# Patient Record
Sex: MALE | Race: WHITE | NOT HISPANIC OR LATINO | Employment: UNEMPLOYED | URBAN - METROPOLITAN AREA
[De-identification: names, ages, dates, MRNs, and addresses within clinical notes are randomized per-mention and may not be internally consistent; named-entity substitution may affect disease eponyms.]

---

## 2017-01-27 ENCOUNTER — OFFICE VISIT (OUTPATIENT)
Dept: FAMILY MEDICINE | Facility: CLINIC | Age: 27
End: 2017-01-27

## 2017-01-27 VITALS
SYSTOLIC BLOOD PRESSURE: 112 MMHG | HEIGHT: 71 IN | BODY MASS INDEX: 28.76 KG/M2 | WEIGHT: 205.4 LBS | HEART RATE: 76 BPM | TEMPERATURE: 98 F | DIASTOLIC BLOOD PRESSURE: 72 MMHG | RESPIRATION RATE: 20 BRPM

## 2017-01-27 DIAGNOSIS — Z00.00 ENCOUNTER FOR ROUTINE ADULT HEALTH EXAMINATION WITHOUT ABNORMAL FINDINGS: Primary | ICD-10-CM

## 2017-01-27 DIAGNOSIS — F90.9 ENCOUNTER FOR MEDICATION MANAGEMENT IN ATTENTION DEFICIT HYPERACTIVITY DISORDER (ADHD): ICD-10-CM

## 2017-01-27 DIAGNOSIS — R10.11 ABDOMINAL PAIN, RIGHT UPPER QUADRANT: ICD-10-CM

## 2017-01-27 DIAGNOSIS — Z79.899 ENCOUNTER FOR MEDICATION MANAGEMENT IN ATTENTION DEFICIT HYPERACTIVITY DISORDER (ADHD): ICD-10-CM

## 2017-01-27 DIAGNOSIS — F90.2 ADHD (ATTENTION DEFICIT HYPERACTIVITY DISORDER), COMBINED TYPE: ICD-10-CM

## 2017-01-27 DIAGNOSIS — J31.0 CHRONIC RHINITIS: ICD-10-CM

## 2017-01-27 PROCEDURE — 80076 HEPATIC FUNCTION PANEL: CPT | Mod: 90 | Performed by: PHYSICIAN ASSISTANT

## 2017-01-27 PROCEDURE — 36415 COLL VENOUS BLD VENIPUNCTURE: CPT | Performed by: PHYSICIAN ASSISTANT

## 2017-01-27 PROCEDURE — 99395 PREV VISIT EST AGE 18-39: CPT | Performed by: PHYSICIAN ASSISTANT

## 2017-01-27 RX ORDER — DEXTROAMPHETAMINE SACCHARATE, AMPHETAMINE ASPARTATE MONOHYDRATE, DEXTROAMPHETAMINE SULFATE AND AMPHETAMINE SULFATE 6.25; 6.25; 6.25; 6.25 MG/1; MG/1; MG/1; MG/1
25 CAPSULE, EXTENDED RELEASE ORAL 2 TIMES DAILY
Qty: 60 CAPSULE | Refills: 0 | Status: SHIPPED | OUTPATIENT
Start: 2017-02-01 | End: 2018-07-10

## 2017-01-27 RX ORDER — MONTELUKAST SODIUM 10 MG/1
10 TABLET ORAL AT BEDTIME
Qty: 30 TABLET | Refills: 0 | Status: SHIPPED | OUTPATIENT
Start: 2017-01-27 | End: 2018-07-10

## 2017-01-27 RX ORDER — DEXTROAMPHETAMINE SACCHARATE, AMPHETAMINE ASPARTATE MONOHYDRATE, DEXTROAMPHETAMINE SULFATE AND AMPHETAMINE SULFATE 6.25; 6.25; 6.25; 6.25 MG/1; MG/1; MG/1; MG/1
25 CAPSULE, EXTENDED RELEASE ORAL 2 TIMES DAILY
Qty: 60 CAPSULE | Refills: 0 | Status: SHIPPED | OUTPATIENT
Start: 2017-03-01 | End: 2018-07-10

## 2017-01-27 RX ORDER — DEXTROAMPHETAMINE SACCHARATE, AMPHETAMINE ASPARTATE MONOHYDRATE, DEXTROAMPHETAMINE SULFATE AND AMPHETAMINE SULFATE 6.25; 6.25; 6.25; 6.25 MG/1; MG/1; MG/1; MG/1
25 CAPSULE, EXTENDED RELEASE ORAL 2 TIMES DAILY
Qty: 60 CAPSULE | Refills: 0 | Status: SHIPPED | OUTPATIENT
Start: 2017-04-01 | End: 2017-05-01

## 2017-01-27 NOTE — NURSING NOTE
Allan Leon is here for a CPX.    Pre-visit planning  Immunizations -up to date  Colonoscopy -na  Mammogram -  Asthma test --  PHQ9 -  MARYA 7 -    Questioned patient about current smoking habits.  Pt. has never smoked.  Body mass index is 28.66 kg/(m^2).  BP Cuff left  Arm  M  Cuff  PULSE regular  My Chart: declines  CLASSIFICATION OF OVERWEIGHT AND OBESITY BY BMI                        Obesity Class           BMI(kg/m2)  Underweight                                    < 18.5  Normal                                         18.5-24.9  Overweight                                     25.0-29.9  OBESITY                     I                  30.0-34.9                             II                 35.0-39.9  EXTREME OBESITY             III                >40                            Patient's  BMI Body mass index is 28.66 kg/(m^2).  Http://hin.nhlbi.nih.gov/menuplanner/menu.cgi  ETOH screening:  Questions:  1-How often do you have a drink containing alcohol?                             1-2 times per week(s)  2-How many drinks containing alcohol do you have on a typical day when you are         Drinking?                              2 - 4   3- How often do you have 5 or more drinks on one occasion?                              never per     Have you ever:  @None of the patient's responses to the CAGE screening were positive / Negative CAGE score@

## 2017-01-27 NOTE — PROGRESS NOTES
Allan Leon is a 26 year old male presents for routine health maintenance.    Current concerns:   Allergies. Allan had allergy tests done at age 21 roughly. Confirmed allergies to many different things. Has had runny nose for 2 years, and congestion.   Zrytec 1 tablet daily as well as Flonase. Wondering if there is anything else he should try.    Body mass index is 28.66 kg/(m^2).    Present exercise habits:  3-5 times/week  Present dietary habits:  eats regular meals and follows a balanced nutrition diet    Vit D intake: is not taking supplement    Is the patient a smoker? No  If yes, smoking cessation advised and counseling provided.     Cardiovascular risk factors: none    Over the past few weeks, have you felt down or depressed? Little interest or pleasure in doing things? Yes    Last dental appointment:  last year  Last optical appointment:   more than 3 years ago    Was the patient born between [birth year] and has not had Hep C testing?  No, not applicable    I have reviewed the following histories: Past Medical History, Past Surgical History, Social History, Family History, Problem List, Medication List and Allergies    Past Medical History   Diagnosis Date     Asthma 1/1/2010     Family History   Problem Relation Age of Onset     DIABETES No family hx of      Hypertension No family hx of      Hyperlipidemia No family hx of      Social History     Social History     Marital Status: Single     Spouse Name: N/A     Number of Children: N/A     Years of Education: N/A     Occupational History     [employer]      Social History Main Topics     Smoking status: Never Smoker      Smokeless tobacco: Current User     Types: Chew     Alcohol Use: 0.0 - 2.4 oz/week     0-4 Standard drinks or equivalent per week      Comment: 0-4/day on weekdays and 4/day on wknds     Drug Use: No      Comment: Marijuana a couple times a month     Sexual Activity: Not on file     Other Topics Concern     Not on file     Social  "History Narrative     Patient Active Problem List   Diagnosis     ADHD (attention deficit hyperactivity disorder), combined type     History anabolic steroid use     Health Care Home     ACP (advance care planning)     Acne vulgaris     Other eczema     Mild intermittent asthma without complication     Current Outpatient Prescriptions   Medication     amphetamine-dextroamphetamine (ADDERALL XR) 25 MG 24 hr capsule     No current facility-administered medications for this visit.       Allergies:    Allergies   Allergen Reactions     Nickel      Tape [Adhesive Tape]          ROS:  E/M: NEGATIVE for ear, nose, mouth and throat problems  R: NEGATIVE for significant/chronic cough or SOB  CV: NEGATIVE for chest pain or palpitations  GI: NEGATIVE for abdominal pain, chronic diarrhea or constipation  : NEGATIVE for dysuria, hematuria, weakened urinary stream      OBJECTIVE:    Filed Vitals:    01/27/17 1247   BP: 112/72   Pulse: 76   Temp: 98  F (36.7  C)   TempSrc: Oral   Resp: 20   Height: 1.803 m (5' 11\")   Weight: 93.169 kg (205 lb 6.4 oz)       General: 26 year old male who appears her stated age. Vital signs noted  Head: Normocephalic  Eyes: pupils equal round reactive to light and accomodation, extra ocular movements intact  Ears: external canals and tms free of abnormalities  Nose: patent, without mucosal abnormalities  Mouth and throat: without erythema or lesions of the mucosa  Neck: supple, without adenopathy or thyromegaly  Lungs: clear to auscultation, no wheezing or crackles  Cv: regular rate and rhythm, normal s1 and s2 without murmur or click  Abd: soft, non-tender, no masses, no hepatomegaly or splenomegaly.  Gu: normal external genitalia, no hernia  Ms: normal muscle tone & symmetry  Skin: Clear to inspection  Neuro: sensation and motor function grossly intact; cranial nerves without obvious abnormalities.        ASSESSMENT/PLAN:    1. Encounter for routine adult health examination without abnormal " "findings    2. Encounter for medication management in attention deficit hyperactivity disorder (ADHD)    3. ADHD (attention deficit hyperactivity disorder), combined type  - amphetamine-dextroamphetamine (ADDERALL XR) 25 MG 24 hr capsule; Take 1 capsule (25 mg) by mouth 2 times daily  Dispense: 60 capsule; Refill: 0  - amphetamine-dextroamphetamine (ADDERALL XR) 25 MG 24 hr capsule; Take 1 capsule (25 mg) by mouth 2 times daily  Dispense: 60 capsule; Refill: 0  - amphetamine-dextroamphetamine (ADDERALL XR) 25 MG 24 hr capsule; Take 1 capsule (25 mg) by mouth 2 times daily  Dispense: 60 capsule; Refill: 0  Follow-up visit in 3 months via MyChart or OV.    4. Chronic rhinitis  1 month trial of montelukast. If he would like refills will give without OV.   - montelukast (SINGULAIR) 10 MG tablet; Take 1 tablet (10 mg) by mouth At Bedtime  Dispense: 30 tablet; Refill: 0    5. Abdominal pain, right upper quadrant  Given excess alcohol use as well as upper abdominal pain will check liver panel.   - HEPATIC PANEL ([facility])     reports that he has never smoked. His smokeless tobacco use includes Chew.      Estimated body mass index is 28.66 kg/(m^2) as calculated from the following:    Height as of this encounter: 1.803 m (5' 11\").    Weight as of this encounter: 93.169 kg (205 lb 6.4 oz).        Meds Suggested:      Vitamin D  Tests Recommended:      Regular Dental Examinations        Eye exam  Behavior Modifications:        Safe Sex Practice- Patient declined STD testing today  Immunizations suggested:  Other recommendations:     BMI noted and discussed      Regular testicle exam     Encouraged My Chart    The patient will return to the clinic if symptoms are changing or concern with follow up as discussed. The patient understands and agrees with the plan.      Madyson Rincon PA-C  1/27/2017          Counseling Resources:  ATP IV Guidelines  Pooled Cohorts Equation Calculator  Breast Cancer Risk Calculator  FRAX Risk " Assessment  ICSI Preventive Guidelines  Dietary Guidelines for Americans, 2010  USDA's MyPlate

## 2017-01-27 NOTE — Clinical Note
[facility]    01/27/2017    Patient: Allan Leon  YOB: 1990  Medical Record Number: [id number]                                                                  Controlled Substance Agreement  I understand that my care provider has prescribed controlled substances (narcotics, tranquilizers, and/or stimulants) to help manage my condition(s).  I am taking this medicine to help me function or work.  I know that this is strong medicine.  It could have serious side effects and even cause a dependency on the drug.  If I stop these medicines suddenly, I could have severe withdrawal symptoms.    The risks, benefits, and side effects of these medication(s) were explained to me.  I agree that:  1. I will take part in other treatments as advised by my provider.  This may be psychiatry or counseling, physical therapy, behavioral therapy, group treatment, or a referral to a pain clinic.  I will reduce or stop my medicine when my provider tells me to do so.   2. I will take my medicines as prescribed.  I will not change the dose or schedule unless my provider tells me to.  There will be no refills if I  run out early.   I may be contacted at any time without warning and asked to complete a drug test or pill count.   3. I will keep all my appointments at the clinic.  If I miss appointments or fail to follow instructions, my provider may stop my medicine.  4. I will not ask other providers to prescribe controlled substances. And I will not accept controlled substances from other people. If I need another prescribed controlled substance for a new reason, I will notify my provider within one business day.  5. If I enroll in the [place] Medical Marijuana program, I will tell my provider.  I will also sign an agreement to share my medical records with my provider.  6. I will use one pharmacy to fill all of my controlled substance prescriptions.  If my prescription is mailed to my pharmacy,  it may take 5 to 7 days for my medicine to be ready.  7. I understand that my provider, clinic care team, and pharmacy can track controlled substance prescriptions from other providers through a central database (prescription monitoring program).  8. I will bring in my list of medications (or my medicine bottles) each time I come to the clinic.  REV- 04/2016                                                                                                                                            Page 1 of 2      [facility]    01/27/2017    Patient: Allan Leon  YOB: 1990  Medical Record Number: [id number]    9. Refills of controlled substances will be made only during office hours.  It is up to me to make sure that I do not run out of my medicines on weekends or holidays.    10. I am responsible for my prescriptions.  If the medicine is lost or stolen, it will not be replaced.   I also agree not to share these medicines with anyone.  11. I agree to not use ANY illegal or recreational drugs.  This includes marijuana, cocaine, bath salts or other drugs.  I agree not to use alcohol unless my provider says I may.  I agree to give urine samples whenever asked.  If I fail to give a urine sample, the provider may stop my medicine.     12. I will tell my nurse or provider right away if I become pregnant or have a new medical problem treated outside of [facility].  13. I understand that this medicine can affect my thinking and judgment.  It may be unsafe for me to drive, use machinery and do dangerous tasks.  I will not do any of these things until I know how the medicine affects me.  If my dose changes, I will wait to see how it affects me.  I will contact my provider if I have concerns about medicine side effects.  I understand that if I do not follow any of the conditions above, my prescriptions or treatment may be stopped.    I agree that my provider, clinic care team,  and pharmacy may work with any city, state or federal law enforcement agency that investigates the misuse, sale, or other diversion of my controlled medicine. I will allow my provider to discuss my care with or share a copy of this agreement with any other treating provider, pharmacy or emergency room where I receive care.  I agree to give up (waive) any right of privacy or confidentiality with respect to these authorizations.   I have read this agreement and have asked questions about anything I did not understand.   ___________________________________    ___________________________  Patient Signature                                                           Date and Time  ___________________________________     ____________________________  Witness                                                                            Date and Time  ___________________________________  Madyson Rincon PA-C  REV-  04/2016                                                                                                                                                                 Page 2 of 2

## 2017-01-28 LAB
ALBUMIN SERPL-MCNC: 4.7 G/DL (ref 3.6–5.1)
ALBUMIN/GLOB SERPL: 2.1 (CALC) (ref 1–2.5)
ALP SERPL-CCNC: 43 U/L (ref 40–115)
ALT SERPL-CCNC: 20 U/L (ref 9–46)
AST SERPL-CCNC: 22 U/L (ref 10–40)
BILIRUB SERPL-MCNC: 0.8 MG/DL (ref 0.2–1.2)
BILIRUBIN, DIRECT: 0.2 MG/DL (ref 0–0.5)
BILIRUBIN,INDIRECT: 0.6 MG/DL (CALC) (ref 0.2–1.2)
GLOBULIN, CALCULATED - QUEST: 2.2 G/DL (CALC) (ref 1.9–3.7)
PROT SERPL-MCNC: 6.9 G/DL (ref 6.1–8.1)

## 2017-03-02 ENCOUNTER — TELEPHONE (OUTPATIENT)
Dept: FAMILY MEDICINE | Facility: CLINIC | Age: 27
End: 2017-03-02

## 2017-03-02 NOTE — TELEPHONE ENCOUNTER
Pt called about a PA for his medication.  We have not received anything from his pharmacy regarding a PA.   Pt is going to call his pharmacy so they can send something to us so we can start one.   Jenni.SAVI Aolnso ([facility])

## 2017-03-02 NOTE — TELEPHONE ENCOUNTER
pt called the clinical support line with the following;    Pt states that he called is pharmacy is going to send another PA form to be filled out today  Jose Eduardo  [phone] (home)     [phone]

## 2017-03-07 NOTE — TELEPHONE ENCOUNTER
Pt called about his PA and how he has been out of his medication for 1 week. PA has been resubmitted again yesterday because it timed out on Cover my meds.   Pt was told by his insurance company that we should be able to push this medication through, however, this is not somehting we are going to be able to do   Cover my Meds states that we will find out within 48 to 72 hours if the medication is covered.     Jenni.SAVI Alonso ([facility])    Pt can be reached at: [phone]

## 2017-03-08 NOTE — TELEPHONE ENCOUNTER
Pt's mom came in upset that the PA has not gone through.  Accidentally looking at an old PMI, I gave his mom information and she gave me the number for his insurance company.   Called the insurance company and talked with them and they stated that the PA should becoming back within the next 24 hours.   That there is nothing we can do about it and that we can not push it up to make it a rush at all.   Called Pt back and explained this to him and that if he wants us to be able to talk with him mom when he sends her here on his behalf then we need him to put her on a new PMI sheet.   Told Pt that I will check cover my meds in the morning and will call him to let him know.   Jenni.SAVI Alonso ([facility])      Pt can be reached at: [phone]

## 2017-03-09 NOTE — TELEPHONE ENCOUNTER
Spoke to Allan to let him know that the PA has not gone through per Jenni PETERSEN.  He does not understand what is taking so long and he has already gone through his finals and apparently failed them all so they are not happy.  I will forward to Jenni to be sure she calls the PA office tomorrow to get information as to why this has not been done yet.  It should not take this long.

## 2017-03-09 NOTE — TELEPHONE ENCOUNTER
Checked to see if the PA for his Adderall has gone through and it has not. I have checked twice today.   First time it was 8:00 a.m and now at 2:10pm.   Tried to call Pt and the cell number was not work.   Called on his home phone and left a message to have him call me back.     Jenni.SAVI Alonso ([facility])    Pt can be reached at: [phone]

## 2017-03-10 NOTE — TELEPHONE ENCOUNTER
PA for the Adderall has come back APPROVED.   Approval is from 3/6/17 to 3/6/18  Pt has been called and informed of the Approval.     SAVI Heaton ([facility])

## 2017-05-01 DIAGNOSIS — F90.2 ADHD (ATTENTION DEFICIT HYPERACTIVITY DISORDER), COMBINED TYPE: Primary | ICD-10-CM

## 2017-05-01 RX ORDER — DEXTROAMPHETAMINE SACCHARATE, AMPHETAMINE ASPARTATE MONOHYDRATE, DEXTROAMPHETAMINE SULFATE AND AMPHETAMINE SULFATE 6.25; 6.25; 6.25; 6.25 MG/1; MG/1; MG/1; MG/1
25 CAPSULE, EXTENDED RELEASE ORAL DAILY
Qty: 30 CAPSULE | Refills: 0 | Status: SHIPPED | OUTPATIENT
Start: 2017-07-02 | End: 2017-05-01

## 2017-05-01 RX ORDER — DEXTROAMPHETAMINE SACCHARATE, AMPHETAMINE ASPARTATE MONOHYDRATE, DEXTROAMPHETAMINE SULFATE AND AMPHETAMINE SULFATE 6.25; 6.25; 6.25; 6.25 MG/1; MG/1; MG/1; MG/1
50 CAPSULE, EXTENDED RELEASE ORAL DAILY
Qty: 60 CAPSULE | Refills: 0 | Status: SHIPPED | OUTPATIENT
Start: 2017-06-01 | End: 2017-05-01

## 2017-05-01 RX ORDER — DEXTROAMPHETAMINE SACCHARATE, AMPHETAMINE ASPARTATE MONOHYDRATE, DEXTROAMPHETAMINE SULFATE AND AMPHETAMINE SULFATE 6.25; 6.25; 6.25; 6.25 MG/1; MG/1; MG/1; MG/1
50 CAPSULE, EXTENDED RELEASE ORAL DAILY
Qty: 60 CAPSULE | Refills: 0 | Status: SHIPPED | OUTPATIENT
Start: 2017-05-01 | End: 2017-05-31

## 2017-05-01 RX ORDER — DEXTROAMPHETAMINE SACCHARATE, AMPHETAMINE ASPARTATE MONOHYDRATE, DEXTROAMPHETAMINE SULFATE AND AMPHETAMINE SULFATE 6.25; 6.25; 6.25; 6.25 MG/1; MG/1; MG/1; MG/1
25 CAPSULE, EXTENDED RELEASE ORAL DAILY
Qty: 30 CAPSULE | Refills: 0 | Status: SHIPPED | OUTPATIENT
Start: 2017-06-01 | End: 2017-05-01

## 2017-05-01 RX ORDER — DEXTROAMPHETAMINE SACCHARATE, AMPHETAMINE ASPARTATE MONOHYDRATE, DEXTROAMPHETAMINE SULFATE AND AMPHETAMINE SULFATE 6.25; 6.25; 6.25; 6.25 MG/1; MG/1; MG/1; MG/1
25 CAPSULE, EXTENDED RELEASE ORAL DAILY
Qty: 30 CAPSULE | Refills: 0 | Status: SHIPPED | OUTPATIENT
Start: 2017-05-01 | End: 2017-05-01

## 2017-05-01 RX ORDER — DEXTROAMPHETAMINE SACCHARATE, AMPHETAMINE ASPARTATE MONOHYDRATE, DEXTROAMPHETAMINE SULFATE AND AMPHETAMINE SULFATE 6.25; 6.25; 6.25; 6.25 MG/1; MG/1; MG/1; MG/1
50 CAPSULE, EXTENDED RELEASE ORAL DAILY
Qty: 60 CAPSULE | Refills: 0 | Status: SHIPPED | OUTPATIENT
Start: 2017-07-02 | End: 2017-08-01

## 2017-05-01 RX ORDER — DEXTROAMPHETAMINE SACCHARATE, AMPHETAMINE ASPARTATE MONOHYDRATE, DEXTROAMPHETAMINE SULFATE AND AMPHETAMINE SULFATE 6.25; 6.25; 6.25; 6.25 MG/1; MG/1; MG/1; MG/1
50 CAPSULE, EXTENDED RELEASE ORAL DAILY
Qty: 60 CAPSULE | Refills: 0 | Status: SHIPPED | OUTPATIENT
Start: 2017-06-01 | End: 2017-09-01

## 2017-05-01 NOTE — TELEPHONE ENCOUNTER
Allan Leon is requesting a refill of:    Pending Prescriptions:                       Disp   Refills    amphetamine-dextroamphetamine (ADDERALL X*30 cap*0            Sig: Take 1 capsule (25 mg) by mouth daily    amphetamine-dextroamphetamine (ADDERALL X*30 cap*0            Sig: Take 1 capsule (25 mg) by mouth daily    amphetamine-dextroamphetamine (ADDERALL X*30 cap*0            Sig: Take 1 capsule (25 mg) by mouth daily    Can call [phone] when ready. Last saw Pt on 1/27/17 for a CPX

## 2017-05-01 NOTE — TELEPHONE ENCOUNTER
Allan Leon is requesting a refill of:    Pending Prescriptions:                       Disp   Refills    amphetamine-dextroamphetamine (ADDERALL X*60 cap*0            Sig: Take 2 capsules (50 mg) by mouth daily    amphetamine-dextroamphetamine (ADDERALL X*60 cap*0            Sig: Take 2 capsules (50 mg) by mouth daily    amphetamine-dextroamphetamine (ADDERALL X*60 cap*0            Sig: Take 2 capsules (50 mg) by mouth daily

## 2017-05-04 ENCOUNTER — TELEPHONE (OUTPATIENT)
Dept: FAMILY MEDICINE | Facility: CLINIC | Age: 27
End: 2017-05-04

## 2017-05-04 NOTE — TELEPHONE ENCOUNTER
Medication was previously approved on PA 3/6/17. Pharmacy can call [phone] of there are any issues.

## 2017-07-27 ENCOUNTER — OFFICE VISIT (OUTPATIENT)
Dept: FAMILY MEDICINE | Facility: CLINIC | Age: 27
End: 2017-07-27

## 2017-07-27 VITALS
HEART RATE: 96 BPM | OXYGEN SATURATION: 99 % | WEIGHT: 195 LBS | DIASTOLIC BLOOD PRESSURE: 66 MMHG | BODY MASS INDEX: 27.2 KG/M2 | SYSTOLIC BLOOD PRESSURE: 102 MMHG | TEMPERATURE: 98.6 F

## 2017-07-27 DIAGNOSIS — F90.2 ADHD (ATTENTION DEFICIT HYPERACTIVITY DISORDER), COMBINED TYPE: ICD-10-CM

## 2017-07-27 DIAGNOSIS — R23.8 ABNORMAL SKIN COLOR: Primary | ICD-10-CM

## 2017-07-27 DIAGNOSIS — J31.0 OTHER CHRONIC RHINITIS: ICD-10-CM

## 2017-07-27 PROCEDURE — 99213 OFFICE O/P EST LOW 20 MIN: CPT | Performed by: PHYSICIAN ASSISTANT

## 2017-07-27 RX ORDER — DEXTROAMPHETAMINE SACCHARATE, AMPHETAMINE ASPARTATE MONOHYDRATE, DEXTROAMPHETAMINE SULFATE AND AMPHETAMINE SULFATE 6.25; 6.25; 6.25; 6.25 MG/1; MG/1; MG/1; MG/1
50 CAPSULE, EXTENDED RELEASE ORAL DAILY
Qty: 60 CAPSULE | Refills: 0 | Status: SHIPPED | OUTPATIENT
Start: 2017-08-27 | End: 2017-09-26

## 2017-07-27 RX ORDER — DEXTROAMPHETAMINE SACCHARATE, AMPHETAMINE ASPARTATE MONOHYDRATE, DEXTROAMPHETAMINE SULFATE AND AMPHETAMINE SULFATE 6.25; 6.25; 6.25; 6.25 MG/1; MG/1; MG/1; MG/1
50 CAPSULE, EXTENDED RELEASE ORAL DAILY
Qty: 60 CAPSULE | Refills: 0 | Status: SHIPPED | OUTPATIENT
Start: 2017-07-27 | End: 2017-08-26

## 2017-07-27 RX ORDER — DEXTROAMPHETAMINE SACCHARATE, AMPHETAMINE ASPARTATE MONOHYDRATE, DEXTROAMPHETAMINE SULFATE AND AMPHETAMINE SULFATE 6.25; 6.25; 6.25; 6.25 MG/1; MG/1; MG/1; MG/1
50 CAPSULE, EXTENDED RELEASE ORAL DAILY
Qty: 60 CAPSULE | Refills: 0 | Status: CANCELLED | OUTPATIENT
Start: 2017-07-27

## 2017-07-27 RX ORDER — DEXTROAMPHETAMINE SACCHARATE, AMPHETAMINE ASPARTATE MONOHYDRATE, DEXTROAMPHETAMINE SULFATE AND AMPHETAMINE SULFATE 6.25; 6.25; 6.25; 6.25 MG/1; MG/1; MG/1; MG/1
50 CAPSULE, EXTENDED RELEASE ORAL DAILY
Qty: 60 CAPSULE | Refills: 0 | Status: SHIPPED | OUTPATIENT
Start: 2017-09-27 | End: 2017-10-19

## 2017-07-27 NOTE — PROGRESS NOTES
ADD-ADHD Follow Up    SUBJECTIVE:  Allan Leon is a 27 year old  male who is being treated for Attention Deficit Disorder.    Are your symptoms controlled?   YES  Are you satisfied with your current medication dosage? YES  Are you taking your medication regularly?YES  Are you experiencing any insomnia? No  Are you experiencing any jitteriness? No  Are you experiencing any loss of appetite or weight loss? No  Are you experiencing any other side effects? No    Wt Readings from Last 5 Encounters:   07/27/17 88.5 kg (195 lb)   01/27/17 93.2 kg (205 lb 6.4 oz)   11/03/16 88 kg (194 lb)   08/05/16 86 kg (189 lb 9.6 oz)   05/11/16 86.6 kg (191 lb)         ROS:    E/M: NEGATIVE for ear, mouth and throat problems  R: NEGATIVE for significant cough or SOB  CV: NEGATIVE for chest pain or palpitations   GI: NEGATIVE for nausea, vomiting, abdominal pain, diarrhea or constipation    Chronic Rhinitis - using Zyrtec    Will have discoloration of nose for the last year intermittently- red  Multiple times a day  No specific activities cause exacerbation  Skin feels cold.            Past Medical History:   Diagnosis Date     Asthma 1/1/2010     Family History   Problem Relation Age of Onset     DIABETES No family hx of      Hypertension No family hx of      Hyperlipidemia No family hx of      Social History     Social History     Marital status: Single     Spouse name: N/A     Number of children: N/A     Years of education: N/A     Occupational History     [employer]      student      [employer], psychology     Social History Main Topics     Smoking status: Never Smoker     Smokeless tobacco: Current User     Types: Chew      Comment: 1 tin/day     Alcohol use 0.0 - 2.4 oz/week     0 - 4 Standard drinks or equivalent per week      Comment: 0-4/day on weekdays and 4/day on wknds     Drug use: No      Comment: Marijuana a couple times a month     Sexual activity: Not on file     Other Topics Concern     Not on file      Social History Narrative     Patient Active Problem List   Diagnosis     ADHD (attention deficit hyperactivity disorder), combined type     History anabolic steroid use     Health Care Home     ACP (advance care planning)     Acne vulgaris     Other eczema     Mild intermittent asthma without complication     Current Outpatient Prescriptions   Medication     amphetamine-dextroamphetamine (ADDERALL XR) 25 MG 24 hr capsule     amphetamine-dextroamphetamine (ADDERALL XR) 25 MG 24 hr capsule     montelukast (SINGULAIR) 10 MG tablet     No current facility-administered medications for this visit.         Allergies:  Allergies   Allergen Reactions     Nickel      Tape [Adhesive Tape]        OBJECTIVE  Vitals:    07/27/17 1500   BP: 102/66   BP Location: Left arm   Patient Position: Chair   Cuff Size: Adult Large   Pulse: 96   Temp: 98.6  F (37  C)   TempSrc: Oral   SpO2: 99%   Weight: 88.5 kg (195 lb)        PHYSICAL EXAMINATION    Patient is a 27 year old male, in no acute distress. Vitals noted   Head: Normocephalic, atraumatic.  Eyes: Conjunctiva clear.  No discharge noted.  Ears: External ears, canals and TMs normal Bilaterally: gray and translucent.   Nose: Normal without discharge.  Mouth / Throat:   Pharynx non-erythematous, no exudates present, tonsils without hypertrophy. Mucous membranes moist.  Neck:  Neck supple, No lymphadenopathy, no thyromegaly.  Cardiac:  Normal rhythm and rate, no murmurs, rubs or gallops.  Lungs: clear to auscultation bilaterally; no wheezes, crackles or rhonchi  Skin: face is clear, no telangectasias    ASSESSMENT/PLAN:      1. ADHD (attention deficit hyperactivity disorder), combined type  - amphetamine-dextroamphetamine (ADDERALL XR) 25 MG 24 hr capsule; Take 2 capsules (50 mg) by mouth daily  Dispense: 60 capsule; Refill: 0  - amphetamine-dextroamphetamine (ADDERALL XR) 25 MG 24 hr capsule; Take 2 capsules (50 mg) by mouth daily  Dispense: 60 capsule; Refill: 0  -  amphetamine-dextroamphetamine (ADDERALL XR) 25 MG 24 hr capsule; Take 2 capsules (50 mg) by mouth daily  Dispense: 60 capsule; Refill: 0    ADHD- control is good  Continue current medication dosing.E    Follow up by My Chart in 3 month(s), patient agrees to come in to office every 6 months. Patient will come in immediately if any new concerns.      2. Abnormal skin color  Send picture via [employer]    3. Other chronic rhinitis  Flonase daily            Dana Chadwick PA-C  7/27/2017

## 2017-07-27 NOTE — MR AVS SNAPSHOT
After Visit Summary   7/27/2017    Allan Leon    MRN: [id number]           Patient Information     Date Of Birth          1990        Visit Information        Provider Department      7/27/2017 2:45 PM Dana Chadwick PA [facility]        Today's Diagnoses     Abnormal skin color    -  1    ADHD (attention deficit hyperactivity disorder), combined type        Other chronic rhinitis           Follow-ups after your visit        Who to contact     If you have questions or need follow up information about today's clinic visit or your schedule please contact [facility] directly at [phone].  Normal or non-critical lab and imaging results will be communicated to you by MyChart, letter or phone within 4 business days after the clinic has received the results. If you do not hear from us within 7 days, please contact the clinic through Gridcohart or phone. If you have a critical or abnormal lab result, we will notify you by phone as soon as possible.  Submit refill requests through boo-box or call your pharmacy and they will forward the refill request to us. Please allow 3 business days for your refill to be completed.          Additional Information About Your Visit        MyChart Information     boo-box gives you secure access to your electronic health record. If you see a primary care provider, you can also send messages to your care team and make appointments. If you have questions, please call your primary care clinic.  If you do not have a primary care provider, please call [phone] and they will assist you.        Care EveryWhere ID     This is your Care EveryWhere ID. This could be used by other organizations to access your [place] medical records  [id number]        Your Vitals Were     Pulse Temperature Pulse Oximetry BMI (Body Mass Index)          96 98.6  F (37  C) (Oral) 99% 27.2 kg/m2         Blood Pressure from Last  3 Encounters:   07/27/17 102/66   01/27/17 112/72   11/03/16 112/72    Weight from Last 3 Encounters:   07/27/17 88.5 kg (195 lb)   01/27/17 93.2 kg (205 lb 6.4 oz)   11/03/16 88 kg (194 lb)              Today, you had the following     No orders found for display         Today's Medication Changes          These changes are accurate as of: 7/27/17  9:13 PM.  If you have any questions, ask your nurse or doctor.               These medicines have changed or have updated prescriptions.        Dose/Directions    * amphetamine-dextroamphetamine 25 MG 24 hr capsule   Commonly known as:  ADDERALL XR   This may have changed:  Another medication with the same name was added. Make sure you understand how and when to take each.   Used for:  ADHD (attention deficit hyperactivity disorder), combined type   Changed by:  Dana Chadwick PA        Dose:  50 mg   Take 2 capsules (50 mg) by mouth daily   Quantity:  60 capsule   Refills:  0       * amphetamine-dextroamphetamine 25 MG 24 hr capsule   Commonly known as:  ADDERALL XR   This may have changed:  Another medication with the same name was added. Make sure you understand how and when to take each.   Used for:  ADHD (attention deficit hyperactivity disorder), combined type   Changed by:  Madyson Rincon PA-C        Dose:  50 mg   Take 2 capsules (50 mg) by mouth daily   Quantity:  60 capsule   Refills:  0       * amphetamine-dextroamphetamine 25 MG 24 hr capsule   Commonly known as:  ADDERALL XR   This may have changed:  You were already taking a medication with the same name, and this prescription was added. Make sure you understand how and when to take each.   Used for:  ADHD (attention deficit hyperactivity disorder), combined type   Changed by:  Dana Chadwick PA        Dose:  50 mg   Take 2 capsules (50 mg) by mouth daily   Quantity:  60 capsule   Refills:  0       * amphetamine-dextroamphetamine 25 MG 24 hr capsule   Commonly known as:   ADDERALL XR   This may have changed:  You were already taking a medication with the same name, and this prescription was added. Make sure you understand how and when to take each.   Used for:  ADHD (attention deficit hyperactivity disorder), combined type   Changed by:  Dana Chadwick PA        Dose:  50 mg   Start taking on:  8/27/2017   Take 2 capsules (50 mg) by mouth daily   Quantity:  60 capsule   Refills:  0       * amphetamine-dextroamphetamine 25 MG 24 hr capsule   Commonly known as:  ADDERALL XR   This may have changed:  You were already taking a medication with the same name, and this prescription was added. Make sure you understand how and when to take each.   Used for:  ADHD (attention deficit hyperactivity disorder), combined type   Changed by:  Dana Chadwick PA        Dose:  50 mg   Start taking on:  9/27/2017   Take 2 capsules (50 mg) by mouth daily   Quantity:  60 capsule   Refills:  0       * Notice:  This list has 5 medication(s) that are the same as other medications prescribed for you. Read the directions carefully, and ask your doctor or other care provider to review them with you.         Where to get your medicines      Some of these will need a paper prescription and others can be bought over the counter.  Ask your nurse if you have questions.     Bring a paper prescription for each of these medications     amphetamine-dextroamphetamine 25 MG 24 hr capsule    amphetamine-dextroamphetamine 25 MG 24 hr capsule    amphetamine-dextroamphetamine 25 MG 24 hr capsule                Primary Care Provider Office Phone # Fax #    TJ Addison [phone]       [facility]  [address] [facility] [address]        Equal Access to Services     [facility]: Hadii poonam manuel hadasho Soomaali, waaxda luqadaha, qaybta kaalmada lacey, rosalinda alcantara . [facility] [phone].    ATENCIÓN: Reza finnegan,  tiene a yousif disposición servicios gratuitos de asistencia lingüística. Rigo wharton [phone].    We comply with applicable federal civil rights laws and [place] laws. We do not discriminate on the basis of race, color, national origin, age, disability sex, sexual orientation or gender identity.            Thank you!     Thank you for choosing [facility]  for your care. Our goal is always to provide you with excellent care. Hearing back from our patients is one way we can continue to improve our services. Please take a few minutes to complete the written survey that you may receive in the mail after your visit with us. Thank you!             Your Updated Medication List - Protect others around you: Learn how to safely use, store and throw away your medicines at www.disposemymeds.org.          This list is accurate as of: 7/27/17  9:13 PM.  Always use your most recent med list.                   Brand Name Dispense Instructions for use Diagnosis    * amphetamine-dextroamphetamine 25 MG 24 hr capsule    ADDERALL XR    60 capsule    Take 2 capsules (50 mg) by mouth daily    ADHD (attention deficit hyperactivity disorder), combined type       * amphetamine-dextroamphetamine 25 MG 24 hr capsule    ADDERALL XR    60 capsule    Take 2 capsules (50 mg) by mouth daily    ADHD (attention deficit hyperactivity disorder), combined type       * amphetamine-dextroamphetamine 25 MG 24 hr capsule    ADDERALL XR    60 capsule    Take 2 capsules (50 mg) by mouth daily    ADHD (attention deficit hyperactivity disorder), combined type       * amphetamine-dextroamphetamine 25 MG 24 hr capsule   Start taking on:  8/27/2017    ADDERALL XR    60 capsule    Take 2 capsules (50 mg) by mouth daily    ADHD (attention deficit hyperactivity disorder), combined type       * amphetamine-dextroamphetamine 25 MG 24 hr capsule   Start taking on:  9/27/2017    ADDERALL XR    60 capsule    Take 2 capsules (50 mg) by mouth daily     ADHD (attention deficit hyperactivity disorder), combined type       montelukast 10 MG tablet    SINGULAIR    30 tablet    Take 1 tablet (10 mg) by mouth At Bedtime    Chronic rhinitis       * Notice:  This list has 5 medication(s) that are the same as other medications prescribed for you. Read the directions carefully, and ask your doctor or other care provider to review them with you.

## 2017-07-27 NOTE — NURSING NOTE
Allan is here for med check    Pre-Visit Screening :  Immunizations : up to date  Colon Screening : SHELLI  Asthma Action Test/Plan : NA  PHQ9/GAD7 :  NA  Pulse - regular  My Chart - declines    CLASSIFICATION OF OVERWEIGHT AND OBESITY BY BMI                         Obesity Class           BMI(kg/m2)  Underweight                                    < 18.5  Normal                                         18.5-24.9  Overweight                                     25.0-29.9  OBESITY                     I                  30.0-34.9                              II                 35.0-39.9  EXTREME OBESITY             III                >40                             Patient's  BMI Body mass index is 27.2 kg/(m^2).  http://hin.nhlbi.nih.gov/menuplanner/menu.cgi  Questioned patient about current smoking habits.  Pt. has never smoked. Chews

## 2017-09-01 ENCOUNTER — OFFICE VISIT (OUTPATIENT)
Dept: FAMILY MEDICINE | Facility: CLINIC | Age: 27
End: 2017-09-01

## 2017-09-01 VITALS
DIASTOLIC BLOOD PRESSURE: 70 MMHG | TEMPERATURE: 103 F | WEIGHT: 196.4 LBS | SYSTOLIC BLOOD PRESSURE: 110 MMHG | HEART RATE: 116 BPM | BODY MASS INDEX: 26.6 KG/M2 | HEIGHT: 72 IN | RESPIRATION RATE: 20 BRPM

## 2017-09-01 DIAGNOSIS — R07.0 THROAT PAIN: Primary | ICD-10-CM

## 2017-09-01 DIAGNOSIS — R50.9 FEVER, UNSPECIFIED: ICD-10-CM

## 2017-09-01 LAB
ERYTHROCYTE [DISTWIDTH] IN BLOOD BY AUTOMATED COUNT: 12.2 %
FLUAV AG UPPER RESP QL IA.RAPID: NORMAL
FLUBV AG UPPER RESP QL IA.RAPID: NORMAL
HCT VFR BLD AUTO: 50.2 % (ref 40–53)
HEMOGLOBIN: 15.9 G/DL (ref 13.3–17.7)
MCH RBC QN AUTO: 30.9 PG (ref 26–33)
MCHC RBC AUTO-ENTMCNC: 31.7 G/DL (ref 31–36)
MCV RBC AUTO: 97.4 FL (ref 78–100)
MONONUCLEOSIS SCREEN: NORMAL
PLATELET COUNT - QUEST: 199 10^9/L (ref 150–375)
RBC # BLD AUTO: 5.15 10*12/L (ref 4.4–5.9)
S PYO AG THROAT QL IA.RAPID: NORMAL
WBC # BLD AUTO: 10.2 10*9/L (ref 4–11)

## 2017-09-01 PROCEDURE — 87880 STREP A ASSAY W/OPTIC: CPT | Performed by: PHYSICIAN ASSISTANT

## 2017-09-01 PROCEDURE — 86318 IA INFECTIOUS AGENT ANTIBODY: CPT | Performed by: PHYSICIAN ASSISTANT

## 2017-09-01 PROCEDURE — 87804 INFLUENZA ASSAY W/OPTIC: CPT | Performed by: PHYSICIAN ASSISTANT

## 2017-09-01 PROCEDURE — 85027 COMPLETE CBC AUTOMATED: CPT | Performed by: PHYSICIAN ASSISTANT

## 2017-09-01 PROCEDURE — 87070 CULTURE OTHR SPECIMN AEROBIC: CPT | Performed by: PHYSICIAN ASSISTANT

## 2017-09-01 PROCEDURE — 99213 OFFICE O/P EST LOW 20 MIN: CPT | Performed by: PHYSICIAN ASSISTANT

## 2017-09-01 PROCEDURE — 36415 COLL VENOUS BLD VENIPUNCTURE: CPT | Performed by: PHYSICIAN ASSISTANT

## 2017-09-01 NOTE — NURSING NOTE
Allan Leon presents with an illness characterized by dry cough, chills, sweats, nasal congestion, rhinorrhea and sore throat. Symptoms began 2 days ago and are unchanged since that time.  Questioned patient about current smoking habits.  Pt. has never smoked.  Body mass index is 33.67 kg/(m^2).  PULSE regular  My Chart: active  Body mass index is 26.82 kg/(m^2).  Pre-visit planning  Immunizations - up to date

## 2017-09-01 NOTE — LETTER
[facility]  [place] [facility] [address]  [place]  [address]    2017        Allan Leon  : 1990              To Whom it May Concern:    Due to illness (including high fever), please excuse Allan from all work responsibilities 2017 through 2017.     He may return to work without restriction on 2017.    Please contact our clinic with any questions or concerns. [phone].                Madyson Rincon PA-C

## 2017-09-01 NOTE — MR AVS SNAPSHOT
"              After Visit Summary   9/1/2017    Allan Leon    MRN: [id number]           Patient Information     Date Of Birth          1990        Visit Information        Provider Department      9/1/2017 1:30 PM Madyson Rincon PA-C [facility]        Today's Diagnoses     Throat pain    -  1    Fever, unspecified           Follow-ups after your visit        Follow-up notes from your care team     Return if symptoms worsen or fail to improve.      Who to contact     If you have questions or need follow up information about today's clinic visit or your schedule please contact [facility] directly at [phone].  Normal or non-critical lab and imaging results will be communicated to you by MyChart, letter or phone within 4 business days after the clinic has received the results. If you do not hear from us within 7 days, please contact the clinic through GonnaBehart or phone. If you have a critical or abnormal lab result, we will notify you by phone as soon as possible.  Submit refill requests through eFolder or call your pharmacy and they will forward the refill request to us. Please allow 3 business days for your refill to be completed.          Additional Information About Your Visit        MyChart Information     eFolder gives you secure access to your electronic health record. If you see a primary care provider, you can also send messages to your care team and make appointments. If you have questions, please call your primary care clinic.  If you do not have a primary care provider, please call [phone] and they will assist you.        Care EveryWhere ID     This is your Care EveryWhere ID. This could be used by other organizations to access your [place] medical records  [id number]        Your Vitals Were     Pulse Temperature Respirations Height BMI (Body Mass Index)       116 103  F (39.4  C) (Oral) 20 1.822 m (5' 11.75\") 26.82 kg/m2     "    Blood Pressure from Last 3 Encounters:   09/01/17 110/70   07/27/17 102/66   01/27/17 112/72    Weight from Last 3 Encounters:   09/01/17 89.1 kg (196 lb 6.4 oz)   07/27/17 88.5 kg (195 lb)   01/27/17 93.2 kg (205 lb 6.4 oz)              We Performed the Following     CL AFF MONO SCREEN (BFP)     HEMOGRAM/PLATELET (BFP)     Influenza A and B (BFP)     RAPID STREP (BFP)     THROAT CULTURE (BFP)     VENOUS COLLECTION        Primary Care Provider Office Phone # Fax #    Dana Chadwick, [phone] [address]  [facility] [address]        Equal Access to Services     ADRIANO BYRNE : Aide vizcarrao Sostuart, waaxda luqadaha, qaybta kaalmada adeegyada, rosalinda alcantara . [facility] [phone].    ATENCIÓN: Si habla español, tiene a yousif disposición servicios gratuitos de asistencia lingüística. Llame al [phone].    We comply with applicable federal civil rights laws and [place] laws. We do not discriminate on the basis of race, color, national origin, age, disability sex, sexual orientation or gender identity.            Thank you!     Thank you for choosing [facility]  for your care. Our goal is always to provide you with excellent care. Hearing back from our patients is one way we can continue to improve our services. Please take a few minutes to complete the written survey that you may receive in the mail after your visit with us. Thank you!             Your Updated Medication List - Protect others around you: Learn how to safely use, store and throw away your medicines at www.disposemymeds.org.          This list is accurate as of: 9/1/17  5:12 PM.  Always use your most recent med list.                   Brand Name Dispense Instructions for use Diagnosis    * amphetamine-dextroamphetamine 25 MG 24 hr capsule    ADDERALL XR    60 capsule    Take 2 capsules (50 mg) by mouth daily    ADHD (attention deficit hyperactivity  disorder), combined type       * amphetamine-dextroamphetamine 25 MG 24 hr capsule   Start taking on:  9/27/2017    ADDERALL XR    60 capsule    Take 2 capsules (50 mg) by mouth daily    ADHD (attention deficit hyperactivity disorder), combined type       montelukast 10 MG tablet    SINGULAIR    30 tablet    Take 1 tablet (10 mg) by mouth At Bedtime    Chronic rhinitis       * Notice:  This list has 2 medication(s) that are the same as other medications prescribed for you. Read the directions carefully, and ask your doctor or other care provider to review them with you.

## 2017-09-01 NOTE — PROGRESS NOTES
"CC: Sore throat    History:  Allan developed sudden throat pain 2 days ago. Has been getting steadily better with the throat pain. Unfortunately, body aches, fever, sweats, chills, fatigue are getting steadily worse. Has been able to stay hydrated. Works at [employer] tonight and tomorrow and wondering if he should go.      PMH, MEDICATIONS, ALLERGIES, SOCIAL AND FAMILY HISTORY in [facility] and reviewed by me personally.      ROS negative other than the symptoms noted above in the HPI.        Examination   /70 (BP Location: Right arm, Patient Position: Chair, Cuff Size: Adult Regular)  Pulse 116  Temp 103  F (39.4  C) (Oral)  Resp 20  Ht 1.822 m (5' 11.75\")  Wt 89.1 kg (196 lb 6.4 oz)  BMI 26.82 kg/m2       Constitutional: Sitting comfortably, in no acute distress. Vital signs noted. High fever.  Eyes: pupils equal round reactive to light and accomodation, extra ocular movements intact  Ears: external canals and TMs free of abnormalities  Nose: patent, without mucosal abnormalities  Mouth and throat: without erythema or lesions of the mucosa  Neck:  no adenopathy, trachea midline and normal to palpation  Cardiovascular:  regular rate and rhythm, no murmurs, clicks, or gallops  Respiratory:  normal respiratory rate and rhythm, lungs clear to auscultation  SKIN: No jaundice/pallor/rash. Feels warm to touch.  Psychiatric: mentation appears normal and affect normal/bright. No lethargy, but appears mildly uncomfortable.        A/P    ICD-10-CM    1. Throat pain R07.0 RAPID STREP (BFP)     THROAT CULTURE (BFP)     CL AFF MONO SCREEN (BFP)   2. Fever, unspecified R50.9 Influenza A and B (BFP)     HEMOGRAM/PLATELET (BFP)     VENOUS COLLECTION     CL AFF MONO SCREEN (BFP)       DISCUSSION: Allan does have a fever of 103 today, while not taking any fever reducers. Strep, influenza, and mono screens are all negative, and CBC is normal as well. Likely a significant viral pharyngitis. Recommended alternating ibuprofen and " Tylenol every 3 hours. Go to ER if fever does not improve, or with worsening symptoms despite this therapy, or if he is no longer able to stay hydrated. Wrote work note to return on Tuesday.     follow up visit: As needed    Madyson Rincon PA-C  [facility]

## 2017-09-03 LAB — THROAT CULTURE: NORMAL

## 2017-10-19 DIAGNOSIS — F90.2 ADHD (ATTENTION DEFICIT HYPERACTIVITY DISORDER), COMBINED TYPE: ICD-10-CM

## 2017-10-19 RX ORDER — DEXTROAMPHETAMINE SACCHARATE, AMPHETAMINE ASPARTATE MONOHYDRATE, DEXTROAMPHETAMINE SULFATE AND AMPHETAMINE SULFATE 6.25; 6.25; 6.25; 6.25 MG/1; MG/1; MG/1; MG/1
50 CAPSULE, EXTENDED RELEASE ORAL EVERY MORNING
Qty: 60 CAPSULE | Refills: 0 | Status: SHIPPED | OUTPATIENT
Start: 2017-12-19 | End: 2018-01-18

## 2017-10-19 RX ORDER — DEXTROAMPHETAMINE SACCHARATE, AMPHETAMINE ASPARTATE MONOHYDRATE, DEXTROAMPHETAMINE SULFATE AND AMPHETAMINE SULFATE 6.25; 6.25; 6.25; 6.25 MG/1; MG/1; MG/1; MG/1
50 CAPSULE, EXTENDED RELEASE ORAL EVERY MORNING
Qty: 60 CAPSULE | Refills: 0 | Status: SHIPPED | OUTPATIENT
Start: 2017-11-19 | End: 2018-01-18

## 2017-10-19 RX ORDER — DEXTROAMPHETAMINE SACCHARATE, AMPHETAMINE ASPARTATE MONOHYDRATE, DEXTROAMPHETAMINE SULFATE AND AMPHETAMINE SULFATE 6.25; 6.25; 6.25; 6.25 MG/1; MG/1; MG/1; MG/1
50 CAPSULE, EXTENDED RELEASE ORAL DAILY
Qty: 60 CAPSULE | Refills: 0 | Status: SHIPPED | OUTPATIENT
Start: 2017-10-19 | End: 2018-04-11

## 2017-10-19 NOTE — TELEPHONE ENCOUNTER
Allan would like a 3 mon supply refill of his Adderall and would like to pick it up today or tomorrow    Pending Prescriptions:                       Disp   Refills    amphetamine-dextroamphetamine (ADDERALL X*60 cap*0            Sig: Take 2 capsules (50 mg) by mouth daily    amphetamine-dextroamphetamine (ADDERALL X*60 cap*0            Sig: Take 2 capsules (50 mg) by mouth every morning    amphetamine-dextroamphetamine (ADDERALL X*60 cap*0            Sig: Take 2 capsules (50 mg) by mouth every morning    Allan's phone #[phone]

## 2017-11-24 ENCOUNTER — OFFICE VISIT (OUTPATIENT)
Dept: FAMILY MEDICINE | Facility: CLINIC | Age: 27
End: 2017-11-24

## 2017-11-24 VITALS
DIASTOLIC BLOOD PRESSURE: 70 MMHG | OXYGEN SATURATION: 98 % | BODY MASS INDEX: 25.9 KG/M2 | WEIGHT: 191.2 LBS | SYSTOLIC BLOOD PRESSURE: 104 MMHG | HEIGHT: 72 IN | TEMPERATURE: 98.4 F | HEART RATE: 84 BPM

## 2017-11-24 DIAGNOSIS — Z23 NEED FOR VACCINATION: ICD-10-CM

## 2017-11-24 DIAGNOSIS — S06.0X0A CONCUSSION WITHOUT LOSS OF CONSCIOUSNESS, INITIAL ENCOUNTER: Primary | ICD-10-CM

## 2017-11-24 PROCEDURE — 99213 OFFICE O/P EST LOW 20 MIN: CPT | Mod: 25 | Performed by: PHYSICIAN ASSISTANT

## 2017-11-24 PROCEDURE — 90686 IIV4 VACC NO PRSV 0.5 ML IM: CPT | Performed by: PHYSICIAN ASSISTANT

## 2017-11-24 PROCEDURE — 90471 IMMUNIZATION ADMIN: CPT | Performed by: PHYSICIAN ASSISTANT

## 2017-11-24 RX ORDER — TRAMADOL HYDROCHLORIDE 50 MG/1
50 TABLET ORAL EVERY 6 HOURS PRN
Qty: 30 TABLET | Refills: 0 | Status: SHIPPED | OUTPATIENT
Start: 2017-11-24 | End: 2018-01-18

## 2017-11-24 NOTE — LETTER
[facility]  [place] [facility] [address]  [place]  [address]    2017        Patient Name: Allan Leon  : 1990                To Whom It May Concern:    Due to illness, please excuse Allan from all work responsibilities 17-12/15/17.     He may return to work without restriction at that time.    If you have any further questions or problems, please contact our office at [phone].            Madyson Rincon PA-C
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
## 2017-11-24 NOTE — PATIENT INSTRUCTIONS
Ice/heat jaw 20 minutes twice daily.  Try to stick to as soft diet as possible gum.  Will try tramadol for headache. Be careful of drowsiness.    For concussion symptoms, would like you to see neurologist.   I will write referral, and give you their card, so you can call next week when they're open to schedule. Let me know when you get scheduled. When I am back in office, I can try to get it moved up sooner.   In the meantime, I would to start brain break (see additional papers).

## 2017-11-24 NOTE — MR AVS SNAPSHOT
After Visit Summary   11/24/2017    Allan Leon    MRN: [id number]           Patient Information     Date Of Birth          1990        Visit Information        Provider Department      11/24/2017 10:00 AM Madyson Rincon PA-C [facility]        Today's Diagnoses     Concussion without loss of consciousness, initial encounter    -  1      Care Instructions    Ice/heat jaw 20 minutes twice daily.  Try to stick to as soft diet as possible gum.  Will try tramadol for headache. Be careful of drowsiness.    For concussion symptoms, would like you to see neurologist.   I will write referral, and give you their card, so you can call next week when they're open to schedule. Let me know when you get scheduled. When I am back in office, I can try to get it moved up sooner.   In the meantime, I would to start brain break (see additional papers).            Follow-ups after your visit        Who to contact     If you have questions or need follow up information about today's clinic visit or your schedule please contact [facility] directly at [phone].  Normal or non-critical lab and imaging results will be communicated to you by Mir[employer] Therapeuticshart, letter or phone within 4 business days after the clinic has received the results. If you do not hear from us within 7 days, please contact the clinic through [email or web address] or phone. If you have a critical or abnormal lab result, we will notify you by phone as soon as possible.  Submit refill requests through Roadhop or call your pharmacy and they will forward the refill request to us. Please allow 3 business days for your refill to be completed.          Additional Information About Your Visit        [employer] Information     Roadhop gives you secure access to your electronic health record. If you see a primary care provider, you can also send messages to your care team and make appointments. If you have questions,  "please call your primary care clinic.  If you do not have a primary care provider, please call [phone] and they will assist you.        Care EveryWhere ID     This is your Care EveryWhere ID. This could be used by other organizations to access your [place] medical records  [id number]        Your Vitals Were     Pulse Temperature Height Pulse Oximetry BMI (Body Mass Index)       84 98.4  F (36.9  C) (Oral) 1.816 m (5' 11.5\") 98% 26.3 kg/m2        Blood Pressure from Last 3 Encounters:   11/24/17 104/70   09/01/17 110/70   07/27/17 102/66    Weight from Last 3 Encounters:   11/24/17 86.7 kg (191 lb 3.2 oz)   09/01/17 89.1 kg (196 lb 6.4 oz)   07/27/17 88.5 kg (195 lb)              Today, you had the following     No orders found for display         Today's Medication Changes          These changes are accurate as of: 11/24/17 11:17 AM.  If you have any questions, ask your nurse or doctor.               Start taking these medicines.        Dose/Directions    traMADol 50 MG tablet   Commonly known as:  ULTRAM   Used for:  Concussion without loss of consciousness, initial encounter   Started by:  Madyson Rincon PA-C        Dose:  50 mg   Take 1 tablet (50 mg) by mouth every 6 hours as needed for moderate pain   Quantity:  30 tablet   Refills:  0            Where to get your medicines      Some of these will need a paper prescription and others can be bought over the counter.  Ask your nurse if you have questions.     Bring a paper prescription for each of these medications     traMADol 50 MG tablet                Primary Care Provider Office Phone # Fax #    TJ Addison [phone]       [address] [facility] [address]        Equal Access to Services     ADRIANO BYRNE : Aide Bians, waaxda luqadaha, qaybta kaalmada lacey, rosalinda nina. So [facility] [phone].    ATENCIÓN: Si habla español, tiene a yousif disposición " servicios gratuitos de asistencia lingüística. Rigo wharton [phone].    We comply with applicable federal civil rights laws and [place] laws. We do not discriminate on the basis of race, color, national origin, age, disability, sex, sexual orientation, or gender identity.            Thank you!     Thank you for choosing [facility]  for your care. Our goal is always to provide you with excellent care. Hearing back from our patients is one way we can continue to improve our services. Please take a few minutes to complete the written survey that you may receive in the mail after your visit with us. Thank you!             Your Updated Medication List - Protect others around you: Learn how to safely use, store and throw away your medicines at www.disposemymeds.org.          This list is accurate as of: 11/24/17 11:17 AM.  Always use your most recent med list.                   Brand Name Dispense Instructions for use Diagnosis    * amphetamine-dextroamphetamine 25 MG 24 hr capsule    ADDERALL XR    60 capsule    Take 2 capsules (50 mg) by mouth daily    ADHD (attention deficit hyperactivity disorder), combined type       * amphetamine-dextroamphetamine 25 MG 24 hr capsule    ADDERALL XR    60 capsule    Take 2 capsules (50 mg) by mouth every morning    ADHD (attention deficit hyperactivity disorder), combined type       * amphetamine-dextroamphetamine 25 MG 24 hr capsule   Start taking on:  12/19/2017    ADDERALL XR    60 capsule    Take 2 capsules (50 mg) by mouth every morning    ADHD (attention deficit hyperactivity disorder), combined type       montelukast 10 MG tablet    SINGULAIR    30 tablet    Take 1 tablet (10 mg) by mouth At Bedtime    Chronic rhinitis       traMADol 50 MG tablet    ULTRAM    30 tablet    Take 1 tablet (50 mg) by mouth every 6 hours as needed for moderate pain    Concussion without loss of consciousness, initial encounter       * Notice:  This list has 3  medication(s) that are the same as other medications prescribed for you. Read the directions carefully, and ask your doctor or other care provider to review them with you.

## 2017-11-27 ENCOUNTER — TELEPHONE (OUTPATIENT)
Dept: FAMILY MEDICINE | Facility: CLINIC | Age: 27
End: 2017-11-27

## 2017-11-27 NOTE — TELEPHONE ENCOUNTER
Allan's dad called the CS line today. They had called [facility] and got Allan in for January 4th 2018.   Per Madyson's directions if the appointment was out too far they were to call back and let us know so that we could move the appointment up.   I have spoke with [facility] and have moved Jamshid appointment to November 29 2017 at 8:30 in the morning for an arrival time

## 2017-11-29 ENCOUNTER — TRANSFERRED RECORDS (OUTPATIENT)
Dept: FAMILY MEDICINE | Facility: CLINIC | Age: 27
End: 2017-11-29

## 2018-01-08 ENCOUNTER — TRANSFERRED RECORDS (OUTPATIENT)
Dept: FAMILY MEDICINE | Facility: CLINIC | Age: 28
End: 2018-01-08

## 2018-01-15 ENCOUNTER — TELEPHONE (OUTPATIENT)
Dept: FAMILY MEDICINE | Facility: CLINIC | Age: 28
End: 2018-01-15

## 2018-01-15 NOTE — TELEPHONE ENCOUNTER
Allan D Hendrickson called the clinic support line with the following:    Is needing a refill of his Adderall.   He is due for his 6 month check this month    I have attempted to contact this patient by phone with the following results: unable to leave message on voice mail.

## 2018-01-17 NOTE — TELEPHONE ENCOUNTER
Spoke to Allan to let him know that he needed to make an appt and he has already scheduled one for tomorrow.

## 2018-01-18 ENCOUNTER — OFFICE VISIT (OUTPATIENT)
Dept: FAMILY MEDICINE | Facility: CLINIC | Age: 28
End: 2018-01-18

## 2018-01-18 VITALS
OXYGEN SATURATION: 97 % | DIASTOLIC BLOOD PRESSURE: 70 MMHG | WEIGHT: 205.2 LBS | HEART RATE: 100 BPM | SYSTOLIC BLOOD PRESSURE: 102 MMHG | BODY MASS INDEX: 28.22 KG/M2

## 2018-01-18 DIAGNOSIS — F07.81 POST CONCUSSIVE SYNDROME: ICD-10-CM

## 2018-01-18 DIAGNOSIS — F90.2 ADHD (ATTENTION DEFICIT HYPERACTIVITY DISORDER), COMBINED TYPE: Primary | ICD-10-CM

## 2018-01-18 PROCEDURE — 99213 OFFICE O/P EST LOW 20 MIN: CPT | Performed by: PHYSICIAN ASSISTANT

## 2018-01-18 RX ORDER — DEXTROAMPHETAMINE SACCHARATE, AMPHETAMINE ASPARTATE MONOHYDRATE, DEXTROAMPHETAMINE SULFATE AND AMPHETAMINE SULFATE 6.25; 6.25; 6.25; 6.25 MG/1; MG/1; MG/1; MG/1
50 CAPSULE, EXTENDED RELEASE ORAL DAILY
Qty: 60 CAPSULE | Refills: 0 | Status: SHIPPED | OUTPATIENT
Start: 2018-03-21 | End: 2018-04-11

## 2018-01-18 RX ORDER — DEXTROAMPHETAMINE SACCHARATE, AMPHETAMINE ASPARTATE MONOHYDRATE, DEXTROAMPHETAMINE SULFATE AND AMPHETAMINE SULFATE 6.25; 6.25; 6.25; 6.25 MG/1; MG/1; MG/1; MG/1
50 CAPSULE, EXTENDED RELEASE ORAL DAILY
Qty: 60 CAPSULE | Refills: 0 | Status: SHIPPED | OUTPATIENT
Start: 2018-02-18 | End: 2018-03-20

## 2018-01-18 RX ORDER — DEXTROAMPHETAMINE SACCHARATE, AMPHETAMINE ASPARTATE MONOHYDRATE, DEXTROAMPHETAMINE SULFATE AND AMPHETAMINE SULFATE 6.25; 6.25; 6.25; 6.25 MG/1; MG/1; MG/1; MG/1
50 CAPSULE, EXTENDED RELEASE ORAL DAILY
Qty: 60 CAPSULE | Refills: 0 | Status: CANCELLED | OUTPATIENT
Start: 2018-01-18

## 2018-01-18 RX ORDER — DEXTROAMPHETAMINE SACCHARATE, AMPHETAMINE ASPARTATE MONOHYDRATE, DEXTROAMPHETAMINE SULFATE AND AMPHETAMINE SULFATE 6.25; 6.25; 6.25; 6.25 MG/1; MG/1; MG/1; MG/1
50 CAPSULE, EXTENDED RELEASE ORAL DAILY
Qty: 60 CAPSULE | Refills: 0 | Status: SHIPPED | OUTPATIENT
Start: 2018-01-18 | End: 2018-03-19

## 2018-01-18 NOTE — NURSING NOTE
Allan is here for med check    Pre-visit Screening:  Immunizations:  up to date  Colonoscopy:  Na  Mammogram: NA  Asthma Action Test/Plan:  Done today  PHQ9:  NA  GAD7:  Na  Questioned patient about current smoking habits Pt. has never smoked.  Ok to leave detailed message on voice mail for today's visit only Yes, phone # [phone] home

## 2018-01-18 NOTE — PROGRESS NOTES
CC: Medication Refill, form    History:  Allan is here needing refills of Adderall.   He takes Adderall XR 25 mg twice daily on this. Denies any side effects, and feels like he is getting good control. Would like to continue on same med.    Still dealing with concussive symptoms. Told neuro he was feeling much better, but admits this is not true. He just didn't want to make them feel bad.     PMH, MEDICATIONS, ALLERGIES, SOCIAL AND FAMILY HISTORY in [facility] and reviewed by me personally.      ROS negative other than the symptoms noted above in the HPI.        Examination   /70 (BP Location: Left arm, Patient Position: Chair, Cuff Size: Adult Large)  Pulse 100  Wt 93.1 kg (205 lb 3.2 oz)  SpO2 97%  BMI 28.22 kg/m2       Constitutional: Sitting comfortably, in no acute distress. Vital signs noted  Eyes: pupils equal round reactive to light and accomodation, extra ocular movements intact  Ears: external canals and TMs free of abnormalities  Nose: patent, without mucosal abnormalities  Mouth and throat: without erythema or lesions of the mucosa  Neck:  no adenopathy, trachea midline and normal to palpation  Cardiovascular:  regular rate and rhythm, no murmurs, clicks, or gallops  Respiratory:  normal respiratory rate and rhythm, lungs clear to auscultation  SKIN: No jaundice/pallor/rash.   Psychiatric: mentation appears normal and affect normal/bright        A/P    ICD-10-CM    1. ADHD (attention deficit hyperactivity disorder), combined type F90.2 amphetamine-dextroamphetamine (ADDERALL XR) 25 MG 24 hr capsule     amphetamine-dextroamphetamine (ADDERALL XR) 25 MG 24 hr capsule     amphetamine-dextroamphetamine (ADDERALL XR) 25 MG 24 hr capsule   2. Post concussive syndrome F07.81        DISCUSSION: No changes to ADHD medication today. Will give 3 months refills per usual.    Did spend some time discussing residual concussion symptoms. Strongly recommended he be open and honest with neurology to give himself the  best chance of being asymptomatic. Warned of possibility of chronic symptoms from concussions.     follow up visit: 6 months, with 3 month  refills.     Madyson Rincon PA-C  [facility]

## 2018-01-18 NOTE — MR AVS SNAPSHOT
After Visit Summary   1/18/2018    Allan Leon    MRN: [id number]           Patient Information     Date Of Birth          1990        Visit Information        Provider Department      1/18/2018 9:45 AM Madyson Rincon PA-C [facility]        Today's Diagnoses     ADHD (attention deficit hyperactivity disorder), combined type    -  1    Post concussive syndrome           Follow-ups after your visit        Follow-up notes from your care team     Return in about 6 months (around 7/18/2018) for Routine Visit.      Who to contact     If you have questions or need follow up information about today's clinic visit or your schedule please contact [facility] directly at [phone].  Normal or non-critical lab and imaging results will be communicated to you by [email or web address], letter or phone within 4 business days after the clinic has received the results. If you do not hear from us within 7 days, please contact the clinic through [email or web address] or phone. If you have a critical or abnormal lab result, we will notify you by phone as soon as possible.  Submit refill requests through [employer]s [employer] or call your pharmacy and they will forward the refill request to us. Please allow 3 business days for your refill to be completed.          Additional Information About Your Visit        [email or web address] Solutions Consulting Information     As[employer] Software gives you secure access to your electronic health record. If you see a primary care provider, you can also send messages to your care team and make appointments. If you have questions, please call your primary care clinic.  If you do not have a primary care provider, please call [phone] and they will assist you.        Care EveryWhere ID     This is your Care EveryWhere ID. This could be used by other organizations to access your [place] [employer] medical records  [id number]        Your Vitals Were     Pulse Pulse Oximetry BMI (Body Mass Index)             100  97% 28.22 kg/m2          Blood Pressure from Last 3 Encounters:   01/18/18 102/70   11/24/17 104/70   09/01/17 110/70    Weight from Last 3 Encounters:   01/18/18 93.1 kg (205 lb 3.2 oz)   11/24/17 86.7 kg (191 lb 3.2 oz)   09/01/17 89.1 kg (196 lb 6.4 oz)              Today, you had the following     No orders found for display         Today's Medication Changes          These changes are accurate as of: 1/18/18 11:59 PM.  If you have any questions, ask your nurse or doctor.               These medicines have changed or have updated prescriptions.        Dose/Directions    * amphetamine-dextroamphetamine 25 MG 24 hr capsule   Commonly known as:  ADDERALL XR   This may have changed:  Another medication with the same name was added. Make sure you understand how and when to take each.   Used for:  ADHD (attention deficit hyperactivity disorder), combined type   Changed by:  Dana Chadwick PA        Dose:  50 mg   Take 2 capsules (50 mg) by mouth daily   Quantity:  60 capsule   Refills:  0       * amphetamine-dextroamphetamine 25 MG 24 hr capsule   Commonly known as:  ADDERALL XR   This may have changed:  You were already taking a medication with the same name, and this prescription was added. Make sure you understand how and when to take each.   Used for:  ADHD (attention deficit hyperactivity disorder), combined type   Changed by:  Madyson Rincon PA-C        Dose:  50 mg   Take 2 capsules (50 mg) by mouth daily   Quantity:  60 capsule   Refills:  0       * amphetamine-dextroamphetamine 25 MG 24 hr capsule   Commonly known as:  ADDERALL XR   This may have changed:  You were already taking a medication with the same name, and this prescription was added. Make sure you understand how and when to take each.   Used for:  ADHD (attention deficit hyperactivity disorder), combined type   Changed by:  Madyson Rincon PA-C        Dose:  50 mg   Start taking on:  2/18/2018   Take 2 capsules (50 mg)  by mouth daily   Quantity:  60 capsule   Refills:  0       * amphetamine-dextroamphetamine 25 MG 24 hr capsule   Commonly known as:  ADDERALL XR   This may have changed:  You were already taking a medication with the same name, and this prescription was added. Make sure you understand how and when to take each.   Used for:  ADHD (attention deficit hyperactivity disorder), combined type   Changed by:  Madyson Rincon PA-C        Dose:  50 mg   Start taking on:  3/21/2018   Take 2 capsules (50 mg) by mouth daily   Quantity:  60 capsule   Refills:  0       * Notice:  This list has 4 medication(s) that are the same as other medications prescribed for you. Read the directions carefully, and ask your doctor or other care provider to review them with you.         Where to get your medicines      Some of these will need a paper prescription and others can be bought over the counter.  Ask your nurse if you have questions.     Bring a paper prescription for each of these medications     amphetamine-dextroamphetamine 25 MG 24 hr capsule    amphetamine-dextroamphetamine 25 MG 24 hr capsule    amphetamine-dextroamphetamine 25 MG 24 hr capsule                Primary Care Provider Office Phone # Fax #    TJ Addison [phone]       [address] [facility] [address]        Equal Access to Services     ADRIANO BYRNE : Hadii poonam vizcarrao Sostuart, waaxda luqadaha, qaybta kaalmada adeegyada, rosalinda nina. So [facility] [phone].    ATENCIÓN: Si habla español, tiene a yousif disposición servicios gratuitos de asistencia lingüística. Llame al [phone].    We comply with applicable federal civil rights laws and [place] laws. We do not discriminate on the basis of race, color, national origin, age, disability, sex, sexual orientation, or gender identity.            Thank you!     Thank you for choosing [facility]  for your care. Our goal  is always to provide you with excellent care. Hearing back from our patients is one way we can continue to improve our services. Please take a few minutes to complete the written survey that you may receive in the mail after your visit with us. Thank you!             Your Updated Medication List - Protect others around you: Learn how to safely use, store and throw away your medicines at www.disposemymeds.org.          This list is accurate as of: 1/18/18 11:59 PM.  Always use your most recent med list.                   Brand Name Dispense Instructions for use Diagnosis    * amphetamine-dextroamphetamine 25 MG 24 hr capsule    ADDERALL XR    60 capsule    Take 2 capsules (50 mg) by mouth daily    ADHD (attention deficit hyperactivity disorder), combined type       * amphetamine-dextroamphetamine 25 MG 24 hr capsule    ADDERALL XR    60 capsule    Take 2 capsules (50 mg) by mouth daily    ADHD (attention deficit hyperactivity disorder), combined type       * amphetamine-dextroamphetamine 25 MG 24 hr capsule   Start taking on:  2/18/2018    ADDERALL XR    60 capsule    Take 2 capsules (50 mg) by mouth daily    ADHD (attention deficit hyperactivity disorder), combined type       * amphetamine-dextroamphetamine 25 MG 24 hr capsule   Start taking on:  3/21/2018    ADDERALL XR    60 capsule    Take 2 capsules (50 mg) by mouth daily    ADHD (attention deficit hyperactivity disorder), combined type       montelukast 10 MG tablet    SINGULAIR    30 tablet    Take 1 tablet (10 mg) by mouth At Bedtime    Chronic rhinitis       WELLBUTRIN PO      Take 50 mg by mouth        * Notice:  This list has 4 medication(s) that are the same as other medications prescribed for you. Read the directions carefully, and ask your doctor or other care provider to review them with you.

## 2018-01-19 ASSESSMENT — ASTHMA QUESTIONNAIRES: ACT_TOTALSCORE: 24

## 2018-02-16 ENCOUNTER — TRANSFERRED RECORDS (OUTPATIENT)
Dept: FAMILY MEDICINE | Facility: CLINIC | Age: 28
End: 2018-02-16

## 2018-03-09 ENCOUNTER — TELEPHONE (OUTPATIENT)
Dept: FAMILY MEDICINE | Facility: CLINIC | Age: 28
End: 2018-03-09

## 2018-03-09 NOTE — TELEPHONE ENCOUNTER
pt called the clinical support line with the following;    Pt called and is asking for a refill on adderall.  Pt getting 3 rx's at a time?  Please refill and send to nurse to call pt or send my chart message  Jose Eduardo  [phone] (home)

## 2018-03-12 NOTE — TELEPHONE ENCOUNTER
Will forward to Naomy. Naomy is it possible to start a PA for his March script before he even turns it in?

## 2018-03-13 NOTE — TELEPHONE ENCOUNTER
Attempted to contact pt to inform of this, but went straight to voicemail, and voicemail box was not set up. Will try again tomorrow.

## 2018-03-19 ENCOUNTER — TRANSFERRED RECORDS (OUTPATIENT)
Dept: FAMILY MEDICINE | Facility: CLINIC | Age: 28
End: 2018-03-19

## 2018-04-10 DIAGNOSIS — F90.2 ADHD (ATTENTION DEFICIT HYPERACTIVITY DISORDER), COMBINED TYPE: ICD-10-CM

## 2018-04-10 RX ORDER — DEXTROAMPHETAMINE SACCHARATE, AMPHETAMINE ASPARTATE MONOHYDRATE, DEXTROAMPHETAMINE SULFATE AND AMPHETAMINE SULFATE 6.25; 6.25; 6.25; 6.25 MG/1; MG/1; MG/1; MG/1
50 CAPSULE, EXTENDED RELEASE ORAL DAILY
Qty: 60 CAPSULE | Refills: 0 | Status: CANCELLED | OUTPATIENT
Start: 2018-04-10

## 2018-04-10 NOTE — TELEPHONE ENCOUNTER
Patient left message asking for a 3 month supply of his adderall refills. He was last seen for ADHD on 1/18/18 and medication was last filled on 3/21/18. Routing to [facility] to put medication refills through.

## 2018-04-11 RX ORDER — DEXTROAMPHETAMINE SACCHARATE, AMPHETAMINE ASPARTATE MONOHYDRATE, DEXTROAMPHETAMINE SULFATE AND AMPHETAMINE SULFATE 6.25; 6.25; 6.25; 6.25 MG/1; MG/1; MG/1; MG/1
25 CAPSULE, EXTENDED RELEASE ORAL 2 TIMES DAILY
Qty: 60 CAPSULE | Refills: 0 | Status: SHIPPED | OUTPATIENT
Start: 2018-04-11 | End: 2018-04-11

## 2018-04-11 RX ORDER — DEXTROAMPHETAMINE SACCHARATE, AMPHETAMINE ASPARTATE MONOHYDRATE, DEXTROAMPHETAMINE SULFATE AND AMPHETAMINE SULFATE 6.25; 6.25; 6.25; 6.25 MG/1; MG/1; MG/1; MG/1
25 CAPSULE, EXTENDED RELEASE ORAL 2 TIMES DAILY
Qty: 60 CAPSULE | Refills: 0 | Status: SHIPPED | OUTPATIENT
Start: 2018-05-11 | End: 2018-06-11

## 2018-04-11 RX ORDER — DEXTROAMPHETAMINE SACCHARATE, AMPHETAMINE ASPARTATE MONOHYDRATE, DEXTROAMPHETAMINE SULFATE AND AMPHETAMINE SULFATE 6.25; 6.25; 6.25; 6.25 MG/1; MG/1; MG/1; MG/1
25 CAPSULE, EXTENDED RELEASE ORAL 2 TIMES DAILY
Qty: 60 CAPSULE | Refills: 0 | Status: SHIPPED | OUTPATIENT
Start: 2018-04-11 | End: 2018-05-11

## 2018-04-11 RX ORDER — DEXTROAMPHETAMINE SACCHARATE, AMPHETAMINE ASPARTATE MONOHYDRATE, DEXTROAMPHETAMINE SULFATE AND AMPHETAMINE SULFATE 6.25; 6.25; 6.25; 6.25 MG/1; MG/1; MG/1; MG/1
25 CAPSULE, EXTENDED RELEASE ORAL 2 TIMES DAILY
Qty: 60 CAPSULE | Refills: 0 | Status: SHIPPED | OUTPATIENT
Start: 2018-06-11 | End: 2018-07-10

## 2018-04-11 NOTE — TELEPHONE ENCOUNTER
Allan Leon is requesting a refill of:    Pending Prescriptions:                       Disp   Refills    amphetamine-dextroamphetamine (ADDERALL X*60 cap*0            Sig: Take 1 capsule (25 mg) by mouth 2 times daily    amphetamine-dextroamphetamine (ADDERALL X*60 cap*0            Sig: Take 1 capsule (25 mg) by mouth 2 times daily    amphetamine-dextroamphetamine (ADDERALL X*60 cap*0            Sig: Take 1 capsule (25 mg) by mouth 2 times daily    Can be reached at [phone]

## 2018-05-01 ENCOUNTER — TRANSFERRED RECORDS (OUTPATIENT)
Dept: FAMILY MEDICINE | Facility: CLINIC | Age: 28
End: 2018-05-01

## 2018-05-21 DIAGNOSIS — L30.9 DERMATITIS: ICD-10-CM

## 2018-05-21 DIAGNOSIS — L30.8 OTHER ECZEMA: Primary | ICD-10-CM

## 2018-05-21 RX ORDER — HYDROCORTISONE 25 MG/G
OINTMENT TOPICAL 2 TIMES DAILY PRN
Qty: 45 G | Refills: 0 | Status: SHIPPED | OUTPATIENT
Start: 2018-05-21 | End: 2019-07-23

## 2018-05-21 RX ORDER — HYDROCORTISONE 25 MG/G
OINTMENT TOPICAL PRN
Qty: 45 G | Refills: 1 | Status: CANCELLED | OUTPATIENT
Start: 2018-05-21

## 2018-05-21 NOTE — TELEPHONE ENCOUNTER
Refill Request Received:    Pending Prescriptions:                       Disp   Refills    hydrocortisone 2.5 % ointment             45 g   1            Sig: Apply topically as needed    Last Refill: 5/11/2016  Last OV: 1/18/2018 (ADHD Recheck)  Recheck: 6 months  Scheduled Office Visit: None Scheduled    Patient called an LM stating that he needs this medication refilled to help with the allergic reaction he is having, it has been given to him for that in the past and he knows that's what he needs. He states he can't afford to come in and is there anyway we can refill it.    Please Review and Advise    Thank You,  Keerthi Higginbotham, CMA

## 2018-05-22 NOTE — TELEPHONE ENCOUNTER
I called and spoke with the patient and he states that he has no breathing or swallowing issues with the allergic reaction he is having.    Keerthi Higginbotham, CMA

## 2018-07-09 ENCOUNTER — TELEPHONE (OUTPATIENT)
Dept: FAMILY MEDICINE | Facility: CLINIC | Age: 28
End: 2018-07-09

## 2018-07-09 NOTE — TELEPHONE ENCOUNTER
Patient called in requesting a 3 month supply of his adderall. Based off patients encounter history, last time he was seen was on 1/18/18 for ADHD and was due back in 6 months. We are now at the 6 month point so I called him to inform him of this. He scheduled an appointment for tomorrow 7/10/18 at 4:45 pm. He had no further questions or concerns.

## 2018-07-10 ENCOUNTER — OFFICE VISIT (OUTPATIENT)
Dept: FAMILY MEDICINE | Facility: CLINIC | Age: 28
End: 2018-07-10

## 2018-07-10 VITALS
HEART RATE: 73 BPM | SYSTOLIC BLOOD PRESSURE: 124 MMHG | TEMPERATURE: 98.2 F | BODY MASS INDEX: 31.98 KG/M2 | HEIGHT: 71 IN | OXYGEN SATURATION: 96 % | WEIGHT: 228.4 LBS | DIASTOLIC BLOOD PRESSURE: 80 MMHG

## 2018-07-10 DIAGNOSIS — F90.2 ADHD (ATTENTION DEFICIT HYPERACTIVITY DISORDER), COMBINED TYPE: Primary | ICD-10-CM

## 2018-07-10 DIAGNOSIS — L20.9 ATOPIC DERMATITIS, UNSPECIFIED TYPE: ICD-10-CM

## 2018-07-10 DIAGNOSIS — N52.9 ERECTILE DYSFUNCTION, UNSPECIFIED ERECTILE DYSFUNCTION TYPE: ICD-10-CM

## 2018-07-10 DIAGNOSIS — R63.5 WEIGHT GAIN: ICD-10-CM

## 2018-07-10 PROBLEM — F10.20 ALCOHOL DEPENDENCE (H): Status: ACTIVE | Noted: 2018-07-10

## 2018-07-10 PROCEDURE — 99214 OFFICE O/P EST MOD 30 MIN: CPT | Performed by: PHYSICIAN ASSISTANT

## 2018-07-10 RX ORDER — HYDROCORTISONE VALERATE CREAM 2 MG/G
CREAM TOPICAL
Qty: 45 G | Refills: 0 | Status: SHIPPED | OUTPATIENT
Start: 2018-07-10 | End: 2018-07-12

## 2018-07-10 RX ORDER — DEXTROAMPHETAMINE SACCHARATE, AMPHETAMINE ASPARTATE MONOHYDRATE, DEXTROAMPHETAMINE SULFATE AND AMPHETAMINE SULFATE 6.25; 6.25; 6.25; 6.25 MG/1; MG/1; MG/1; MG/1
25 CAPSULE, EXTENDED RELEASE ORAL 2 TIMES DAILY
Qty: 60 CAPSULE | Refills: 0 | Status: SHIPPED | OUTPATIENT
Start: 2018-07-10 | End: 2018-07-10

## 2018-07-10 RX ORDER — DEXTROAMPHETAMINE SACCHARATE, AMPHETAMINE ASPARTATE MONOHYDRATE, DEXTROAMPHETAMINE SULFATE AND AMPHETAMINE SULFATE 6.25; 6.25; 6.25; 6.25 MG/1; MG/1; MG/1; MG/1
25 CAPSULE, EXTENDED RELEASE ORAL 2 TIMES DAILY
Qty: 60 CAPSULE | Refills: 0 | Status: CANCELLED | OUTPATIENT
Start: 2018-07-10 | End: 2018-08-09

## 2018-07-10 RX ORDER — DEXTROAMPHETAMINE SACCHARATE, AMPHETAMINE ASPARTATE MONOHYDRATE, DEXTROAMPHETAMINE SULFATE AND AMPHETAMINE SULFATE 7.5; 7.5; 7.5; 7.5 MG/1; MG/1; MG/1; MG/1
30 CAPSULE, EXTENDED RELEASE ORAL DAILY
Qty: 30 CAPSULE | Refills: 0 | Status: CANCELLED | OUTPATIENT
Start: 2018-07-10

## 2018-07-10 RX ORDER — DEXTROAMPHETAMINE SACCHARATE, AMPHETAMINE ASPARTATE MONOHYDRATE, DEXTROAMPHETAMINE SULFATE AND AMPHETAMINE SULFATE 6.25; 6.25; 6.25; 6.25 MG/1; MG/1; MG/1; MG/1
25 CAPSULE, EXTENDED RELEASE ORAL 2 TIMES DAILY
Qty: 60 CAPSULE | Refills: 0 | Status: SHIPPED | OUTPATIENT
Start: 2018-09-10 | End: 2018-07-10

## 2018-07-10 RX ORDER — DEXTROAMPHETAMINE SACCHARATE, AMPHETAMINE ASPARTATE MONOHYDRATE, DEXTROAMPHETAMINE SULFATE AND AMPHETAMINE SULFATE 6.25; 6.25; 6.25; 6.25 MG/1; MG/1; MG/1; MG/1
25 CAPSULE, EXTENDED RELEASE ORAL 2 TIMES DAILY
Qty: 60 CAPSULE | Refills: 0 | Status: SHIPPED | OUTPATIENT
Start: 2018-08-10 | End: 2018-10-08

## 2018-07-10 RX ORDER — DEXTROAMPHETAMINE SACCHARATE, AMPHETAMINE ASPARTATE MONOHYDRATE, DEXTROAMPHETAMINE SULFATE AND AMPHETAMINE SULFATE 6.25; 6.25; 6.25; 6.25 MG/1; MG/1; MG/1; MG/1
25 CAPSULE, EXTENDED RELEASE ORAL 2 TIMES DAILY
Qty: 60 CAPSULE | Refills: 0 | Status: SHIPPED | OUTPATIENT
Start: 2018-07-10 | End: 2018-10-08

## 2018-07-10 RX ORDER — DEXTROAMPHETAMINE SACCHARATE, AMPHETAMINE ASPARTATE MONOHYDRATE, DEXTROAMPHETAMINE SULFATE AND AMPHETAMINE SULFATE 6.25; 6.25; 6.25; 6.25 MG/1; MG/1; MG/1; MG/1
25 CAPSULE, EXTENDED RELEASE ORAL 2 TIMES DAILY
Qty: 60 CAPSULE | Refills: 0 | Status: CANCELLED | OUTPATIENT
Start: 2018-09-10 | End: 2018-10-10

## 2018-07-10 RX ORDER — DEXTROAMPHETAMINE SACCHARATE, AMPHETAMINE ASPARTATE MONOHYDRATE, DEXTROAMPHETAMINE SULFATE AND AMPHETAMINE SULFATE 6.25; 6.25; 6.25; 6.25 MG/1; MG/1; MG/1; MG/1
25 CAPSULE, EXTENDED RELEASE ORAL 2 TIMES DAILY
Qty: 60 CAPSULE | Refills: 0 | Status: CANCELLED | OUTPATIENT
Start: 2018-08-10 | End: 2018-09-09

## 2018-07-10 RX ORDER — SILDENAFIL CITRATE 20 MG/1
TABLET ORAL
Qty: 30 TABLET | Refills: 0 | Status: SHIPPED | OUTPATIENT
Start: 2018-07-10 | End: 2019-05-02

## 2018-07-10 RX ORDER — DEXTROAMPHETAMINE SACCHARATE, AMPHETAMINE ASPARTATE MONOHYDRATE, DEXTROAMPHETAMINE SULFATE AND AMPHETAMINE SULFATE 6.25; 6.25; 6.25; 6.25 MG/1; MG/1; MG/1; MG/1
25 CAPSULE, EXTENDED RELEASE ORAL 2 TIMES DAILY
Qty: 60 CAPSULE | Refills: 0 | Status: SHIPPED | OUTPATIENT
Start: 2018-08-10 | End: 2018-07-10

## 2018-07-10 RX ORDER — DEXTROAMPHETAMINE SACCHARATE, AMPHETAMINE ASPARTATE MONOHYDRATE, DEXTROAMPHETAMINE SULFATE AND AMPHETAMINE SULFATE 6.25; 6.25; 6.25; 6.25 MG/1; MG/1; MG/1; MG/1
25 CAPSULE, EXTENDED RELEASE ORAL 2 TIMES DAILY
Qty: 60 CAPSULE | Refills: 0 | Status: SHIPPED | OUTPATIENT
Start: 2018-09-10 | End: 2018-10-08

## 2018-07-10 NOTE — NURSING NOTE
Allan Leon is here today for a recheck of his ADHD medication.    Pre-visit Screening:    Immunizations:  up to date  Colonoscopy:  NA  Asthma Action Test/Plan:  is completed today    PHQ-2 Score:     PHQ-2 ( 1999 Pfizer) 7/10/2018   Q1: Little interest or pleasure in doing things 0   Q2: Feeling down, depressed or hopeless 0   PHQ-2 Score 0     GAD7:  NA    Questioned patient about current smoking habits Pt. has never smoked.    Is it ok to leave a detailed message on home phone's voice mail for today's visit only? Yes     Phone # [phone] (home)      [facility]

## 2018-07-10 NOTE — MR AVS SNAPSHOT
After Visit Summary   7/10/2018    Allan Leon    MRN: [id number]           Patient Information     Date Of Birth          1990        Visit Information        Provider Department      7/10/2018 4:45 PM Madyson Rincon PA-C [facility]        Today's Diagnoses     ADHD (attention deficit hyperactivity disorder), combined type    -  1    Erectile dysfunction, unspecified erectile dysfunction type        Atopic dermatitis, unspecified type        Weight gain           Follow-ups after your visit        Follow-up notes from your care team     Return in about 6 months (around 1/10/2019).      Who to contact     If you have questions or need follow up information about today's clinic visit or your schedule please contact [facility] directly at [phone].  Normal or non-critical lab and imaging results will be communicated to you by MyChart, letter or phone within 4 business days after the clinic has received the results. If you do not hear from us within 7 days, please contact the clinic through MyChart or phone. If you have a critical or abnormal lab result, we will notify you by phone as soon as possible.  Submit refill requests through [employer] or call your pharmacy and they will forward the refill request to us. Please allow 3 business days for your refill to be completed.          Additional Information About Your Visit        MyChart Information     [employer] gives you secure access to your electronic health record. If you see a primary care provider, you can also send messages to your care team and make appointments. If you have questions, please call your primary care clinic.  If you do not have a primary care provider, please call [phone] and they will assist you.        Care EveryWhere ID     This is your Care EveryWhere ID. This could be used by other organizations to access your [facility] medical records  [id number]       "  Your Vitals Were     Pulse Temperature Height Pulse Oximetry BMI (Body Mass Index)       73 98.2  F (36.8  C) (Oral) 1.797 m (5' 10.75\") 96% 32.08 kg/m2        Blood Pressure from Last 3 Encounters:   07/10/18 124/80   01/18/18 102/70   11/24/17 104/70    Weight from Last 3 Encounters:   07/10/18 103.6 kg (228 lb 6.4 oz)   01/18/18 93.1 kg (205 lb 3.2 oz)   11/24/17 86.7 kg (191 lb 3.2 oz)              Today, you had the following     No orders found for display         Today's Medication Changes          These changes are accurate as of 7/10/18 11:19 PM.  If you have any questions, ask your nurse or doctor.               Start taking these medicines.        Dose/Directions    hydrocortisone 0.2 % cream   Commonly known as:  WESTCORT   Used for:  Atopic dermatitis, unspecified type   Started by:  Madyson Rincon PA-C        Apply sparingly to affected area two times daily as needed.   Quantity:  45 g   Refills:  0       sildenafil 20 MG tablet   Commonly known as:  REVATIO   Used for:  Erectile dysfunction, unspecified erectile dysfunction type   Started by:  Madyson Rincon PA-C        Take 1-2 tablets (20-40 mg) by mouth as needed 30-60 minutes prior to intercourse. Never use with nitroglycerin, terazosin or doxazosin.   Quantity:  30 tablet   Refills:  0         These medicines have changed or have updated prescriptions.        Dose/Directions    * amphetamine-dextroamphetamine 25 MG 24 hr capsule   Commonly known as:  ADDERALL XR   This may have changed:  Another medication with the same name was added. Make sure you understand how and when to take each.   Used for:  ADHD (attention deficit hyperactivity disorder), combined type   Changed by:  Madyson Rincon PA-C        Dose:  25 mg   Take 1 capsule (25 mg) by mouth 2 times daily   Quantity:  60 capsule   Refills:  0       * amphetamine-dextroamphetamine 25 MG 24 hr capsule   Commonly known as:  ADDERALL XR   This may have changed:  You " were already taking a medication with the same name, and this prescription was added. Make sure you understand how and when to take each.   Used for:  ADHD (attention deficit hyperactivity disorder), combined type   Changed by:  Madyson Rincon PA-C        Dose:  25 mg   Start taking on:  8/10/2018   Take 1 capsule (25 mg) by mouth 2 times daily   Quantity:  60 capsule   Refills:  0       * amphetamine-dextroamphetamine 25 MG 24 hr capsule   Commonly known as:  ADDERALL XR   This may have changed:  You were already taking a medication with the same name, and this prescription was added. Make sure you understand how and when to take each.   Used for:  ADHD (attention deficit hyperactivity disorder), combined type   Changed by:  Madyson Rincon PA-C        Dose:  25 mg   Start taking on:  9/10/2018   Take 1 capsule (25 mg) by mouth 2 times daily   Quantity:  60 capsule   Refills:  0       * Notice:  This list has 3 medication(s) that are the same as other medications prescribed for you. Read the directions carefully, and ask your doctor or other care provider to review them with you.         Where to get your medicines      Some of these will need a paper prescription and others can be bought over the counter.  Ask your nurse if you have questions.     Bring a paper prescription for each of these medications     amphetamine-dextroamphetamine 25 MG 24 hr capsule    amphetamine-dextroamphetamine 25 MG 24 hr capsule    amphetamine-dextroamphetamine 25 MG 24 hr capsule    hydrocortisone 0.2 % cream    sildenafil 20 MG tablet                Primary Care Provider Office Phone # Fax #    TJ Addison [phone]       [facility] [address] [facility] [address]        Equal Access to Services     [facility]: Hadii poonam martínez Sostuart, waaxda luqadaha, qaybta kaalrosalinda lemon . [facility] [phone].    ATENCIÓN: Reza finnegan,  tiene a yousif disposición servicios gratuitos de asistencia lingüística. Rigo wharton [phone].    We comply with applicable federal civil rights laws and [place] laws. We do not discriminate on the basis of race, color, national origin, age, disability, sex, sexual orientation, or gender identity.            Thank you!     Thank you for choosing [facility]  for your care. Our goal is always to provide you with excellent care. Hearing back from our patients is one way we can continue to improve our services. Please take a few minutes to complete the written survey that you may receive in the mail after your visit with us. Thank you!             Your Updated Medication List - Protect others around you: Learn how to safely use, store and throw away your medicines at www.disposemymeds.org.          This list is accurate as of 7/10/18 11:19 PM.  Always use your most recent med list.                   Brand Name Dispense Instructions for use Diagnosis    * amphetamine-dextroamphetamine 25 MG 24 hr capsule    ADDERALL XR    60 capsule    Take 1 capsule (25 mg) by mouth 2 times daily    ADHD (attention deficit hyperactivity disorder), combined type       * amphetamine-dextroamphetamine 25 MG 24 hr capsule   Start taking on:  8/10/2018    ADDERALL XR    60 capsule    Take 1 capsule (25 mg) by mouth 2 times daily    ADHD (attention deficit hyperactivity disorder), combined type       * amphetamine-dextroamphetamine 25 MG 24 hr capsule   Start taking on:  9/10/2018    ADDERALL XR    60 capsule    Take 1 capsule (25 mg) by mouth 2 times daily    ADHD (attention deficit hyperactivity disorder), combined type       hydrocortisone 0.2 % cream    WESTCORT    45 g    Apply sparingly to affected area two times daily as needed.    Atopic dermatitis, unspecified type       hydrocortisone 2.5 % ointment     45 g    Apply topically 2 times daily as needed for rash (NO more than 14 days in a row.)    Dermatitis        sildenafil 20 MG tablet    REVATIO    30 tablet    Take 1-2 tablets (20-40 mg) by mouth as needed 30-60 minutes prior to intercourse. Never use with nitroglycerin, terazosin or doxazosin.    Erectile dysfunction, unspecified erectile dysfunction type       * Notice:  This list has 3 medication(s) that are the same as other medications prescribed for you. Read the directions carefully, and ask your doctor or other care provider to review them with you.
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
## 2018-07-11 ASSESSMENT — ASTHMA QUESTIONNAIRES: ACT_TOTALSCORE: 25

## 2018-07-12 ENCOUNTER — TELEPHONE (OUTPATIENT)
Dept: FAMILY MEDICINE | Facility: CLINIC | Age: 28
End: 2018-07-12

## 2018-07-12 RX ORDER — BETAMETHASONE VALERATE 1.2 MG/G
CREAM TOPICAL 2 TIMES DAILY
Qty: 45 G | Refills: 0 | COMMUNITY
Start: 2018-07-12 | End: 2019-05-02

## 2018-07-12 NOTE — TELEPHONE ENCOUNTER
[facility] pharmacy called to say his Westcort cream is not covered and needed authorization to switch to Betamethasone which is in the same class.  Patient was ok with the change and I authorized this today

## 2018-10-08 DIAGNOSIS — F90.2 ADHD (ATTENTION DEFICIT HYPERACTIVITY DISORDER), COMBINED TYPE: ICD-10-CM

## 2018-10-08 RX ORDER — DEXTROAMPHETAMINE SACCHARATE, AMPHETAMINE ASPARTATE MONOHYDRATE, DEXTROAMPHETAMINE SULFATE AND AMPHETAMINE SULFATE 6.25; 6.25; 6.25; 6.25 MG/1; MG/1; MG/1; MG/1
25 CAPSULE, EXTENDED RELEASE ORAL 2 TIMES DAILY
Qty: 60 CAPSULE | Refills: 0 | Status: SHIPPED | OUTPATIENT
Start: 2018-11-08 | End: 2019-02-26

## 2018-10-08 RX ORDER — DEXTROAMPHETAMINE SACCHARATE, AMPHETAMINE ASPARTATE MONOHYDRATE, DEXTROAMPHETAMINE SULFATE AND AMPHETAMINE SULFATE 6.25; 6.25; 6.25; 6.25 MG/1; MG/1; MG/1; MG/1
25 CAPSULE, EXTENDED RELEASE ORAL 2 TIMES DAILY
Qty: 60 CAPSULE | Refills: 0 | Status: SHIPPED | OUTPATIENT
Start: 2018-12-08 | End: 2019-02-26

## 2018-10-08 RX ORDER — DEXTROAMPHETAMINE SACCHARATE, AMPHETAMINE ASPARTATE MONOHYDRATE, DEXTROAMPHETAMINE SULFATE AND AMPHETAMINE SULFATE 6.25; 6.25; 6.25; 6.25 MG/1; MG/1; MG/1; MG/1
25 CAPSULE, EXTENDED RELEASE ORAL 2 TIMES DAILY
Qty: 60 CAPSULE | Refills: 0 | Status: SHIPPED | OUTPATIENT
Start: 2018-10-08 | End: 2019-02-26

## 2018-10-08 NOTE — TELEPHONE ENCOUNTER
Allan called for his Adderall refill for his 3 month supply    Pending Prescriptions:                       Disp   Refills    amphetamine-dextroamphetamine (ADDERALL X*30 cap*0            Sig: Take 1 capsule (25 mg) by mouth daily    amphetamine-dextroamphetamine (ADDERALL X*30 cap*0            Sig: Take 1 capsule (25 mg) by mouth daily    amphetamine-dextroamphetamine (ADDERALL X*30 cap*0            Sig: Take 1 capsule (25 mg) by mouth daily    Patient's phone #[phone]

## 2018-10-08 NOTE — TELEPHONE ENCOUNTER
Patient called and is hoping to be able to come get these within the next hour or two because he will be working all day the next week and will not have time to pick them up. I informed him that the request should be processed by today for sure.

## 2018-10-08 NOTE — TELEPHONE ENCOUNTER
Completed and signed 3 more months of medication. He will need office visit for further refills. Please inform.

## 2018-12-06 ENCOUNTER — OFFICE VISIT (OUTPATIENT)
Dept: FAMILY MEDICINE | Facility: CLINIC | Age: 28
End: 2018-12-06

## 2018-12-06 VITALS
HEART RATE: 88 BPM | HEIGHT: 71 IN | DIASTOLIC BLOOD PRESSURE: 78 MMHG | OXYGEN SATURATION: 99 % | TEMPERATURE: 98.4 F | BODY MASS INDEX: 31.36 KG/M2 | WEIGHT: 224 LBS | SYSTOLIC BLOOD PRESSURE: 110 MMHG

## 2018-12-06 DIAGNOSIS — F90.0 ADHD (ATTENTION DEFICIT HYPERACTIVITY DISORDER), INATTENTIVE TYPE: Primary | ICD-10-CM

## 2018-12-06 PROBLEM — F10.21 ALCOHOL DEPENDENCE IN REMISSION (H): Status: ACTIVE | Noted: 2018-07-10

## 2018-12-06 PROCEDURE — 99213 OFFICE O/P EST LOW 20 MIN: CPT | Performed by: PHYSICIAN ASSISTANT

## 2018-12-06 RX ORDER — DEXTROAMPHETAMINE SACCHARATE, AMPHETAMINE ASPARTATE MONOHYDRATE, DEXTROAMPHETAMINE SULFATE AND AMPHETAMINE SULFATE 7.5; 7.5; 7.5; 7.5 MG/1; MG/1; MG/1; MG/1
30 CAPSULE, EXTENDED RELEASE ORAL 2 TIMES DAILY
Qty: 60 CAPSULE | Refills: 0 | Status: SHIPPED | OUTPATIENT
Start: 2019-01-06 | End: 2019-02-26

## 2018-12-06 RX ORDER — DEXTROAMPHETAMINE SACCHARATE, AMPHETAMINE ASPARTATE MONOHYDRATE, DEXTROAMPHETAMINE SULFATE AND AMPHETAMINE SULFATE 7.5; 7.5; 7.5; 7.5 MG/1; MG/1; MG/1; MG/1
30 CAPSULE, EXTENDED RELEASE ORAL 2 TIMES DAILY
Qty: 60 CAPSULE | Refills: 0 | Status: SHIPPED | OUTPATIENT
Start: 2019-02-06 | End: 2019-02-26

## 2018-12-06 RX ORDER — DEXTROAMPHETAMINE SACCHARATE, AMPHETAMINE ASPARTATE MONOHYDRATE, DEXTROAMPHETAMINE SULFATE AND AMPHETAMINE SULFATE 7.5; 7.5; 7.5; 7.5 MG/1; MG/1; MG/1; MG/1
30 CAPSULE, EXTENDED RELEASE ORAL 2 TIMES DAILY
Qty: 60 CAPSULE | Refills: 0 | Status: SHIPPED | OUTPATIENT
Start: 2018-12-06 | End: 2019-02-26
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
## 2018-12-06 NOTE — MR AVS SNAPSHOT
After Visit Summary   12/6/2018    Allan Leon    MRN: [id number]           Patient Information     Date Of Birth          1990        Visit Information        Provider Department      12/6/2018 2:00 PM Madyson Rincon PA-C [facility]        Today's Diagnoses     ADHD (attention deficit hyperactivity disorder), inattentive type    -  1       Follow-ups after your visit        Follow-up notes from your care team     Return in about 6 months (around 6/6/2019), or if symptoms worsen or fail to improve.      Who to contact     If you have questions or need follow up information about today's clinic visit or your schedule please contact [facility] directly at [phone].  Normal or non-critical lab and imaging results will be communicated to you by [employer], letter or phone within 4 business days after the clinic has received the results. If you do not hear from us within 7 days, please contact the clinic through [employer] or phone. If you have a critical or abnormal lab result, we will notify you by phone as soon as possible.  Submit refill requests through [employer] or call your pharmacy and they will forward the refill request to us. Please allow 3 business days for your refill to be completed.          Additional Information About Your Visit        MyChart Information     [employer] gives you secure access to your electronic health record. If you see a primary care provider, you can also send messages to your care team and make appointments. If you have questions, please call your primary care clinic.  If you do not have a primary care provider, please call [phone] and they will assist you.        Care EveryWhere ID     This is your Care EveryWhere ID. This could be used by other organizations to access your [facility] medical records  [id number]        Your Vitals Were     Pulse Temperature Height Pulse Oximetry BMI (Body Mass Index)     "   88 98.4  F (36.9  C) (Oral) 1.803 m (5' 11\") 99% 31.24 kg/m2        Blood Pressure from Last 3 Encounters:   12/06/18 110/78   07/10/18 124/80   01/18/18 102/70    Weight from Last 3 Encounters:   12/06/18 101.6 kg (224 lb)   07/10/18 103.6 kg (228 lb 6.4 oz)   01/18/18 93.1 kg (205 lb 3.2 oz)              Today, you had the following     No orders found for display         Today's Medication Changes          These changes are accurate as of 12/6/18 11:48 PM.  If you have any questions, ask your nurse or doctor.               These medicines have changed or have updated prescriptions.        Dose/Directions    * amphetamine-dextroamphetamine 25 MG 24 hr capsule   Commonly known as:  ADDERALL XR   This may have changed:  Another medication with the same name was added. Make sure you understand how and when to take each.   Used for:  ADHD (attention deficit hyperactivity disorder), combined type   Changed by:  Madyson Rincon PA-C        Dose:  25 mg   Take 1 capsule (25 mg) by mouth 2 times daily   Quantity:  60 capsule   Refills:  0       * amphetamine-dextroamphetamine 30 MG 24 hr capsule   Commonly known as:  ADDERALL XR   This may have changed:  You were already taking a medication with the same name, and this prescription was added. Make sure you understand how and when to take each.   Used for:  ADHD (attention deficit hyperactivity disorder), inattentive type   Changed by:  Madyson Rincon PA-C        Dose:  30 mg   Take 1 capsule (30 mg) by mouth 2 times daily   Quantity:  60 capsule   Refills:  0       * amphetamine-dextroamphetamine 25 MG 24 hr capsule   Commonly known as:  ADDERALL XR   This may have changed:  Another medication with the same name was added. Make sure you understand how and when to take each.   Used for:  ADHD (attention deficit hyperactivity disorder), combined type   Changed by:  Madyson Rincon PA-C        Dose:  25 mg   Start taking on:  12/8/2018   Take 1 " capsule (25 mg) by mouth 2 times daily   Quantity:  60 capsule   Refills:  0       * amphetamine-dextroamphetamine 30 MG 24 hr capsule   Commonly known as:  ADDERALL XR   This may have changed:  You were already taking a medication with the same name, and this prescription was added. Make sure you understand how and when to take each.   Used for:  ADHD (attention deficit hyperactivity disorder), inattentive type   Changed by:  Madyson Rincon PA-C        Dose:  30 mg   Start taking on:  1/6/2019   Take 1 capsule (30 mg) by mouth 2 times daily   Quantity:  60 capsule   Refills:  0       * amphetamine-dextroamphetamine 30 MG 24 hr capsule   Commonly known as:  ADDERALL XR   This may have changed:  You were already taking a medication with the same name, and this prescription was added. Make sure you understand how and when to take each.   Used for:  ADHD (attention deficit hyperactivity disorder), inattentive type   Changed by:  Madyson Rincon PA-C        Dose:  30 mg   Start taking on:  2/6/2019   Take 1 capsule (30 mg) by mouth 2 times daily   Quantity:  60 capsule   Refills:  0       * Notice:  This list has 5 medication(s) that are the same as other medications prescribed for you. Read the directions carefully, and ask your doctor or other care provider to review them with you.         Where to get your medicines      Some of these will need a paper prescription and others can be bought over the counter.  Ask your nurse if you have questions.     Bring a paper prescription for each of these medications     amphetamine-dextroamphetamine 30 MG 24 hr capsule    amphetamine-dextroamphetamine 30 MG 24 hr capsule    amphetamine-dextroamphetamine 30 MG 24 hr capsule                Primary Care Provider Office Phone # Fax #    TJ Addison [phone]       [address]  [facility] [address]        Equal Access to Services     ADRIANO BYRNE AH: Aide martínez  Herson, jayda luoracioadaha, yevgeniyta kadomitila rahman, rosalinda vásquez robertadestin bachalya maico. So [facility] [phone].    ATENCIÓN: Si chantal finnegan, tiene a yousif disposición servicios gratuitos de asistencia lingüística. Rigo al [phone].    We comply with applicable federal civil rights laws and [place] laws. We do not discriminate on the basis of race, color, national origin, age, disability, sex, sexual orientation, or gender identity.            Thank you!     Thank you for choosing [facility]  for your care. Our goal is always to provide you with excellent care. Hearing back from our patients is one way we can continue to improve our services. Please take a few minutes to complete the written survey that you may receive in the mail after your visit with us. Thank you!             Your Updated Medication List - Protect others around you: Learn how to safely use, store and throw away your medicines at www.disposemymeds.org.          This list is accurate as of 12/6/18 11:48 PM.  Always use your most recent med list.                   Brand Name Dispense Instructions for use Diagnosis    * amphetamine-dextroamphetamine 25 MG 24 hr capsule    ADDERALL XR    60 capsule    Take 1 capsule (25 mg) by mouth 2 times daily    ADHD (attention deficit hyperactivity disorder), combined type       * amphetamine-dextroamphetamine 30 MG 24 hr capsule    ADDERALL XR    60 capsule    Take 1 capsule (30 mg) by mouth 2 times daily    ADHD (attention deficit hyperactivity disorder), inattentive type       * amphetamine-dextroamphetamine 25 MG 24 hr capsule   Start taking on:  12/8/2018    ADDERALL XR    60 capsule    Take 1 capsule (25 mg) by mouth 2 times daily    ADHD (attention deficit hyperactivity disorder), combined type       * amphetamine-dextroamphetamine 30 MG 24 hr capsule   Start taking on:  1/6/2019    ADDERALL XR    60 capsule    Take 1 capsule (30 mg) by mouth 2 times daily    ADHD  (attention deficit hyperactivity disorder), inattentive type       * amphetamine-dextroamphetamine 30 MG 24 hr capsule   Start taking on:  2/6/2019    ADDERALL XR    60 capsule    Take 1 capsule (30 mg) by mouth 2 times daily    ADHD (attention deficit hyperactivity disorder), inattentive type       betamethasone valerate 0.1 % external cream    VALISONE    45 g    Apply topically 2 times daily        hydrocortisone 2.5 % ointment     45 g    Apply topically 2 times daily as needed for rash (NO more than 14 days in a row.)    Dermatitis       sildenafil 20 MG tablet    REVATIO    30 tablet    Take 1-2 tablets (20-40 mg) by mouth as needed 30-60 minutes prior to intercourse. Never use with nitroglycerin, terazosin or doxazosin.    Erectile dysfunction, unspecified erectile dysfunction type       * Notice:  This list has 5 medication(s) that are the same as other medications prescribed for you. Read the directions carefully, and ask your doctor or other care provider to review them with you.

## 2018-12-26 ENCOUNTER — OFFICE VISIT (OUTPATIENT)
Dept: FAMILY MEDICINE | Facility: CLINIC | Age: 28
End: 2018-12-26

## 2018-12-26 VITALS
TEMPERATURE: 98.4 F | HEART RATE: 82 BPM | WEIGHT: 224 LBS | DIASTOLIC BLOOD PRESSURE: 70 MMHG | BODY MASS INDEX: 31.24 KG/M2 | OXYGEN SATURATION: 97 % | SYSTOLIC BLOOD PRESSURE: 112 MMHG

## 2018-12-26 DIAGNOSIS — M25.512 ACUTE PAIN OF LEFT SHOULDER: Primary | ICD-10-CM

## 2018-12-26 PROCEDURE — 99213 OFFICE O/P EST LOW 20 MIN: CPT | Performed by: PHYSICIAN ASSISTANT

## 2018-12-26 RX ORDER — CYCLOBENZAPRINE HCL 10 MG
10 TABLET ORAL 3 TIMES DAILY PRN
Qty: 30 TABLET | Refills: 0 | Status: SHIPPED | OUTPATIENT
Start: 2018-12-26 | End: 2019-02-26

## 2018-12-26 RX ORDER — METHYLPREDNISOLONE 4 MG
TABLET, DOSE PACK ORAL
Qty: 21 TABLET | Refills: 0 | Status: SHIPPED | OUTPATIENT
Start: 2018-12-26 | End: 2019-05-02

## 2018-12-26 NOTE — PROGRESS NOTES
CC: Neck/shoulder pain    History:  Allan was doing shoulder shrugs while working out a gym 1 week ago. Has been to chiropractor 3 times since that injury. Pain is from base of neck all the way out towards deltoid muscle of left arm. Pain is there all the time, but laying on his left side, or leaning neck to the right is painful. No numbness/tingling. Has been doing some icing. Taking iIbuprofen 4 tablets 4 times total in the past week, as when he takes it, even the 4 tablets does not seem to help. Pain is 6/10 with 10 being the worst at rest, pain goes up to an 8 when laying on it.     PMH, MEDICATIONS, ALLERGIES, SOCIAL AND FAMILY HISTORY in EPIC and reviewed by me personally.      ROS negative other than the symptoms noted above in the HPI.        Examination   /70 (BP Location: Right arm, Patient Position: Sitting, Cuff Size: Adult Large)   Pulse 82   Temp 98.4  F (36.9  C) (Oral)   Wt 101.6 kg (224 lb)   SpO2 97%   BMI 31.24 kg/m         Constitutional: Sitting comfortably, in no acute distress. Vital signs noted  Eyes: pupils equal round reactive to light and accomodation, extra ocular movements intact  Neck:  no adenopathy, trachea midline and normal to palpation  Cardiovascular:  regular rate and rhythm, no murmurs, clicks, or gallops  Respiratory:  normal respiratory rate and rhythm, lungs clear to auscultation  M/S: ROM of neck, shoulders intact. Most pain with right lateral head lean. Tender to palpation over C6 vertebrae. Minimal tenderness throughout remainder of left upper back/shoulder.  NEURO:  muscle strength intact with pain with lift off test, reflexes normal and symmetric  SKIN: No jaundice/pallor/rash.   Psychiatric: mentation appears normal and affect normal/bright        A/P    ICD-10-CM    1. Acute pain of left shoulder M25.512 methylPREDNISolone (MEDROL DOSEPAK) 4 MG tablet therapy pack     cyclobenzaprine (FLEXERIL) 10 MG tablet       DISCUSSION:  Consistent with possible disc  injury, muscle spasm/strain, versus rotator cuff injury.  Did try arm sling, but patient denies any relief.  Recommend Allan take Medrol dose pack to help with inflammation.  Take with food. Monitor for side effects.   Also wrote a prescription for cyclobenzaprine muscle relaxant. Can take up to 3 times daily, but warned it may make him drowsy so no driving.  Continue to apply ice/heat 2-3 times daily for 20 minutes. Continue to take ibuprofen 2 tablets every 4-6 hours with food.   If not resolved within 1 week, would have you see orthopedic specialist either by appt or walk-in urgent care.    follow up visit: As needed    Madyson Rincon PA-C  [facility]

## 2018-12-26 NOTE — PATIENT INSTRUCTIONS
Consistent with possible disc injury, muscle spasm, versus rotator cuff tear.  Recommend medrol dose pack to help with inflammation.  Take with food. Monitor for side effects.   Also wrote a prescription for cyclobenzaprine muscle relaxant. Can take up to 3 times daily, but may make you drowsy so no driving.  Continue to apply ice/heat 2-3 times daily for 20 minutes. Continue to take ibuprofen 2 tablets every 4-6 hours with food.   If not resolved within 1 week, would have you see orthopedic specialist.

## 2018-12-26 NOTE — NURSING NOTE
Allan is here today for shoulder pain.    Pre-visit Screening:  Immunizations:  up to date  Colonoscopy:  NA  Mammogram: NA  Asthma Action Test/Plan:  NA  PHQ9:  NA  GAD7:  NA  Questioned patient about current smoking habits Pt. has never smoked.  Ok to leave detailed message on voice mail for today's visit only Yes, phone # [phone]

## 2019-01-11 ENCOUNTER — HOSPITAL ENCOUNTER (OUTPATIENT)
Dept: MRI IMAGING | Facility: CLINIC | Age: 29
Discharge: HOME | End: 2019-01-11
Attending: FAMILY MEDICINE | Admitting: FAMILY MEDICINE
Payer: COMMERCIAL

## 2019-01-11 DIAGNOSIS — M54.12 CERVICAL RADICULAR PAIN: ICD-10-CM

## 2019-01-11 PROCEDURE — 72141 MRI NECK SPINE W/O DYE: CPT

## 2019-02-26 ENCOUNTER — OFFICE VISIT (OUTPATIENT)
Dept: FAMILY MEDICINE | Facility: CLINIC | Age: 29
End: 2019-02-26

## 2019-02-26 VITALS
WEIGHT: 223 LBS | HEART RATE: 115 BPM | DIASTOLIC BLOOD PRESSURE: 78 MMHG | BODY MASS INDEX: 31.22 KG/M2 | SYSTOLIC BLOOD PRESSURE: 112 MMHG | OXYGEN SATURATION: 97 % | HEIGHT: 71 IN | TEMPERATURE: 98.9 F

## 2019-02-26 DIAGNOSIS — F90.2 ADHD (ATTENTION DEFICIT HYPERACTIVITY DISORDER), COMBINED TYPE: Primary | ICD-10-CM

## 2019-02-26 DIAGNOSIS — Z87.09 HISTORY OF ASTHMA: ICD-10-CM

## 2019-02-26 PROCEDURE — 99213 OFFICE O/P EST LOW 20 MIN: CPT | Performed by: PHYSICIAN ASSISTANT

## 2019-02-26 RX ORDER — DEXTROAMPHETAMINE SACCHARATE, AMPHETAMINE ASPARTATE MONOHYDRATE, DEXTROAMPHETAMINE SULFATE AND AMPHETAMINE SULFATE 7.5; 7.5; 7.5; 7.5 MG/1; MG/1; MG/1; MG/1
30 CAPSULE, EXTENDED RELEASE ORAL 2 TIMES DAILY
Qty: 60 CAPSULE | Refills: 0 | Status: SHIPPED | OUTPATIENT
Start: 2019-03-26 | End: 2019-05-02

## 2019-02-26 RX ORDER — DEXTROAMPHETAMINE SACCHARATE, AMPHETAMINE ASPARTATE MONOHYDRATE, DEXTROAMPHETAMINE SULFATE AND AMPHETAMINE SULFATE 7.5; 7.5; 7.5; 7.5 MG/1; MG/1; MG/1; MG/1
30 CAPSULE, EXTENDED RELEASE ORAL 2 TIMES DAILY
Qty: 60 CAPSULE | Refills: 0 | Status: SHIPPED | OUTPATIENT
Start: 2019-02-26 | End: 2019-07-23

## 2019-02-26 RX ORDER — DEXTROAMPHETAMINE SACCHARATE, AMPHETAMINE ASPARTATE MONOHYDRATE, DEXTROAMPHETAMINE SULFATE AND AMPHETAMINE SULFATE 7.5; 7.5; 7.5; 7.5 MG/1; MG/1; MG/1; MG/1
30 CAPSULE, EXTENDED RELEASE ORAL 2 TIMES DAILY
Qty: 60 CAPSULE | Refills: 0 | Status: SHIPPED | OUTPATIENT
Start: 2019-04-26 | End: 2019-07-23

## 2019-02-26 ASSESSMENT — MIFFLIN-ST. JEOR: SCORE: 2013.65

## 2019-02-26 NOTE — PROGRESS NOTES
"CC: Medication Check    History:  Allan returns today to recheck Adderall medications. At previous appt 10/2018, he was increased from Adderall XR 25 mg BID up to Adderal XR 30 mg BID as he felt like it was wearing off later in the day.     Since that time he has been taking both the 30 mg capsules on most days, rarely skipping a day of medication.  Usually takes both in the morning as he feels this is most effective, but concentration benefit has usually won off by mid afternoon, and is still not getting improvement of symptoms that lasts into the evening. Weight is stable. Took both Adderall today. Does not monitor pulse at home. Denies any side effects. He is confused as to why this is less effective than it used to be years ago.    Allan also mentions that he continues to have left shoulder pain, but is working with physical therapist once weekly.       PMH, MEDICATIONS, ALLERGIES, SOCIAL AND FAMILY HISTORY in [facility] and reviewed by me personally.      ROS negative other than the symptoms noted above in the HPI.        Examination   /78 (BP Location: Left arm, Patient Position: Sitting, Cuff Size: Adult Large)   Pulse 115   Temp 98.9  F (37.2  C) (Oral)   Ht 1.803 m (5' 11\")   Wt 101.2 kg (223 lb)   SpO2 97%   BMI 31.10 kg/m         Constitutional: Sitting comfortably, in no acute distress. Vital signs noted  Eyes: pupils equal round reactive to light and accomodation, extra ocular movements intact  Neck:  no adenopathy, trachea midline and normal to palpation  Cardiovascular:  Mildly tachycardic rate and normal rhythm, no murmurs, clicks, or gallops  Respiratory:  normal respiratory rate and rhythm, lungs clear to auscultation  SKIN: No jaundice/pallor/rash.   Psychiatric: mentation appears normal and affect normal/bright        A/P    ICD-10-CM    1. ADHD (attention deficit hyperactivity disorder), combined type F90.2 amphetamine-dextroamphetamine (ADDERALL XR) 30 MG 24 hr capsule     " amphetamine-dextroamphetamine (ADDERALL XR) 30 MG 24 hr capsule     amphetamine-dextroamphetamine (ADDERALL XR) 30 MG 24 hr capsule   2. History of asthma Z87.09        DISCUSSION:  Concerned with elevated pulse today. Suspect this is due to take Adderall XR 60 mg all at once instead of BID like I had instructed him to. Explained to him how to check his pulse and would like him to ensure if is <100 bpm, once he returns to taking the medication twice daily instead of all at once, and contact me if still >100 bpm. Would need to lower dose back to 25 mg BID. Did discuss the option to decrease XR dosing to once daily, and add on immediate release 10-15 mg, but pt declines this. Also offer to switch to Vyvanse to see if more effective than Adderall, but pt declines this, stating current regmine is working well enough and doesn't want to change.     follow up visit:  3 months to recheck pulse    Madyson Rincon PA-C  [facility]

## 2019-02-26 NOTE — NURSING NOTE
Allan is here for a med check.          Pre-visit Screening:  Immunizations:  Up to date  Colonoscopy:  NA  Mammogram: NA  Asthma Action Test/Plan:  Done today  PHQ9:  NA  GAD7:  NA  Questioned patient about current smoking habits Pt. has never smoked.  Ok to leave detailed message on voice mail for today's visit only Yes, phone # [phone]

## 2019-02-27 ASSESSMENT — ASTHMA QUESTIONNAIRES: ACT_TOTALSCORE: 25

## 2019-04-29 ENCOUNTER — TRANSFERRED RECORDS (OUTPATIENT)
Dept: FAMILY MEDICINE | Facility: CLINIC | Age: 29
End: 2019-04-29

## 2019-05-02 ENCOUNTER — OFFICE VISIT (OUTPATIENT)
Dept: FAMILY MEDICINE | Facility: CLINIC | Age: 29
End: 2019-05-02

## 2019-05-02 VITALS
DIASTOLIC BLOOD PRESSURE: 68 MMHG | SYSTOLIC BLOOD PRESSURE: 118 MMHG | WEIGHT: 229 LBS | HEIGHT: 71 IN | HEART RATE: 79 BPM | TEMPERATURE: 98.8 F | BODY MASS INDEX: 32.06 KG/M2 | OXYGEN SATURATION: 98 %

## 2019-05-02 DIAGNOSIS — J31.0 CHRONIC RHINITIS: ICD-10-CM

## 2019-05-02 DIAGNOSIS — J01.01 ACUTE RECURRENT MAXILLARY SINUSITIS: Primary | ICD-10-CM

## 2019-05-02 PROCEDURE — 99213 OFFICE O/P EST LOW 20 MIN: CPT | Performed by: PHYSICIAN ASSISTANT

## 2019-05-02 ASSESSMENT — MIFFLIN-ST. JEOR: SCORE: 2040.87

## 2019-05-02 NOTE — NURSING NOTE
Allan is here for sinus and throat pain on right side, as well as trouble breathing.          Pre-visit Screening:  Immunizations:  up to date  Colonoscopy:  NA  Mammogram: NA  Asthma Action Test/Plan:  NA  PHQ9:  None  GAD7:  None  Questioned patient about current smoking habits Pt. has never smoked.  Ok to leave detailed message on voice mail for today's visit only Yes, phone # [phone]

## 2019-05-02 NOTE — PROGRESS NOTES
"CC: SInus issues    History:  Allan is here today with concerns over sinus symptoms and throat pain mainly on the right side. This started 2 weeks ago. Has been having nasal drainage and PND. This is also causing trouble breathing through nose. Has been taking Zrytec daily all year round, and Flonase as well for his seasonal allergies.     Tried Advil, Mucinex.     Allan took his Adderall today. He had had an elevated pulse at last Adderall med check.     PMH, MEDICATIONS, ALLERGIES, SOCIAL AND FAMILY HISTORY in [facility] and reviewed by me personally.      ROS negative other than the symptoms noted above in the HPI.        Examination   /68 (BP Location: Left arm, Patient Position: Sitting, Cuff Size: Adult Large)   Pulse 79   Temp 98.8  F (37.1  C) (Oral)   Ht 1.803 m (5' 11\")   Wt 103.9 kg (229 lb)   SpO2 98%   BMI 31.94 kg/m         Constitutional: Sitting comfortably, in no acute distress. Vital signs noted  Ears: external canals and TMs free of abnormalities  Nose: patent, without mucosal abnormalities. Severe nasal congestion on right side. No tenderness to palpation over sinuses.  Mouth and throat: without erythema or lesions of the mucosa  Neck:  no adenopathy, trachea midline and normal to palpation  Cardiovascular:  regular rate and rhythm, no murmurs, clicks, or gallops  Respiratory:  normal respiratory rate and rhythm, lungs clear to auscultation  SKIN: No jaundice/pallor/rash.   Psychiatric: mentation appears normal and affect normal/bright        A/P    ICD-10-CM    1. Acute recurrent maxillary sinusitis J01.01 amoxicillin-clavulanate (AUGMENTIN) 875-125 MG tablet   2. Chronic rhinitis J31.0 amoxicillin-clavulanate (AUGMENTIN) 875-125 MG tablet       DISCUSSION:  Explained to Allan that symptoms are likely due to a infectious sinus infection, that occurred in the setting of underlying allergic rhinitis given seasonal timeframe. Recommended he try another 1-2 day of Sudafed, and even consider " Afrin (for up to 3 days), with nasal rinses as well.     If not improving at at that time, Given duration of symptoms, recommended he complete 10 day course of Augmentin. He should take this twice daily with food, and should consider taking probiotic or eating yogurt in between. Warned him of possible side effects including loose stool. He should contact after 1 week on the antibiotics if symptoms are not improving, or sooner with any intolerable side effects or worsening symptoms.    Reassured by normal pulse today, despite being on Adderall    follow up visit: As needed    Madyson Rincon PA-C  [facility]

## 2019-05-02 NOTE — LETTER
[facility]  [address], [address]  [place]  [address]    May 2, 2019        Patient Name: Allan Leon  : 1990                To Whom It May Concern:    Due to illness, please excuse Allan from school responsibilities 2019.     If you have any further questions or problems, please contact our office at [phone].          Madyson Rincon PA-C

## 2019-05-23 DIAGNOSIS — F90.2 ADHD (ATTENTION DEFICIT HYPERACTIVITY DISORDER), COMBINED TYPE: Primary | ICD-10-CM

## 2019-05-23 RX ORDER — DEXTROAMPHETAMINE SACCHARATE, AMPHETAMINE ASPARTATE MONOHYDRATE, DEXTROAMPHETAMINE SULFATE AND AMPHETAMINE SULFATE 7.5; 7.5; 7.5; 7.5 MG/1; MG/1; MG/1; MG/1
30 CAPSULE, EXTENDED RELEASE ORAL 2 TIMES DAILY
Qty: 60 CAPSULE | Refills: 0 | Status: SHIPPED | OUTPATIENT
Start: 2019-07-26 | End: 2020-01-09

## 2019-05-23 RX ORDER — DEXTROAMPHETAMINE SACCHARATE, AMPHETAMINE ASPARTATE MONOHYDRATE, DEXTROAMPHETAMINE SULFATE AND AMPHETAMINE SULFATE 7.5; 7.5; 7.5; 7.5 MG/1; MG/1; MG/1; MG/1
30 CAPSULE, EXTENDED RELEASE ORAL 2 TIMES DAILY
Qty: 60 CAPSULE | Refills: 0 | Status: SHIPPED | OUTPATIENT
Start: 2019-06-26 | End: 2019-07-23

## 2019-05-23 RX ORDER — DEXTROAMPHETAMINE SACCHARATE, AMPHETAMINE ASPARTATE MONOHYDRATE, DEXTROAMPHETAMINE SULFATE AND AMPHETAMINE SULFATE 7.5; 7.5; 7.5; 7.5 MG/1; MG/1; MG/1; MG/1
30 CAPSULE, EXTENDED RELEASE ORAL 2 TIMES DAILY
Qty: 60 CAPSULE | Refills: 0 | Status: SHIPPED | OUTPATIENT
Start: 2019-05-26 | End: 2019-07-23

## 2019-05-23 NOTE — TELEPHONE ENCOUNTER
Approved 3 months refill. Please contact to inform pt. Please inform pt that start day for medications is exactly 3 months from prior refill, which wouldn't be until 5/26/2019, so he will not be able to fill until Sunday, but he can  scripts from us during business hours as soon as today.

## 2019-05-23 NOTE — TELEPHONE ENCOUNTER
Allan called  line requesting a refill for 3 months worth of Adderall prescriptions.  Seen 2/26/19 for ADHD.        Pending Prescriptions:                       Disp   Refills    amphetamine-dextroamphetamine (ADDERALL X*60 cap*0            Sig: Take 1 capsule (30 mg) by mouth 2 times daily    amphetamine-dextroamphetamine (ADDERALL X*60 cap*0            Sig: Take 1 capsule (30 mg) by mouth 2 times daily    amphetamine-dextroamphetamine (ADDERALL X*60 cap*0            Sig: Take 1 capsule (30 mg) by mouth 2 times daily        Call when ready for : [phone]

## 2019-07-23 ENCOUNTER — OFFICE VISIT (OUTPATIENT)
Dept: FAMILY MEDICINE | Facility: CLINIC | Age: 29
End: 2019-07-23

## 2019-07-23 VITALS
SYSTOLIC BLOOD PRESSURE: 142 MMHG | BODY MASS INDEX: 31.98 KG/M2 | HEART RATE: 76 BPM | WEIGHT: 228.4 LBS | HEIGHT: 71 IN | RESPIRATION RATE: 20 BRPM | TEMPERATURE: 98.6 F | DIASTOLIC BLOOD PRESSURE: 78 MMHG

## 2019-07-23 DIAGNOSIS — F10.11 HISTORY OF ALCOHOL ABUSE: ICD-10-CM

## 2019-07-23 DIAGNOSIS — F90.0 ADHD (ATTENTION DEFICIT HYPERACTIVITY DISORDER), INATTENTIVE TYPE: Primary | ICD-10-CM

## 2019-07-23 DIAGNOSIS — F41.1 GENERALIZED ANXIETY DISORDER: ICD-10-CM

## 2019-07-23 DIAGNOSIS — Z87.820 HISTORY OF CONCUSSION: ICD-10-CM

## 2019-07-23 DIAGNOSIS — F32.1 MODERATE MAJOR DEPRESSION (H): ICD-10-CM

## 2019-07-23 DIAGNOSIS — R63.5 WEIGHT GAIN: ICD-10-CM

## 2019-07-23 PROCEDURE — 36415 COLL VENOUS BLD VENIPUNCTURE: CPT | Performed by: PHYSICIAN ASSISTANT

## 2019-07-23 PROCEDURE — 84443 ASSAY THYROID STIM HORMONE: CPT | Mod: 90 | Performed by: PHYSICIAN ASSISTANT

## 2019-07-23 PROCEDURE — 99213 OFFICE O/P EST LOW 20 MIN: CPT | Performed by: PHYSICIAN ASSISTANT

## 2019-07-23 ASSESSMENT — MIFFLIN-ST. JEOR: SCORE: 2033.15

## 2019-07-23 NOTE — NURSING NOTE
Allan Leon is here for a consult for his Adderall.    Questioned patient about current smoking habits.  Pt. has never smoked.  PULSE regular  My Chart: active  CLASSIFICATION OF OVERWEIGHT AND OBESITY BY BMI                        Obesity Class           BMI(kg/m2)  Underweight                                    < 18.5  Normal                                         18.5-24.9  Overweight                                     25.0-29.9  OBESITY                     I                  30.0-34.9                             II                 35.0-39.9  EXTREME OBESITY             III                >40                            Patient's  BMI Body mass index is 31.86 kg/m .  http://hin.nhlbi.nih.gov/menuplanner/menu.cgi  Pre-visit planning  Immunizations - up to date  Colonoscopy -   Mammogram -   Asthma -   PHQ9 -    MARYA-7 -

## 2019-07-23 NOTE — PROGRESS NOTES
"CC: Medication Check    History:  Allan is here today concerned with poor concentration and fatigue.     He has been treated with Adderall XR 30 mg twice daily for ADHD, but he feels that this Adderall dose has become ineffective. He is getting a bad grade in a class he is taking this summer to graduate. Has been on Ritalin before as a child. He still has 1 month supply left from last ADHD visit that it isn't quite time to fill yet.    Also discussed, is Allan's anxiety and depression, which he feels are both interfering with his quality of life right now.      Allan has been seen at [facility] 5/2018 to discuss depression and anxiety, as well as some problems he was having at that time with alcohol abuse after sustaining a concussion. Now only drinks 2-3 times per month.     PMH, MEDICATIONS, ALLERGIES, SOCIAL AND FAMILY HISTORY in [facility] and reviewed by me personally.    ROS negative other than the symptoms noted above in the HPI.      Examination   /78 (BP Location: Left arm, Patient Position: Chair, Cuff Size: Adult Regular)   Pulse 76   Temp 98.6  F (37  C) (Oral)   Resp 20   Ht 1.803 m (5' 11\")   Wt 103.6 kg (228 lb 6.4 oz)   BMI 31.86 kg/m         Constitutional: Sitting comfortably, in no acute distress. Vital signs noted  Eyes: pupils equal round reactive to light and accomodation, extra ocular movements intact  SKIN: No jaundice/pallor/rash.   Psychiatric: mentation appears normal and affect normal/bright      A/P    ICD-10-CM    1. ADHD (attention deficit hyperactivity disorder), inattentive type F90.0 MENTAL HEALTH REFERRAL  - Adult; Psychiatry and Medication Management; Psychiatry; [facility]: [facility] Psychiatry Service [phone].  Medication management & future refills will be returned to [facility] PCP upon completion of evaluation; We stephen...   2. Moderate major depression (H) F32.1 MENTAL HEALTH REFERRAL  - Adult; Psychiatry and Medication Management; Psychiatry; [facility]: [facility] " [facility] Psychiatry Service [phone].  Medication management & future refills will be returned to [facility] PCP upon completion of evaluation; We stephen...   3. Generalized anxiety disorder F41.1 MENTAL HEALTH REFERRAL  - Adult; Psychiatry and Medication Management; Psychiatry; [facility]: [facility] Psychiatry Service [phone].  Medication management & future refills will be returned to [facility] PCP upon completion of evaluation; We stephen...   4. Weight gain R63.5 VENOUS COLLECTION     TSH with free T4 reflex ([facility])   5. History of concussion Z87.820 MENTAL HEALTH REFERRAL  - Adult; Psychiatry and Medication Management; Psychiatry; [facility]: [facility] Psychiatry Service [phone].  Medication management & future refills will be returned to [facility] PCP upon completion of evaluation; We stephen...   6. History of alcohol abuse Z87.898 MENTAL HEALTH REFERRAL  - Adult; Psychiatry and Medication Management; Psychiatry; [facility]: [facility] Psychiatry Service [phone].  Medication management & future refills will be returned to [facility] PCP upon completion of evaluation; We stephen...       DISCUSSION: Contacting [facility] hotline for immediate instruction, and will refer to psych. Will contact pt w update in next 1-2 days.     Update:  Talked to Psychiatric Assistance Line, Facundo Killian DO.     Spoke to Allan via phone, and per discussion with Dr. killian, recommended Allan be started on Lexapro 10 mg to treat anxiety and depression. Can eventually taper up to 20 mg. Sent Allan detailed [employer] message to recap phone conversation, knowing that his concentration has been worse.     If after 2-4 weeks on Lexapro, pt still not improving, could consider switch to Concerta 54 mg to see if Adderall had become ineffective.    Will refer to psychiatry for further management.    follow up visit: 1-2 months    Madyson Rincon PA-C  [facility]

## 2019-07-24 ENCOUNTER — MYC MEDICAL ADVICE (OUTPATIENT)
Dept: FAMILY MEDICINE | Facility: CLINIC | Age: 29
End: 2019-07-24

## 2019-07-24 ENCOUNTER — TELEPHONE (OUTPATIENT)
Dept: FAMILY MEDICINE | Facility: CLINIC | Age: 29
End: 2019-07-24

## 2019-07-24 DIAGNOSIS — F32.1 MODERATE MAJOR DEPRESSION (H): Primary | ICD-10-CM

## 2019-07-24 DIAGNOSIS — F41.1 GENERALIZED ANXIETY DISORDER: ICD-10-CM

## 2019-07-24 LAB — TSH SERPL-ACNC: 1.32 MIU/L (ref 0.4–4.5)

## 2019-07-24 RX ORDER — ESCITALOPRAM OXALATE 10 MG/1
10 TABLET ORAL DAILY
Qty: 30 TABLET | Refills: 1 | Status: SHIPPED | OUTPATIENT
Start: 2019-07-24 | End: 2019-08-26 | Stop reason: SINTOL

## 2019-07-24 NOTE — TELEPHONE ENCOUNTER
Talked to Psychiatric Assistance Line, Facundo Berger DO.    Recommended Allan be started on Lexapro 10 mg to treat anxiety and depression. Can eventually taper up to 20 mg.    If after 2-4 weeks on Lexapro, pt still not improving, could consider switch to Concerta 54 mg to see if Adderall had become ineffective.  Called and spoke to Allan. Sent [employer] message to him summarizing phone conversation.

## 2019-08-26 ENCOUNTER — OFFICE VISIT (OUTPATIENT)
Dept: FAMILY MEDICINE | Facility: CLINIC | Age: 29
End: 2019-08-26

## 2019-08-26 VITALS
WEIGHT: 225 LBS | TEMPERATURE: 98 F | BODY MASS INDEX: 31.5 KG/M2 | SYSTOLIC BLOOD PRESSURE: 116 MMHG | OXYGEN SATURATION: 98 % | DIASTOLIC BLOOD PRESSURE: 68 MMHG | HEART RATE: 68 BPM | HEIGHT: 71 IN

## 2019-08-26 DIAGNOSIS — F90.0 ADHD (ATTENTION DEFICIT HYPERACTIVITY DISORDER), INATTENTIVE TYPE: Primary | ICD-10-CM

## 2019-08-26 DIAGNOSIS — F32.1 MODERATE MAJOR DEPRESSION (H): ICD-10-CM

## 2019-08-26 DIAGNOSIS — N52.9 ERECTILE DYSFUNCTION, UNSPECIFIED ERECTILE DYSFUNCTION TYPE: ICD-10-CM

## 2019-08-26 DIAGNOSIS — F41.1 GENERALIZED ANXIETY DISORDER: ICD-10-CM

## 2019-08-26 PROCEDURE — 99214 OFFICE O/P EST MOD 30 MIN: CPT | Performed by: PHYSICIAN ASSISTANT

## 2019-08-26 RX ORDER — DEXTROAMPHETAMINE SACCHARATE, AMPHETAMINE ASPARTATE MONOHYDRATE, DEXTROAMPHETAMINE SULFATE AND AMPHETAMINE SULFATE 7.5; 7.5; 7.5; 7.5 MG/1; MG/1; MG/1; MG/1
30 CAPSULE, EXTENDED RELEASE ORAL 2 TIMES DAILY
Qty: 60 CAPSULE | Refills: 0 | Status: SHIPPED | OUTPATIENT
Start: 2019-09-26 | End: 2020-01-09

## 2019-08-26 RX ORDER — SILDENAFIL 25 MG/1
TABLET, FILM COATED ORAL
Qty: 30 TABLET | Refills: 0 | Status: SHIPPED | OUTPATIENT
Start: 2019-08-26 | End: 2020-06-26

## 2019-08-26 RX ORDER — FLUOXETINE 10 MG/1
10 CAPSULE ORAL DAILY
Qty: 30 CAPSULE | Refills: 1 | Status: SHIPPED | OUTPATIENT
Start: 2019-08-26 | End: 2020-01-09

## 2019-08-26 RX ORDER — DEXTROAMPHETAMINE SACCHARATE, AMPHETAMINE ASPARTATE MONOHYDRATE, DEXTROAMPHETAMINE SULFATE AND AMPHETAMINE SULFATE 7.5; 7.5; 7.5; 7.5 MG/1; MG/1; MG/1; MG/1
30 CAPSULE, EXTENDED RELEASE ORAL 2 TIMES DAILY
Qty: 60 CAPSULE | Refills: 0 | Status: SHIPPED | OUTPATIENT
Start: 2019-08-26 | End: 2020-01-09

## 2019-08-26 RX ORDER — DEXTROAMPHETAMINE SACCHARATE, AMPHETAMINE ASPARTATE MONOHYDRATE, DEXTROAMPHETAMINE SULFATE AND AMPHETAMINE SULFATE 7.5; 7.5; 7.5; 7.5 MG/1; MG/1; MG/1; MG/1
30 CAPSULE, EXTENDED RELEASE ORAL 2 TIMES DAILY
Qty: 60 CAPSULE | Refills: 0 | Status: SHIPPED | OUTPATIENT
Start: 2019-10-26 | End: 2020-01-09

## 2019-08-26 SDOH — HEALTH STABILITY: MENTAL HEALTH: HOW OFTEN DO YOU HAVE A DRINK CONTAINING ALCOHOL?: 2-4 TIMES A MONTH

## 2019-08-26 SDOH — HEALTH STABILITY: MENTAL HEALTH: HOW OFTEN DO YOU HAVE 6 OR MORE DRINKS ON ONE OCCASION?: NEVER

## 2019-08-26 SDOH — HEALTH STABILITY: MENTAL HEALTH: HOW MANY STANDARD DRINKS CONTAINING ALCOHOL DO YOU HAVE ON A TYPICAL DAY?: 1 OR 2

## 2019-08-26 ASSESSMENT — ANXIETY QUESTIONNAIRES
7. FEELING AFRAID AS IF SOMETHING AWFUL MIGHT HAPPEN: NOT AT ALL
3. WORRYING TOO MUCH ABOUT DIFFERENT THINGS: NEARLY EVERY DAY
GAD7 TOTAL SCORE: 12
6. BECOMING EASILY ANNOYED OR IRRITABLE: MORE THAN HALF THE DAYS
IF YOU CHECKED OFF ANY PROBLEMS ON THIS QUESTIONNAIRE, HOW DIFFICULT HAVE THESE PROBLEMS MADE IT FOR YOU TO DO YOUR WORK, TAKE CARE OF THINGS AT HOME, OR GET ALONG WITH OTHER PEOPLE: VERY DIFFICULT
1. FEELING NERVOUS, ANXIOUS, OR ON EDGE: MORE THAN HALF THE DAYS
2. NOT BEING ABLE TO STOP OR CONTROL WORRYING: MORE THAN HALF THE DAYS
5. BEING SO RESTLESS THAT IT IS HARD TO SIT STILL: NOT AT ALL

## 2019-08-26 ASSESSMENT — MIFFLIN-ST. JEOR: SCORE: 2017.72

## 2019-08-26 ASSESSMENT — PATIENT HEALTH QUESTIONNAIRE - PHQ9
SUM OF ALL RESPONSES TO PHQ QUESTIONS 1-9: 15
5. POOR APPETITE OR OVEREATING: NEARLY EVERY DAY

## 2019-08-26 NOTE — NURSING NOTE
Allan is here for a med check.        Pre-visit Screening:  Immunizations:  up to date  Colonoscopy:  NA  Mammogram: NA  Asthma Action Test/Plan:  NA  PHQ9:  Done today  GAD7:  Done today  Questioned patient about current smoking habits Pt. has never smoked.  Ok to leave detailed message on voice mail for today's visit only Yes, phone # [phone]

## 2019-08-26 NOTE — PROGRESS NOTES
"CC: Medication Check    History:  Allan is here today to discuss Adderall, and new medication Lexapro. Has been feeling pretty good on Adderall, as it works for most of the day. Was last here 7/23/2019. At that time, we were concerned about his ADHD not being well controlled, as he was failing out of 1 of his 3 summer classes. We also found that he was experiencing anxiety and depression to the point where he should receive more intensive therapy. I recommended he schedule with psychiatrist. In the meantime, spoke with PALs provider, who recommended start on Lexapro 5 mg then up to 10 mg.     He has been taking the lexapro, but getting sexual side effects, and no benefit to mental health. Anxiety and depression are better, but thinks this is because he is out of school, not the medication. Fortunately, no SI/HI.    Feels like ADHD is okay, and not interested in     Unfortunately, has not scheduled with psychiatry. His plan for this fall, is to live at home with parents, and take [employer] [place] [employer] this fall, which is the one he dropped from the summer, through [employer].     PMH, MEDICATIONS, ALLERGIES, SOCIAL AND FAMILY HISTORY in [facility] and reviewed by me personally.    ROS negative other than the symptoms noted above in the HPI.      Examination   /68 (BP Location: Right arm, Patient Position: Sitting, Cuff Size: Adult Large)   Pulse 68   Temp 98  F (36.7  C) (Oral)   Ht 1.803 m (5' 11\")   Wt 102.1 kg (225 lb)   SpO2 98%   BMI 31.38 kg/m       Constitutional: Sitting comfortably, in no acute distress. Vital signs noted  Eyes: pupils equal round reactive to light and accomodation, extra ocular movements intact  Neck:  no adenopathy, trachea midline and normal to palpation  Cardiovascular:  regular rate and rhythm, no murmurs, clicks, or gallops  Respiratory:  normal respiratory rate and rhythm, lungs clear to auscultation  SKIN: No jaundice/pallor/rash.   Psychiatric: mentation appears normal and affect " normal/bright      A/P    ICD-10-CM    1. ADHD (attention deficit hyperactivity disorder), inattentive type F90.0 amphetamine-dextroamphetamine (ADDERALL XR) 30 MG 24 hr capsule     amphetamine-dextroamphetamine (ADDERALL XR) 30 MG 24 hr capsule     amphetamine-dextroamphetamine (ADDERALL XR) 30 MG 24 hr capsule   2. Moderate major depression (H) F32.1 FLUoxetine (PROZAC) 10 MG capsule   3. Generalized anxiety disorder F41.1 FLUoxetine (PROZAC) 10 MG capsule   4. Erectile dysfunction, unspecified erectile dysfunction type N52.9 sildenafil (VIAGRA) 25 MG tablet     DISCUSSION:  Agree that Allan is doing better today, but concerned that anxiety and depression are still not well controlled. Given side effects on Lexapro will switch to Prozac. He can switch to this directly. Will work with our referral coordinator to get him scheduled to see psychiatry.     Considered trial of Concerta, but pt prefers to stay on Adderal as he feels this is working well. Will refill at same doses for 3 months, as long as being managed by psych at that time for other medications, would give 3 additional refills at that time without being seen. However, if not in with psych by then, would need to be seen in 2-3 months to follow-up on Prozac. Urged him to contact me sooner with any worsening.    Refilled sildenafil to use as needed. He denies any side effects, and feels this is effective.    follow up visit: 2 months, ideally with psych. ADHD visit 6 months.    Madyson Rincon PA-C  [facility]

## 2019-08-27 ASSESSMENT — ANXIETY QUESTIONNAIRES: GAD7 TOTAL SCORE: 12

## 2019-09-26 ENCOUNTER — TELEPHONE (OUTPATIENT)
Dept: FAMILY MEDICINE | Facility: CLINIC | Age: 29
End: 2019-09-26

## 2019-09-26 ENCOUNTER — TRANSFERRED RECORDS (OUTPATIENT)
Dept: FAMILY MEDICINE | Facility: CLINIC | Age: 29
End: 2019-09-26

## 2019-09-26 DIAGNOSIS — R41.3 MEMORY LOSS: ICD-10-CM

## 2019-09-26 DIAGNOSIS — S06.9X0S TRAUMATIC BRAIN INJURY, WITHOUT LOSS OF CONSCIOUSNESS, SEQUELA (H): Primary | ICD-10-CM

## 2019-09-26 DIAGNOSIS — R41.89 COGNITIVE CHANGE: ICD-10-CM

## 2019-09-26 NOTE — TELEPHONE ENCOUNTER
Called and spoke to Marina who helps coordinate neuropsych testing for [facility]. They are unable to take [employer] at this time due to credentialing, but she transferred me to Sophie at [facility]. Called and left message for Sophie that I would like Allan to be scheduled for neuropsych testing with neurology follow-up for possible TBI. Asked her to call me back with update.

## 2019-09-26 NOTE — TELEPHONE ENCOUNTER
Received call from Jessica Pham from [facility]. She felt that he needs neuropsych evaluation for recheck for TBI.

## 2019-09-30 ENCOUNTER — HEALTH MAINTENANCE LETTER (OUTPATIENT)
Age: 29
End: 2019-09-30

## 2019-10-08 NOTE — TELEPHONE ENCOUNTER
Spoke with Sophie. Send neuropsych eval referral to Sophie in [place] [phone] (fax). They will contact pt to schedule neuropsych testing and follow-up with Dr. Beltran. Will have Kina fax.

## 2019-10-08 NOTE — TELEPHONE ENCOUNTER
Called [place] location again to see if they an confirm pt did or did not have neuropsych testing. They will have Dr. Beltran call me back.

## 2019-10-28 NOTE — TELEPHONE ENCOUNTER
I called [facility] [place] ( [phone] ), talked with Irasema. Patient has an appt 11/8/2019 @ 8:00am with     Dr. Sharee Lugo  [facility]  [address]  [address]  [place] [address]  [phone] -- appt line  [phone] -- fax    I asked Irasema if Madyson Rincon PA-C was listed / noted to receive notes. Irasema has it noted in patients chart to send notes to PÉREZ Beasley

## 2019-10-29 ENCOUNTER — TELEPHONE (OUTPATIENT)
Dept: FAMILY MEDICINE | Facility: CLINIC | Age: 29
End: 2019-10-29

## 2019-10-29 NOTE — TELEPHONE ENCOUNTER
Allan called to say he needs a letter explaining his concussion faxed to [phone] as he has one class he needs to pass in order to get his degree.  He has failed it twice so he is saying they need an explanation as to why he is having trouble concentrating and an explanation of the concussion.  Please advise    I do not see that he has been seen here for a concussion?  I tried calling patient but no answer and no VM to leave a message.    Patient's phone #[phone]

## 2019-10-29 NOTE — TELEPHONE ENCOUNTER
This letter would come from neurology. Kina, do you have record of when he is scheduled for follow-up with neuropsych and neuro?

## 2019-10-29 NOTE — TELEPHONE ENCOUNTER
I called [facility] [place] ( [phone] ), talked with Irasema. Patient has an appt 11/8/2019 @ 8:00am with      Dr. Sharee Lugo  [facility]  [address] [facility] [address]  [place] [address]  [phone] -- appt line  [phone] -- fax

## 2019-11-04 ENCOUNTER — TELEPHONE (OUTPATIENT)
Dept: FAMILY MEDICINE | Facility: CLINIC | Age: 29
End: 2019-11-04

## 2019-11-04 NOTE — TELEPHONE ENCOUNTER
Patient called regarding the letter needed for school. Per the notes in his chart, I told him to have this letter created after his visit with neurology on November 8th.

## 2019-11-11 ENCOUNTER — TRANSFERRED RECORDS (OUTPATIENT)
Dept: FAMILY MEDICINE | Facility: CLINIC | Age: 29
End: 2019-11-11

## 2019-11-22 DIAGNOSIS — F90.0 ADHD (ATTENTION DEFICIT HYPERACTIVITY DISORDER), INATTENTIVE TYPE: ICD-10-CM

## 2019-11-22 RX ORDER — DEXTROAMPHETAMINE SACCHARATE, AMPHETAMINE ASPARTATE MONOHYDRATE, DEXTROAMPHETAMINE SULFATE AND AMPHETAMINE SULFATE 7.5; 7.5; 7.5; 7.5 MG/1; MG/1; MG/1; MG/1
30 CAPSULE, EXTENDED RELEASE ORAL 2 TIMES DAILY
Qty: 60 CAPSULE | Refills: 0 | Status: SHIPPED | OUTPATIENT
Start: 2020-01-25 | End: 2020-02-24

## 2019-11-22 RX ORDER — DEXTROAMPHETAMINE SACCHARATE, AMPHETAMINE ASPARTATE MONOHYDRATE, DEXTROAMPHETAMINE SULFATE AND AMPHETAMINE SULFATE 7.5; 7.5; 7.5; 7.5 MG/1; MG/1; MG/1; MG/1
30 CAPSULE, EXTENDED RELEASE ORAL 2 TIMES DAILY
Qty: 60 CAPSULE | Refills: 0 | Status: CANCELLED | OUTPATIENT
Start: 2019-11-25

## 2019-11-22 RX ORDER — DEXTROAMPHETAMINE SACCHARATE, AMPHETAMINE ASPARTATE MONOHYDRATE, DEXTROAMPHETAMINE SULFATE AND AMPHETAMINE SULFATE 7.5; 7.5; 7.5; 7.5 MG/1; MG/1; MG/1; MG/1
30 CAPSULE, EXTENDED RELEASE ORAL 2 TIMES DAILY
Qty: 60 CAPSULE | Refills: 0 | Status: SHIPPED | OUTPATIENT
Start: 2019-11-25 | End: 2020-01-09

## 2019-11-22 RX ORDER — DEXTROAMPHETAMINE SACCHARATE, AMPHETAMINE ASPARTATE MONOHYDRATE, DEXTROAMPHETAMINE SULFATE AND AMPHETAMINE SULFATE 7.5; 7.5; 7.5; 7.5 MG/1; MG/1; MG/1; MG/1
30 CAPSULE, EXTENDED RELEASE ORAL 2 TIMES DAILY
Qty: 60 CAPSULE | Refills: 0 | Status: SHIPPED | OUTPATIENT
Start: 2019-12-25 | End: 2020-02-24

## 2020-01-07 ENCOUNTER — TRANSFERRED RECORDS (OUTPATIENT)
Dept: FAMILY MEDICINE | Facility: CLINIC | Age: 30
End: 2020-01-07

## 2020-01-09 ENCOUNTER — OFFICE VISIT (OUTPATIENT)
Dept: FAMILY MEDICINE | Facility: CLINIC | Age: 30
End: 2020-01-09

## 2020-01-09 VITALS
WEIGHT: 215 LBS | TEMPERATURE: 97.8 F | HEIGHT: 71 IN | BODY MASS INDEX: 30.1 KG/M2 | HEART RATE: 86 BPM | SYSTOLIC BLOOD PRESSURE: 110 MMHG | DIASTOLIC BLOOD PRESSURE: 62 MMHG | OXYGEN SATURATION: 98 %

## 2020-01-09 DIAGNOSIS — F43.21 ADJUSTMENT DISORDER WITH DEPRESSED MOOD: ICD-10-CM

## 2020-01-09 DIAGNOSIS — S06.9X9S TRAUMATIC BRAIN INJURY WITH LOSS OF CONSCIOUSNESS, SEQUELA (H): ICD-10-CM

## 2020-01-09 DIAGNOSIS — K29.00 ACUTE SUPERFICIAL GASTRITIS, PRESENCE OF BLEEDING UNSPECIFIED: ICD-10-CM

## 2020-01-09 DIAGNOSIS — G44.321 INTRACTABLE CHRONIC POST-TRAUMATIC HEADACHE: Primary | ICD-10-CM

## 2020-01-09 PROCEDURE — 99213 OFFICE O/P EST LOW 20 MIN: CPT | Performed by: PHYSICIAN ASSISTANT

## 2020-01-09 RX ORDER — OMEPRAZOLE 40 MG/1
40 CAPSULE, DELAYED RELEASE ORAL DAILY
Qty: 30 CAPSULE | Refills: 1 | Status: SHIPPED | OUTPATIENT
Start: 2020-01-09 | End: 2020-01-09

## 2020-01-09 RX ORDER — ESCITALOPRAM OXALATE 10 MG/1
TABLET ORAL
Refills: 1 | COMMUNITY
Start: 2019-07-24 | End: 2020-01-22

## 2020-01-09 RX ORDER — RIZATRIPTAN BENZOATE 10 MG/1
TABLET ORAL
COMMUNITY
Start: 2019-11-25 | End: 2020-02-24

## 2020-01-09 RX ORDER — TOPIRAMATE 50 MG/1
TABLET, FILM COATED ORAL
COMMUNITY
Start: 2019-11-25 | End: 2020-02-24

## 2020-01-09 RX ORDER — OMEPRAZOLE 40 MG/1
40 CAPSULE, DELAYED RELEASE ORAL DAILY
Qty: 30 CAPSULE | Refills: 1 | Status: SHIPPED | OUTPATIENT
Start: 2020-01-09 | End: 2020-08-18

## 2020-01-09 ASSESSMENT — MIFFLIN-ST. JEOR: SCORE: 1972.36

## 2020-01-09 NOTE — PROGRESS NOTES
"CC: Throat clearing, ongoing headaches, mood changes    History:  Throat symptoms: Allan is here today with throat symptoms that worsened started 2 months ago, but it been going on for several months. Constant throat clearing, left nostril stuffed. He looked in the back of his throat, and thought he saw throat cancer according to what he saw on the internet. Has been using Flonase for over 2 months. Has not tried stopping chew to see if this helps the worsening.     Headaches: Patient explains that he is \"going [F-word]ing nuts\" due to constant pain in head. He has been working with [facility] clinic of neurology, and he has been frustrated with this as he does not feel like the provider is listening. At the most recent appt 10 days ago, had an injection of Emgality, which actually doubled his pain. He continues to feel highly irritable given his chronic pain.     Abdominal Pain: Still getting RUQ pain. Was in the ER 10/2019 with pain and concerns about alcohol. Was prescribed protonix at that appt, but admits he continued drinking alcohol and has only been sober for 1-2 months now.     PMH, MEDICATIONS, ALLERGIES, SOCIAL AND FAMILY HISTORY in Psychiatric and reviewed by me personally.    ROS negative other than the symptoms noted above in the HPI.     Examination   /62 (BP Location: Right arm, Patient Position: Sitting, Cuff Size: Adult Large)   Pulse 86   Temp 97.8  F (36.6  C) (Oral)   Ht 1.803 m (5' 11\")   Wt 97.5 kg (215 lb)   SpO2 98%   BMI 29.99 kg/m       Constitutional: Sitting comfortably, in no acute distress. Vital signs noted  Eyes: pupils equal round reactive to light and accomodation, extra ocular movements intact  Neck:  no adenopathy, trachea midline and normal to palpation  Cardiovascular:  regular rate and rhythm, no murmurs, clicks, or gallops  Respiratory:  normal respiratory rate and rhythm, lungs clear to auscultation  NEURO:  cranial nerves 2-12 intact, muscle strength normal, reflexes normal " and symmetric  SKIN: No jaundice/pallor/rash.   Psychiatric: mentation appears normal and affect normal/bright      A/P    ICD-10-CM    1. Intractable chronic post-traumatic headache G44.321 Other Specialty Referral   2. Traumatic brain injury with loss of consciousness, sequela (H) S06.9X9S    3. Adjustment disorder with depressed mood F43.21 Other Specialty Referral   4. Acute superficial gastritis, presence of bleeding unspecified K29.00 omeprazole (PRILOSEC) 40 MG DR capsule     DISCONTINUED: omeprazole (PRILOSEC) 40 MG DR capsule       DISCUSSION:  Throat symptoms and abdominal pain: Suspect this is gastritis. Advised 8 week trial of omeprazole 40 mg.     Headaches: At this point, I feel that Allan needs more advanced care. His headaches are still not controlled, and it is greatly interfering with his quality of life. Will refer to TBI clinic in [place] to consider further treatment options. Informed Allan and his father Don who is with him today.     follow up visit: As needed    Madyson Rincon PA-C  [facility]

## 2020-01-09 NOTE — NURSING NOTE
Allan is here for throat pain for 2 months, ongoing headaches since concussion 2 years ago, and GI pain.          Pre-visit Screening:  Immunizations:  up to date  Colonoscopy:  NA  Mammogram: NA  Asthma Action Test/Plan:  NA  PHQ9:  Up to date  GAD7:  Up to date  Questioned patient about current smoking habits Pt. has never smoked.  Ok to leave detailed message on voice mail for today's visit only Yes, phone # cell

## 2020-01-14 ENCOUNTER — TELEPHONE (OUTPATIENT)
Dept: FAMILY MEDICINE | Facility: CLINIC | Age: 30
End: 2020-01-14

## 2020-01-14 NOTE — TELEPHONE ENCOUNTER
Allan's dad Don called to ask if you could please call him to discuss Allan.  There is NOT a PMI on file to speak to his dad however.    Zane's phone #[phone]

## 2020-01-15 NOTE — TELEPHONE ENCOUNTER
Patients shantal, Don called this morning RE patient. Zane is stating that patient is getting worse, he can see the change daily.  I also went over with Zane that we have to have PMI from patient to discuss medical.      Zane (shantal) said that he will bring patient in to sign the Auth to release PMI today or later this week.      Zane did ask about [facility]. I did give him the phone # ([phone]). Zane said that he will call to ask about scheduling an appt for his son / patient. Zane will call back with patients/son appt info if he schedules.    After hanging up with Zane (shantal) I called [facility] ( [phone] ) to check status of the referral that was faxed 1/10/2020. I was told that as of today no appointment is made. I was also told that it can take a few days for the referral to be looked at and entered. I was advised to follow up at the end of the week.     TAMY

## 2020-01-15 NOTE — TELEPHONE ENCOUNTER
LM on VM letting Don know we do not have permission from Allan to discuss anything with dad so [facility] will not call until there is permission.

## 2020-01-22 ENCOUNTER — OFFICE VISIT (OUTPATIENT)
Dept: PHYSICAL MEDICINE AND REHAB | Facility: CLINIC | Age: 30
End: 2020-01-22
Payer: COMMERCIAL

## 2020-01-22 VITALS
SYSTOLIC BLOOD PRESSURE: 120 MMHG | HEIGHT: 71 IN | HEART RATE: 93 BPM | BODY MASS INDEX: 31.5 KG/M2 | DIASTOLIC BLOOD PRESSURE: 71 MMHG | OXYGEN SATURATION: 98 % | WEIGHT: 225 LBS

## 2020-01-22 DIAGNOSIS — R45.4 IRRITABILITY: ICD-10-CM

## 2020-01-22 DIAGNOSIS — G93.0 INTRACRANIAL ARACHNOID CYST: ICD-10-CM

## 2020-01-22 DIAGNOSIS — Z87.820 H/O CONCUSSION: ICD-10-CM

## 2020-01-22 DIAGNOSIS — G44.309 POST-CONCUSSION HEADACHE: Primary | ICD-10-CM

## 2020-01-22 DIAGNOSIS — R41.840 ATTENTION AND CONCENTRATION DEFICIT: ICD-10-CM

## 2020-01-22 RX ORDER — PROPRANOLOL HYDROCHLORIDE 60 MG/1
1 TABLET ORAL
Qty: 60 TABLET | Refills: 1 | Status: SHIPPED | OUTPATIENT
Start: 2020-01-22 | End: 2020-02-17

## 2020-01-22 RX ORDER — DIVALPROEX SODIUM 250 MG/1
250 TABLET, DELAYED RELEASE ORAL EVERY 12 HOURS PRN
COMMUNITY
End: 2020-02-24

## 2020-01-22 ASSESSMENT — PATIENT HEALTH QUESTIONNAIRE - PHQ9
5. POOR APPETITE OR OVEREATING: NEARLY EVERY DAY
SUM OF ALL RESPONSES TO PHQ QUESTIONS 1-9: 11

## 2020-01-22 ASSESSMENT — ANXIETY QUESTIONNAIRES
7. FEELING AFRAID AS IF SOMETHING AWFUL MIGHT HAPPEN: MORE THAN HALF THE DAYS
IF YOU CHECKED OFF ANY PROBLEMS ON THIS QUESTIONNAIRE, HOW DIFFICULT HAVE THESE PROBLEMS MADE IT FOR YOU TO DO YOUR WORK, TAKE CARE OF THINGS AT HOME, OR GET ALONG WITH OTHER PEOPLE: EXTREMELY DIFFICULT
1. FEELING NERVOUS, ANXIOUS, OR ON EDGE: MORE THAN HALF THE DAYS
3. WORRYING TOO MUCH ABOUT DIFFERENT THINGS: MORE THAN HALF THE DAYS
5. BEING SO RESTLESS THAT IT IS HARD TO SIT STILL: SEVERAL DAYS
2. NOT BEING ABLE TO STOP OR CONTROL WORRYING: MORE THAN HALF THE DAYS

## 2020-01-22 ASSESSMENT — MIFFLIN-ST. JEOR: SCORE: 2017.72

## 2020-01-22 ASSESSMENT — PAIN SCALES - GENERAL: PAINLEVEL: SEVERE PAIN (6)

## 2020-01-22 NOTE — NURSING NOTE
"Chief Complaint   Patient presents with     Head Injury     concussion       Vitals:    01/22/20 1625   BP: 120/71   Pulse: 93   SpO2: 98%   Weight: 102.1 kg (225 lb)   Height: 1.803 m (5' 11\")       Body mass index is 31.38 kg/m .      MARYA-7 SCORE 8/26/2019   Total Score 12     PHQ-9 SCORE 8/26/2019 1/22/2020   PHQ-9 Total Score 15 11     CONCUSSION SYMPTOMS ASSESSMENT 1/22/2020 1/22/2020   Headache or Pressure In Head 4 - moderate to severe 3 - moderate   Upset Stomach or Throwing Up 0 - none 0 - none   Problems with Balance 0 - none 2 - mild to moderate   Feeling Dizzy 0 - none 5 - severe   Sensitivity to Light 0 - none 6 - excruciating   Sensitivity to Noise 0 - none 3 - moderate   Mood Changes 5 - severe 3 - moderate   Feeling sluggish, hazy, or foggy 3 - moderate 2 - mild to moderate   Trouble Concentrating, Lack of Focus 6 - excruciating 4 - moderate to severe   Motion Sickness 0 - none 2 - mild to moderate   Vision Changes 0 - none 0 - none   Memory Problems 5 - severe 1 - mild   Feeling Confused 5 - severe 3 - moderate   Neck Pain 5 - severe 0 - none   Trouble Sleeping 2 - mild to moderate 0 - none   Total Number of Symptoms 8 11   Symptom Severity Score 35 34                      "

## 2020-01-22 NOTE — LETTER
July 13, 2020      Workability Form for Allan Leon, 1990 who is under my care for a concussion that occurred on 12/19.     Date of most recent exam: 7/13/2020  Date of next exam: 10/13/2020    Diagnosis:    Concussion without loss of consciousness  S06.0X0D   Post concussion syndrome   F07.81  Allan Leon may return to work as of 7/13/20 with the following restrictions:  1. Work- 3 half days a week   2. Must have a 10 minute break every 2 hours.    The following accommodations may help in reducing the cognitive load, thereby minimizing post-concussion symptoms.  As the employer, it is encouraged to discuss and establish accommodations based on individual work responsibilities.  If symptoms persist, more formal accommodations may be necessary.   1)  Allow more time for, or delay for projects.   2)  Allow more time for work completion.   3)  Allow for reduced work load.   4)  Allow for less multi-task work.  Single tasks until completion will improve productivity.   5)  Allow breaks as needed to control symptom levels.    6)  Provide a quiet area for lunch.   7)  Allow use of sunglasses during the day.     Full or partial days missed due to post-concussion symptoms and follow up appointments should be medically excused.    Follow up evaluation and revision of recommendations to occur on 10/13/2020.    Please feel free to contact me at the number below with any questions or concerns.    Sincerely,    Aj Salinas DO  Concussion Clinic  [facility]  Clinic nurse line: [phone]

## 2020-01-22 NOTE — LETTER
January 22, 2020    Workability Form for Allan Leon, 1990 who is under my care for a concussion that occurred on 12/19.     Date of most recent exam: 1/22/2020  Date of next exam: 2/19/2020    Diagnosis:    Concussion without loss of consciousness  S06.0X0D   Post concussion syndrome   F07.81  Allan Leon may return to work as of 1/22/20 with the following restrictions:  1. Work- 3 half days a week   2. Must have a 10 minute break every 2 hours.    The following accommodations may help in reducing the cognitive load, thereby minimizing post-concussion symptoms.  As the employer, it is encouraged to discuss and establish accommodations based on individual work responsibilities.  If symptoms persist, more formal accommodations may be necessary.   1)  Allow more time for, or delay for projects.   2)  Allow more time for work completion.   3)  Allow for reduced work load.   4)  Allow for less multi-task work.  Single tasks until completion will improve productivity.   5)  Allow breaks as needed to control symptom levels.    6)  Provide a quiet area for lunch.   7)  Allow use of sunglasses during the day.     Full or partial days missed due to post-concussion symptoms and follow up appointments should be medically excused.    Follow up evaluation and revision of recommendations to occur on 6/1/2020.    Please feel free to contact me at the number below with any questions or concerns.    Sincerely,    Aj Salinas DO    Concussion Clinic  [facility]  Clinic nurse line: [phone]  Fax: [phone]

## 2020-01-22 NOTE — PATIENT INSTRUCTIONS
"SCHOOL  ~ Work with your professors regarding a plan to help you be successful in your courses  ~ Please talk to your school's disabilities counselor. They have additional resources for success and will be able to help you with formal accommodations with your tests and assignments.  ~Consider dropping courses that are elective in order to conserve your efforts on more important classes.    ~ Take breaks from your studies every 20 minutes (set an alarm!)  ~ Alternate your activities to limit screen time  ~ Reading books alternating with paper writing, for example  ~ Try using a piece of paper line-by line or a \"line reader\" to help make reading more comfortable.  ~Expect to take more time to accomplish your assignments and tasks than you are used to  ~You may need more time on tests    GENERAL ADVICE:  ~ Gradually ease back into your usual activities.   ~ Rest as needed to help your symptoms go away.  - Consider pairing 50 minutes of activity with 10 minutes of rest.  ~ Allow yourself more time for activities.  ~ Write things down.  At home, at work, whenever there is something that you should remember, even it is simple.  SCREENS:  ~ Change settings on your phone and computer using the \"Blue Light Filter\" (Night Shift on all  Apple products)  ~ The goal is making screens more yellow and less blue.    ~ If this is not an option you can download this program, Happy Cloud, to adjust your screen resolution.  ~ [employer] for various filter and font apps  ~ Turn screen brightness down  ~ Increase font size  ~ Limit screen activities including computer, TV and phones.  NECK PAIN:  ~ Ice or Heat are good~  ~ Massage is ok if it doesn't trigger more symptoms~  ~ Gentle stretches and range-of-motion are helpful.  DIZZINESS:  ~ No driving when dizzy.  ~ No biking, climbing heights or ladders if dizzy.  FATIGUE:  ~ Daily exercise is strongly encouraged.  Start with a 10 min walk and increase the time as tolerated until " "you are walking 30 minutes per day.    ~ Focus on Good sleep hygiene instead of napping . Your goal should be 8 hours of sleep every night.  ~ Try Melatonin 1 hour before bed  ANXIETY OR MOOD SWINGS:  ~ If you are irritable or anxious, take a break in a quiet room.  ~ Try using the free Calm johnny for guided breathing and mindfulness/meditation.  ~ Explore SeMeAntoja.com (https://www.headspace.com) for free and easy-to-use meditation guidance.  LIGHT SENSITIVITIES:  ~ Avoid florescent lighting when possible.  ~Yellow or kyra tinted lenses may help reduce computer or night-time road glare.             ~ Amazon has a $10.00 option: [employer] yellow Night Vision.  NOISE SENSITIVITIES:  ~ Try listening to calming sounds such as the \"Calm Johnny\" to help shift your focus off of more irritating sounds.  ~ Avoid crowded areas at first then slowly introduce yourself to small increments of crowded, noisy areas.  ~ Try High fidelity earplugs used by Musicians. Etymotic ETY-Plugs, can be found on Amazon for $13.00.  DIET:  - In principle incorporate more protein, lots of veggies, some fruit, whole grains.    - Less sweets and saturated fat.   - Mediterranean Diet is an easy-to-follow example.  ~ Drink plenty of water throughout the day (8-10 glasses per day)    PM&R / M [facility]   Nurse Line # [phone]   Fax # [phone]  Scheduling; # [phone]  E-MAIL - [email or web address]    Thank you for allowing us to be a part of your care.         "
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
## 2020-01-24 ENCOUNTER — ANCILLARY PROCEDURE (OUTPATIENT)
Dept: MRI IMAGING | Facility: CLINIC | Age: 30
End: 2020-01-24
Attending: PHYSICAL MEDICINE & REHABILITATION
Payer: COMMERCIAL

## 2020-01-24 DIAGNOSIS — G93.0 INTRACRANIAL ARACHNOID CYST: ICD-10-CM

## 2020-01-24 RX ORDER — GADOBUTROL 604.72 MG/ML
10 INJECTION INTRAVENOUS ONCE
Status: COMPLETED | OUTPATIENT
Start: 2020-01-24 | End: 2020-01-24

## 2020-01-24 RX ADMIN — GADOBUTROL 10 ML: 604.72 INJECTION INTRAVENOUS at 19:43

## 2020-01-25 NOTE — DISCHARGE INSTRUCTIONS
MRI Contrast Discharge Instructions    The IV contrast you received today will pass out of your body in your  urine. This will happen in the next 24 hours. You will not feel this process.  Your urine will not change color.    Drink at least 4 extra glasses of water or juice today (unless your doctor  has restricted your fluids). This reduces the stress on your kidneys.  You may take your regular medicines.    If you are on dialysis: It is best to have dialysis today.    If you have a reaction: Most reactions happen right away. If you have  any new symptoms after leaving the hospital (such as hives or swelling),  call your hospital at the correct number below. Or call your family doctor.  If you have breathing distress or wheezing, call 911.    Special instructions: ***    I have read and understand the above information.    Signature:______________________________________ Date:___________    Staff:__________________________________________ Date:___________     Time:__________    [place] [facility] Departments:    ___[place]: [phone]  ___[facility]: [phone]  ___[place]: [phone] ___[facility]: [phone]  ___[facility]: [phone]  ___[facility]: [phone]  ___[phone]  ___[facility]: [phone]  ___[phone]

## 2020-01-28 ENCOUNTER — MYC MEDICAL ADVICE (OUTPATIENT)
Dept: PHYSICAL MEDICINE AND REHAB | Facility: CLINIC | Age: 30
End: 2020-01-28

## 2020-01-29 ENCOUNTER — TELEPHONE (OUTPATIENT)
Dept: PHYSICAL MEDICINE AND REHAB | Facility: CLINIC | Age: 30
End: 2020-01-29

## 2020-01-29 NOTE — TELEPHONE ENCOUNTER
[facility] Call Center    Phone Message    May a detailed message be left on voicemail: yes    Reason for Call: Other:   Pia is following up on a short term disability claim. Pia would like to verify dates of service, pt's restrictions/limitations, plan of action, surgery. Was pt advised to stop working? If so, is there an anticipated return to work date? Pia will also fax forms to clinic to fill out. Please fax forms back.     Action Taken: Message routed to:  [facility] ([facility] enma

## 2020-02-17 ENCOUNTER — OFFICE VISIT (OUTPATIENT)
Dept: PHYSICAL MEDICINE AND REHAB | Facility: CLINIC | Age: 30
End: 2020-02-17
Payer: COMMERCIAL

## 2020-02-17 VITALS
HEART RATE: 59 BPM | HEIGHT: 71 IN | SYSTOLIC BLOOD PRESSURE: 105 MMHG | OXYGEN SATURATION: 100 % | WEIGHT: 225 LBS | BODY MASS INDEX: 31.5 KG/M2 | DIASTOLIC BLOOD PRESSURE: 56 MMHG

## 2020-02-17 DIAGNOSIS — G44.309 POST-CONCUSSION HEADACHE: ICD-10-CM

## 2020-02-17 DIAGNOSIS — S16.1XXD CERVICAL MYOFASCIAL STRAIN, SUBSEQUENT ENCOUNTER: ICD-10-CM

## 2020-02-17 DIAGNOSIS — Z86.69 HX OF MIGRAINES: Primary | ICD-10-CM

## 2020-02-17 DIAGNOSIS — G47.9 SLEEP DISTURBANCE: ICD-10-CM

## 2020-02-17 RX ORDER — BUTALBITAL, ACETAMINOPHEN AND CAFFEINE 50; 325; 40 MG/1; MG/1; MG/1
1 TABLET ORAL DAILY PRN
Qty: 14 TABLET | Refills: 0 | Status: SHIPPED | OUTPATIENT
Start: 2020-02-17 | End: 2020-03-02

## 2020-02-17 RX ORDER — DESIPRAMINE HYDROCHLORIDE 25 MG/1
25 TABLET ORAL AT BEDTIME
Qty: 30 TABLET | Refills: 0 | Status: SHIPPED | OUTPATIENT
Start: 2020-02-17 | End: 2020-02-17

## 2020-02-17 RX ORDER — TRAZODONE HYDROCHLORIDE 50 MG/1
50 TABLET, FILM COATED ORAL
Qty: 30 TABLET | Refills: 0 | Status: SHIPPED | OUTPATIENT
Start: 2020-02-17 | End: 2020-03-02

## 2020-02-17 ASSESSMENT — PAIN SCALES - GENERAL: PAINLEVEL: MILD PAIN (3)

## 2020-02-17 ASSESSMENT — MIFFLIN-ST. JEOR: SCORE: 2017.72
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
## 2020-02-17 NOTE — PATIENT INSTRUCTIONS
"Airports are very busy,chaotic and bright. The airplane can also be quite loud. Be prepared with tylenol, tinted glasses and perhaps some earplugs to help manage your symptoms.If you are flying out of the [facility] airport concourse \"A\" is generally the quietest of concourses if you need a place to rest.  .SCHOOL  ~ Work with your professors regarding a plan to help you be successful in your courses  ~ Please talk to your school's disabilities counselor. They have additional resources for success and will be able to help you with formal accommodations with your tests and assignments.  ~Consider dropping courses that are elective in order to conserve your efforts on more important classes.    ~ Take breaks from your studies every 20 minutes (set an alarm!)  ~ Alternate your activities to limit screen time  ~ Reading books alternating with paper writing, for example  ~ Try using a piece of paper line-by line or a \"line reader\" to help make reading more comfortable.  ~Expect to take more time to accomplish your assignments and tasks than you are used to  ~You may need more time on tests  GENERAL ADVICE:  ~ Gradually ease back into your usual activities.   ~ Rest as needed to help your symptoms go away.  - Consider pairing 50 minutes of activity with 10 minutes of rest.  ~ Allow yourself more time for activities.  ~ Write things down.  At home, at work, whenever there is something that you should remember, even it is simple.  SCREENS:  ~ Change settings on your phone and computer using the \"Blue Light Filter\" (Night Shift on all  Apple products)  ~ The goal is making screens more yellow and less blue.    ~ If this is not an option you can download this program, Fuel (fuelpowered.com), to adjust your screen resolution.  ~ Alma Johns for various filter and font apps  ~ Turn screen brightness down  ~ Increase font size  ~ Limit screen activities including computer, TV and phones.  NECK PAIN:  ~ Ice or Heat are good~  ~ Massage is ok " "if it doesn't trigger more symptoms~  ~ Gentle stretches and range-of-motion are helpful.  DIZZINESS:  ~ No driving when dizzy.  ~ No biking, climbing heights or ladders if dizzy.  FATIGUE:  ~ Daily exercise is strongly encouraged.  Start with a 10 min walk and increase the time as tolerated until you are walking 30 minutes per day.    ~ Focus on Good sleep hygiene instead of napping . Your goal should be 8 hours of sleep every night.  ~ Try Melatonin 1 hour before bed  ANXIETY OR MOOD SWINGS:  ~ If you are irritable or anxious, take a break in a quiet room.  ~ Try using the free Calm johnny for guided breathing and mindfulness/meditation.  ~ Explore Common Sensing (https://www.headspace.com) for free and easy-to-use meditation guidance.  LIGHT SENSITIVITIES:  ~ Avoid florescent lighting when possible.  ~Yellow or kyra tinted lenses may help reduce computer or night-time road glare.             ~ Amazon has a $10.00 option: [employer]s yellow Night Vision.  NOISE SENSITIVITIES:  ~ Try listening to calming sounds such as the \"Calm Johnny\" to help shift your focus off of more irritating sounds.  ~ Avoid crowded areas at first then slowly introduce yourself to small increments of crowded, noisy areas.  ~ Try High fidelity earplugs used by Musicians. Etymotic ETY-Plugs, can be found on Amazon for $13.00.  DIET:  - In principle incorporate more protein, lots of veggies, some fruit, whole grains.    - Less sweets and saturated fat.   - Mediterranean Diet is an easy-to-follow example.  ~ Drink plenty of water throughout the day (8-10 glasses per day)    PM&R / M [facility]   Nurse Line # [phone]   Fax # [phone]  Scheduling; # [phone]  E-MAIL - [email or web address]    Thank you for allowing us to be a part of your care.              "

## 2020-02-17 NOTE — NURSING NOTE
"Chief Complaint   Patient presents with     Head Injury     concussion 12/19/2019- Fall while sowboarding with posterior head strike 12/2019       Vitals:    02/17/20 1638   BP: 105/56   Pulse: 59   SpO2: 100%   Weight: 102.1 kg (225 lb)   Height: 1.803 m (5' 11\")       Body mass index is 31.38 kg/m .      MARYA-7 SCORE 8/26/2019   Total Score 12     PHQ-9 SCORE 8/26/2019 1/22/2020   PHQ-9 Total Score 15 11     CONCUSSION SYMPTOMS ASSESSMENT 1/22/2020 1/22/2020 2/17/2020   Headache or Pressure In Head 4 - moderate to severe 3 - moderate 2 - mild to moderate   Upset Stomach or Throwing Up 0 - none 0 - none 0 - none   Problems with Balance 0 - none 2 - mild to moderate 0 - none   Feeling Dizzy 0 - none 5 - severe 2 - mild to moderate   Sensitivity to Light 0 - none 6 - excruciating 0 - none   Sensitivity to Noise 0 - none 3 - moderate 2 - mild to moderate   Mood Changes 5 - severe 3 - moderate 3 - moderate   Feeling sluggish, hazy, or foggy 3 - moderate 2 - mild to moderate 3 - moderate   Trouble Concentrating, Lack of Focus 6 - excruciating 4 - moderate to severe 5 - severe   Motion Sickness 0 - none 2 - mild to moderate 0 - none   Vision Changes 0 - none 0 - none 0 - none   Memory Problems 5 - severe 1 - mild 4 - moderate to severe   Feeling Confused 5 - severe 3 - moderate 4 - moderate to severe   Neck Pain 5 - severe 0 - none 4 - moderate to severe   Trouble Sleeping 2 - mild to moderate 0 - none 4 - moderate to severe   Total Number of Symptoms 8 11 10   Symptom Severity Score 35 34 33                         "

## 2020-02-24 ENCOUNTER — OFFICE VISIT (OUTPATIENT)
Dept: FAMILY MEDICINE | Facility: CLINIC | Age: 30
End: 2020-02-24

## 2020-02-24 VITALS
DIASTOLIC BLOOD PRESSURE: 70 MMHG | BODY MASS INDEX: 30.35 KG/M2 | SYSTOLIC BLOOD PRESSURE: 114 MMHG | HEIGHT: 71 IN | HEART RATE: 109 BPM | TEMPERATURE: 98 F | WEIGHT: 216.8 LBS | OXYGEN SATURATION: 99 %

## 2020-02-24 DIAGNOSIS — F90.0 ADHD (ATTENTION DEFICIT HYPERACTIVITY DISORDER), INATTENTIVE TYPE: Primary | ICD-10-CM

## 2020-02-24 PROCEDURE — 99213 OFFICE O/P EST LOW 20 MIN: CPT | Performed by: PHYSICIAN ASSISTANT

## 2020-02-24 RX ORDER — DEXTROAMPHETAMINE SACCHARATE, AMPHETAMINE ASPARTATE MONOHYDRATE, DEXTROAMPHETAMINE SULFATE AND AMPHETAMINE SULFATE 7.5; 7.5; 7.5; 7.5 MG/1; MG/1; MG/1; MG/1
30 CAPSULE, EXTENDED RELEASE ORAL 2 TIMES DAILY
Qty: 60 CAPSULE | Refills: 0 | Status: SHIPPED | OUTPATIENT
Start: 2020-03-24 | End: 2020-08-18

## 2020-02-24 RX ORDER — DEXTROAMPHETAMINE SACCHARATE, AMPHETAMINE ASPARTATE MONOHYDRATE, DEXTROAMPHETAMINE SULFATE AND AMPHETAMINE SULFATE 7.5; 7.5; 7.5; 7.5 MG/1; MG/1; MG/1; MG/1
30 CAPSULE, EXTENDED RELEASE ORAL 2 TIMES DAILY
Qty: 60 CAPSULE | Refills: 0 | Status: SHIPPED | OUTPATIENT
Start: 2020-02-24 | End: 2020-08-18

## 2020-02-24 RX ORDER — DEXTROAMPHETAMINE SACCHARATE, AMPHETAMINE ASPARTATE MONOHYDRATE, DEXTROAMPHETAMINE SULFATE AND AMPHETAMINE SULFATE 7.5; 7.5; 7.5; 7.5 MG/1; MG/1; MG/1; MG/1
30 CAPSULE, EXTENDED RELEASE ORAL 2 TIMES DAILY
Qty: 60 CAPSULE | Refills: 0 | Status: SHIPPED | OUTPATIENT
Start: 2020-04-24 | End: 2020-08-18

## 2020-02-24 ASSESSMENT — MIFFLIN-ST. JEOR: SCORE: 1980.53

## 2020-02-24 NOTE — PROGRESS NOTES
"CC: Medication Check    History:  ADHD: Taking Adderall XR 30 mg twice daily one in morning and one in afternoon. No side effects from this including racing heart, sweats, worsening headache, insomnia, sexual side effects. Feels like it is working well to control focus. Can tell he can't focus well when he stops taking.     Headaches: Allan recently met with PM&R and is making progress with headaches. He got an exemption from [employer], and just has to take [employer] class to fulfill requirement. Would have Bachelor's then.     Getting endoscopy tomorrow. Still getting throat sensation.    PMH, MEDICATIONS, ALLERGIES, SOCIAL AND FAMILY HISTORY in [facility] and reviewed by me personally.    ROS negative other than the symptoms noted above in the HPI.    Examination   /70 (BP Location: Right arm, Patient Position: Sitting, Cuff Size: Adult Large)   Pulse 109   Temp 98  F (36.7  C) (Oral)   Ht 1.803 m (5' 11\")   Wt 98.3 kg (216 lb 12.8 oz)   SpO2 99%   BMI 30.24 kg/m       Constitutional: Sitting comfortably, in no acute distress. Vital signs noted  Eyes: pupils equal round reactive to light and accomodation, extra ocular movements intact  Ears: external canals and TMs free of abnormalities  Nose: patent, without mucosal abnormalities  Mouth and throat: without erythema or lesions of the mucosa  Neck:  no adenopathy, trachea midline and normal to palpation  Cardiovascular:  regular rate and rhythm, no murmurs, clicks, or gallops  Respiratory:  normal respiratory rate and rhythm, lungs clear to auscultation  SKIN: No jaundice/pallor/rash.   Psychiatric: mentation appears normal and affect normal/bright    A/P    ICD-10-CM    1. ADHD (attention deficit hyperactivity disorder), inattentive type F90.0 amphetamine-dextroamphetamine (ADDERALL XR) 30 MG 24 hr capsule     amphetamine-dextroamphetamine (ADDERALL XR) 30 MG 24 hr capsule     amphetamine-dextroamphetamine (ADDERALL XR) 30 MG 24 hr capsule "       DISCUSSION:  Allan is doing well on current dosage of Adderal, and I am glad to hear he is getting some relief of his post-traumatic headache. Will refill Adderal for 3 months at same dose. He will contact us at that time for 3 additional months of refills.He will return in 6 months for an office visit to discuss medication. He will return or contact me sooner with any concerns.     follow up visit: Refills 3 months, OV 6 months.     Madyson Rincon PA-C  [facility]

## 2020-02-24 NOTE — NURSING NOTE
Allan is here today for a med recheck.    Pre-visit Screening:  Immunizations:  up to date  Colonoscopy:  NA  Mammogram: NA  Asthma Action Test/Plan:  NA  PHQ9:  NA  GAD7:  NA  Questioned patient about current smoking habits Pt. has never smoked.  Ok to leave detailed message on voice mail for today's visit only Yes, phone # [phone]

## 2020-03-02 ENCOUNTER — TELEPHONE (OUTPATIENT)
Dept: SURGERY | Facility: CLINIC | Age: 30
End: 2020-03-02

## 2020-03-02 DIAGNOSIS — G44.309 POST-CONCUSSION HEADACHE: ICD-10-CM

## 2020-03-02 DIAGNOSIS — G47.9 SLEEP DISTURBANCE: ICD-10-CM

## 2020-03-02 RX ORDER — TRAZODONE HYDROCHLORIDE 50 MG/1
50 TABLET, FILM COATED ORAL
Qty: 30 TABLET | Refills: 3 | Status: SHIPPED | OUTPATIENT
Start: 2020-03-02 | End: 2022-09-22

## 2020-03-02 RX ORDER — BUTALBITAL, ACETAMINOPHEN AND CAFFEINE 50; 325; 40 MG/1; MG/1; MG/1
1 TABLET ORAL DAILY PRN
Qty: 14 TABLET | Refills: 0 | Status: ON HOLD | OUTPATIENT
Start: 2020-03-02 | End: 2020-11-12

## 2020-03-02 RX ORDER — BUTALBITAL, ACETAMINOPHEN AND CAFFEINE 50; 325; 40 MG/1; MG/1; MG/1
1 TABLET ORAL DAILY PRN
Qty: 14 TABLET | Refills: 0 | Status: CANCELLED | OUTPATIENT
Start: 2020-03-02

## 2020-03-11 ENCOUNTER — TELEPHONE (OUTPATIENT)
Dept: SURGERY | Facility: CLINIC | Age: 30
End: 2020-03-11

## 2020-03-11 NOTE — TELEPHONE ENCOUNTER
GARRETT to inform patient that Botox injections were approved by Insurance. We will be giving him the injections at his next visit.

## 2020-03-19 ENCOUNTER — MYC REFILL (OUTPATIENT)
Dept: PHYSICAL MEDICINE AND REHAB | Facility: CLINIC | Age: 30
End: 2020-03-19

## 2020-03-19 DIAGNOSIS — G44.309 POST-CONCUSSION HEADACHE: ICD-10-CM

## 2020-03-19 DIAGNOSIS — G47.9 SLEEP DISTURBANCE: ICD-10-CM

## 2020-03-20 RX ORDER — BUTALBITAL, ACETAMINOPHEN AND CAFFEINE 50; 325; 40 MG/1; MG/1; MG/1
1 TABLET ORAL DAILY PRN
Qty: 14 TABLET | Refills: 0 | Status: CANCELLED | OUTPATIENT
Start: 2020-03-20

## 2020-03-20 RX ORDER — TRAZODONE HYDROCHLORIDE 50 MG/1
50 TABLET, FILM COATED ORAL
Qty: 30 TABLET | Refills: 3 | Status: CANCELLED | OUTPATIENT
Start: 2020-03-20

## 2020-04-13 ENCOUNTER — TELEPHONE (OUTPATIENT)
Dept: PHYSICAL MEDICINE AND REHAB | Facility: CLINIC | Age: 30
End: 2020-04-13

## 2020-04-16 ENCOUNTER — TELEPHONE (OUTPATIENT)
Dept: PHYSICAL MEDICINE AND REHAB | Facility: CLINIC | Age: 30
End: 2020-04-16

## 2020-04-22 ENCOUNTER — OFFICE VISIT (OUTPATIENT)
Dept: PHYSICAL MEDICINE AND REHAB | Facility: CLINIC | Age: 30
End: 2020-04-22
Payer: COMMERCIAL

## 2020-04-22 VITALS
HEIGHT: 71 IN | SYSTOLIC BLOOD PRESSURE: 118 MMHG | DIASTOLIC BLOOD PRESSURE: 79 MMHG | HEART RATE: 112 BPM | OXYGEN SATURATION: 100 % | WEIGHT: 205 LBS | BODY MASS INDEX: 28.7 KG/M2

## 2020-04-22 DIAGNOSIS — Z86.69 HX OF MIGRAINES: ICD-10-CM

## 2020-04-22 DIAGNOSIS — G43.019 INTRACTABLE MIGRAINE WITHOUT AURA AND WITHOUT STATUS MIGRAINOSUS: ICD-10-CM

## 2020-04-22 DIAGNOSIS — G44.309 POST-CONCUSSION HEADACHE: Primary | ICD-10-CM

## 2020-04-22 DIAGNOSIS — Z87.820 H/O CONCUSSION: ICD-10-CM

## 2020-04-22 DIAGNOSIS — S16.1XXD CERVICAL MYOFASCIAL STRAIN, SUBSEQUENT ENCOUNTER: ICD-10-CM

## 2020-04-22 ASSESSMENT — PAIN SCALES - GENERAL: PAINLEVEL: MODERATE PAIN (4)

## 2020-04-22 ASSESSMENT — MIFFLIN-ST. JEOR: SCORE: 1927

## 2020-04-22 NOTE — NURSING NOTE
"Chief Complaint   Patient presents with     Head Injury     concussion 12/19/2019- Fall while sowboarding with posterior head strike 12/2019     Botox     migraines cervical dystonia       Vitals:    04/22/20 1358   BP: 118/79   Pulse: 112   SpO2: 100%   Weight: 93 kg (205 lb)   Height: 1.803 m (5' 11\")       Body mass index is 28.59 kg/m .  CONCUSSION SYMPTOMS ASSESSMENT 1/22/2020 2/17/2020 4/22/2020   Headache or Pressure In Head 3 - moderate 2 - mild to moderate 2 - mild to moderate   Upset Stomach or Throwing Up 0 - none 0 - none 0 - none   Problems with Balance 2 - mild to moderate 0 - none 0 - none   Feeling Dizzy 5 - severe 2 - mild to moderate 0 - none   Sensitivity to Light 6 - excruciating 0 - none 1 - mild   Sensitivity to Noise 3 - moderate 2 - mild to moderate 3 - moderate   Mood Changes 3 - moderate 3 - moderate 2 - mild to moderate   Feeling sluggish, hazy, or foggy 2 - mild to moderate 3 - moderate 3 - moderate   Trouble Concentrating, Lack of Focus 4 - moderate to severe 5 - severe 5 - severe   Motion Sickness 2 - mild to moderate 0 - none 0 - none   Vision Changes 0 - none 0 - none 0 - none   Memory Problems 1 - mild 4 - moderate to severe 5 - severe   Feeling Confused 3 - moderate 4 - moderate to severe 4 - moderate to severe   Neck Pain 0 - none 4 - moderate to severe 3 - moderate   Trouble Sleeping 0 - none 4 - moderate to severe 1 - mild   Total Number of Symptoms 11 10 10   Symptom Severity Score 34 33 29                         "
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
## 2020-04-22 NOTE — PATIENT INSTRUCTIONS
"  GENERAL ADVICE:  ~ Gradually ease back into your usual activities.   ~ Rest as needed to help your symptoms go away.  - Consider pairing 50 minutes of activity with 10 minutes of rest.  ~ Allow yourself more time for activities.  ~ Write things down.  At home, at work, whenever there is something that you should remember, even it is simple.  SCREENS:  ~ Change settings on your phone and computer using the \"Blue Light Filter\" (Night Shift on all  Apple products)  ~ The goal is making screens more yellow and less blue.    ~ If this is not an option you can download this program, GenerationStation, to adjust your screen resolution.  ~ Syncplicity for various filter and font apps  ~ Turn screen brightness down  ~ Increase font size  ~ Limit screen activities including computer, TV and phones.  NECK PAIN:  ~ Ice or Heat are good~  ~ Massage is ok if it doesn't trigger more symptoms~  ~ Gentle stretches and range-of-motion are helpful.  DIZZINESS:  ~ No driving when dizzy.  ~ No biking, climbing heights or ladders if dizzy.  FATIGUE:  ~ Daily exercise is strongly encouraged.  Start with a 10 min walk and increase the time as tolerated until you are walking 30 minutes per day.    ~ Focus on Good sleep hygiene instead of napping . Your goal should be 8 hours of sleep every night.  ~ Try Melatonin 1 hour before bed  ANXIETY OR MOOD SWINGS:  ~ If you are irritable or anxious, take a break in a quiet room.  ~ Try using the free Calm johnny for guided breathing and mindfulness/meditation.  ~ Explore MarkMonitor (https://www.headspace.com) for free and easy-to-use meditation guidance.  LIGHT SENSITIVITIES:  ~ Avoid florescent lighting when possible.  ~Yellow or kyra tinted lenses may help reduce computer or night-time road glare.             ~ Amazon has a $10.00 option: Besgoods yellow Night Vision.  NOISE SENSITIVITIES:  ~ Try listening to calming sounds such as the \"Calm Johnny\" to help shift your focus off of more irritating " sounds.  ~ Avoid crowded areas at first then slowly introduce yourself to small increments of crowded, noisy areas.  ~ Try High fidelity earplugs used by Musicians. Etymotic ETY-Plugs, can be found on Amazon for $13.00.  DIET:  - In principle incorporate more protein, lots of veggies, some fruit, whole grains.    - Less sweets and saturated fat.   - Mediterranean Diet is an easy-to-follow example.  ~ Drink plenty of water throughout the day (8-10 glasses per day)    PM&R / M [facility]   Nurse Line # [phone]   Fax # [phone]  Scheduling; # [phone]  E-MAIL - [email or web address]    Thank you for allowing us to be a part of your care.
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
## 2020-05-21 ENCOUNTER — MYC REFILL (OUTPATIENT)
Dept: FAMILY MEDICINE | Facility: CLINIC | Age: 30
End: 2020-05-21

## 2020-05-21 DIAGNOSIS — F90.0 ADHD (ATTENTION DEFICIT HYPERACTIVITY DISORDER), INATTENTIVE TYPE: ICD-10-CM

## 2020-05-21 RX ORDER — DEXTROAMPHETAMINE SACCHARATE, AMPHETAMINE ASPARTATE MONOHYDRATE, DEXTROAMPHETAMINE SULFATE AND AMPHETAMINE SULFATE 7.5; 7.5; 7.5; 7.5 MG/1; MG/1; MG/1; MG/1
30 CAPSULE, EXTENDED RELEASE ORAL 2 TIMES DAILY
Qty: 60 CAPSULE | Refills: 0 | Status: CANCELLED | OUTPATIENT
Start: 2020-05-21

## 2020-05-21 RX ORDER — DEXTROAMPHETAMINE SACCHARATE, AMPHETAMINE ASPARTATE MONOHYDRATE, DEXTROAMPHETAMINE SULFATE AND AMPHETAMINE SULFATE 7.5; 7.5; 7.5; 7.5 MG/1; MG/1; MG/1; MG/1
30 CAPSULE, EXTENDED RELEASE ORAL 2 TIMES DAILY
Qty: 60 CAPSULE | Refills: 0 | Status: SHIPPED | OUTPATIENT
Start: 2020-06-21 | End: 2020-08-18

## 2020-05-21 RX ORDER — DEXTROAMPHETAMINE SACCHARATE, AMPHETAMINE ASPARTATE MONOHYDRATE, DEXTROAMPHETAMINE SULFATE AND AMPHETAMINE SULFATE 7.5; 7.5; 7.5; 7.5 MG/1; MG/1; MG/1; MG/1
30 CAPSULE, EXTENDED RELEASE ORAL 2 TIMES DAILY
Qty: 60 CAPSULE | Refills: 0 | Status: SHIPPED | OUTPATIENT
Start: 2020-07-22 | End: 2020-08-21

## 2020-05-21 RX ORDER — DEXTROAMPHETAMINE SACCHARATE, AMPHETAMINE ASPARTATE MONOHYDRATE, DEXTROAMPHETAMINE SULFATE AND AMPHETAMINE SULFATE 7.5; 7.5; 7.5; 7.5 MG/1; MG/1; MG/1; MG/1
30 CAPSULE, EXTENDED RELEASE ORAL 2 TIMES DAILY
Qty: 60 CAPSULE | Refills: 0 | Status: SHIPPED | OUTPATIENT
Start: 2020-05-21 | End: 2020-08-18

## 2020-05-21 NOTE — TELEPHONE ENCOUNTER
Allan Leon is requesting a refill of:    Pending Prescriptions:                       Disp   Refills    amphetamine-dextroamphetamine (ADDERALL X*60 cap*0            Sig: Take 1 capsule (30 mg) by mouth 2 times daily    amphetamine-dextroamphetamine (ADDERALL X*60 cap*0            Sig: Take 1 capsule (30 mg) by mouth 2 times daily    amphetamine-dextroamphetamine (ADDERALL X*60 cap*0            Sig: Take 1 capsule (30 mg) by mouth 2 times daily    Pt has controled substance letter  Last OV was 2/24/20

## 2020-06-26 ENCOUNTER — MYC REFILL (OUTPATIENT)
Dept: FAMILY MEDICINE | Facility: CLINIC | Age: 30
End: 2020-06-26

## 2020-06-26 DIAGNOSIS — N52.9 ERECTILE DYSFUNCTION, UNSPECIFIED ERECTILE DYSFUNCTION TYPE: ICD-10-CM

## 2020-06-26 RX ORDER — SILDENAFIL 25 MG/1
TABLET, FILM COATED ORAL
Qty: 30 TABLET | Refills: 0 | Status: SHIPPED | OUTPATIENT
Start: 2020-06-26 | End: 2020-08-18 | Stop reason: DRUGHIGH

## 2020-06-26 NOTE — TELEPHONE ENCOUNTER
Allan Leon is requesting a refill of:    Pending Prescriptions:                       Disp   Refills    sildenafil (VIAGRA) 25 MG tablet          30 tab*0            Sig: Take 0.5-1 tablets by mouth 1 hour prior to           intercourse. Max dose: 2 tabs/24 hours. Do not           take with doxazosin, tamsulosin, nitroglycerin.    Please close encounter if RX was sent. Thanks, Naomy

## 2020-07-10 ENCOUNTER — MYC REFILL (OUTPATIENT)
Dept: FAMILY MEDICINE | Facility: CLINIC | Age: 30
End: 2020-07-10

## 2020-07-10 DIAGNOSIS — N52.9 ERECTILE DYSFUNCTION, UNSPECIFIED ERECTILE DYSFUNCTION TYPE: ICD-10-CM

## 2020-07-10 RX ORDER — SILDENAFIL 50 MG/1
TABLET, FILM COATED ORAL
Qty: 10 TABLET | Refills: 0 | Status: SHIPPED | OUTPATIENT
Start: 2020-07-10 | End: 2020-07-13

## 2020-07-10 RX ORDER — SILDENAFIL 25 MG/1
TABLET, FILM COATED ORAL
Qty: 30 TABLET | Refills: 0 | Status: CANCELLED | OUTPATIENT
Start: 2020-07-10

## 2020-07-10 NOTE — TELEPHONE ENCOUNTER
Please remind pt this is not a nightly medication, only use up to 2-3 times weekly. I will authorize 10 tablets of 50 mg of viagra to take 1-2 tablets at night. If this is not working, will refer to urology. Please inform.

## 2020-07-10 NOTE — TELEPHONE ENCOUNTER
Patient is requesting a refill of  Pending Prescriptions:                       Disp   Refills    sildenafil (VIAGRA) 25 MG tablet          30 tab*0            Sig: Take 0.5-1 tablets by mouth 1 hour prior to           intercourse. Max dose: 2 tabs/24 hours. Do not           take with doxazosin, tamsulosin, nitroglycerin.    Patient last had a refill of this medication on 6/26/2020. Zerve message was sent to the patient informing him of this.

## 2020-07-10 NOTE — TELEPHONE ENCOUNTER
Routing to Madyson for review, are you wanting to send in another refill of this? Patient is taking 2 of them every night.

## 2020-07-13 ENCOUNTER — OFFICE VISIT (OUTPATIENT)
Dept: PHYSICAL MEDICINE AND REHAB | Facility: CLINIC | Age: 30
End: 2020-07-13
Payer: COMMERCIAL

## 2020-07-13 VITALS
HEIGHT: 71 IN | WEIGHT: 210 LBS | BODY MASS INDEX: 29.4 KG/M2 | SYSTOLIC BLOOD PRESSURE: 125 MMHG | DIASTOLIC BLOOD PRESSURE: 78 MMHG | HEART RATE: 97 BPM

## 2020-07-13 DIAGNOSIS — S06.0X0D CONCUSSION WITHOUT LOSS OF CONSCIOUSNESS, SUBSEQUENT ENCOUNTER: Primary | ICD-10-CM

## 2020-07-13 DIAGNOSIS — G47.9 SLEEP DISTURBANCE: ICD-10-CM

## 2020-07-13 DIAGNOSIS — Z86.69 HX OF MIGRAINES: ICD-10-CM

## 2020-07-13 DIAGNOSIS — G43.009 MIGRAINE WITHOUT AURA AND WITHOUT STATUS MIGRAINOSUS, NOT INTRACTABLE: ICD-10-CM

## 2020-07-13 DIAGNOSIS — R41.840 ATTENTION AND CONCENTRATION DEFICIT: ICD-10-CM

## 2020-07-13 DIAGNOSIS — Z87.820 H/O CONCUSSION: ICD-10-CM

## 2020-07-13 ASSESSMENT — MIFFLIN-ST. JEOR: SCORE: 1944.68

## 2020-07-13 NOTE — NURSING NOTE
"Chief Complaint   Patient presents with     Head Injury      concussion 12/19/2019- Fall while sowboarding with posterior head strike 12/2019     Headache     Botox injections       Vitals:    07/13/20 1346 07/13/20 1347   BP: 125/78    Pulse: 97    Weight:  95.3 kg (210 lb)   Height:  1.803 m (5' 11\")       Body mass index is 29.29 kg/m .      CONCUSSION SYMPTOMS ASSESSMENT 2/17/2020 4/22/2020 7/13/2020   Headache or Pressure In Head 2 - mild to moderate 2 - mild to moderate 2 - mild to moderate   Upset Stomach or Throwing Up 0 - none 0 - none 0 - none   Problems with Balance 0 - none 0 - none 0 - none   Feeling Dizzy 2 - mild to moderate 0 - none 0 - none   Sensitivity to Light 0 - none 1 - mild 0 - none   Sensitivity to Noise 2 - mild to moderate 3 - moderate 0 - none   Mood Changes 3 - moderate 2 - mild to moderate 2 - mild to moderate   Feeling sluggish, hazy, or foggy 3 - moderate 3 - moderate 1 - mild   Trouble Concentrating, Lack of Focus 5 - severe 5 - severe 1 - mild   Motion Sickness 0 - none 0 - none 0 - none   Vision Changes 0 - none 0 - none 0 - none   Memory Problems 4 - moderate to severe 5 - severe 2 - mild to moderate   Feeling Confused 4 - moderate to severe 4 - moderate to severe 1 - mild   Neck Pain 4 - moderate to severe 3 - moderate 2 - mild to moderate   Trouble Sleeping 4 - moderate to severe 1 - mild 1 - mild   Total Number of Symptoms 10 10 8   Symptom Severity Score 33 29 12                         "
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
## 2020-07-22 DIAGNOSIS — N52.9 ERECTILE DYSFUNCTION, UNSPECIFIED ERECTILE DYSFUNCTION TYPE: Primary | ICD-10-CM

## 2020-07-22 RX ORDER — SILDENAFIL 50 MG/1
50 TABLET, FILM COATED ORAL DAILY PRN
Qty: 30 TABLET | Refills: 2 | Status: SHIPPED | OUTPATIENT
Start: 2020-07-22 | End: 2020-10-16

## 2020-07-22 NOTE — TELEPHONE ENCOUNTER
Pt called asking for a refill of Sildenafil. He was previously prescribed 25MG and SRB told him he can take 2. He has been taking 2 and running out quickly. He would like a refill of 50MG instead of taking 2 tablets.    Allan Leon is requesting a refill of:    Pending Prescriptions:                       Disp   Refills    sildenafil (VIAGRA) 50 MG tablet          30 tab*2            Sig: Take 1 tablet (50 mg) by mouth daily as needed           (for ED)

## 2020-08-18 ENCOUNTER — OFFICE VISIT (OUTPATIENT)
Dept: FAMILY MEDICINE | Facility: CLINIC | Age: 30
End: 2020-08-18

## 2020-08-18 ENCOUNTER — MYC REFILL (OUTPATIENT)
Dept: FAMILY MEDICINE | Facility: CLINIC | Age: 30
End: 2020-08-18

## 2020-08-18 VITALS
TEMPERATURE: 98.4 F | DIASTOLIC BLOOD PRESSURE: 78 MMHG | SYSTOLIC BLOOD PRESSURE: 110 MMHG | OXYGEN SATURATION: 99 % | BODY MASS INDEX: 28.14 KG/M2 | HEART RATE: 91 BPM | WEIGHT: 201 LBS | HEIGHT: 71 IN

## 2020-08-18 DIAGNOSIS — N52.9 ERECTILE DYSFUNCTION, UNSPECIFIED ERECTILE DYSFUNCTION TYPE: ICD-10-CM

## 2020-08-18 DIAGNOSIS — F90.0 ADHD (ATTENTION DEFICIT HYPERACTIVITY DISORDER), INATTENTIVE TYPE: Primary | ICD-10-CM

## 2020-08-18 DIAGNOSIS — G44.309 POST-CONCUSSION HEADACHE: ICD-10-CM

## 2020-08-18 DIAGNOSIS — F90.0 ADHD (ATTENTION DEFICIT HYPERACTIVITY DISORDER), INATTENTIVE TYPE: ICD-10-CM

## 2020-08-18 PROCEDURE — 99213 OFFICE O/P EST LOW 20 MIN: CPT | Performed by: PHYSICIAN ASSISTANT

## 2020-08-18 RX ORDER — DEXTROAMPHETAMINE SACCHARATE, AMPHETAMINE ASPARTATE MONOHYDRATE, DEXTROAMPHETAMINE SULFATE AND AMPHETAMINE SULFATE 7.5; 7.5; 7.5; 7.5 MG/1; MG/1; MG/1; MG/1
30 CAPSULE, EXTENDED RELEASE ORAL 2 TIMES DAILY
Qty: 60 CAPSULE | Refills: 0 | Status: CANCELLED | OUTPATIENT
Start: 2020-08-18

## 2020-08-18 RX ORDER — DEXTROAMPHETAMINE SACCHARATE, AMPHETAMINE ASPARTATE MONOHYDRATE, DEXTROAMPHETAMINE SULFATE AND AMPHETAMINE SULFATE 7.5; 7.5; 7.5; 7.5 MG/1; MG/1; MG/1; MG/1
30 CAPSULE, EXTENDED RELEASE ORAL 2 TIMES DAILY
Qty: 60 CAPSULE | Refills: 0 | Status: SHIPPED | OUTPATIENT
Start: 2020-09-18 | End: 2020-10-18

## 2020-08-18 RX ORDER — BUTALBITAL, ACETAMINOPHEN AND CAFFEINE 50; 325; 40 MG/1; MG/1; MG/1
1 TABLET ORAL DAILY PRN
Qty: 14 TABLET | Refills: 0 | Status: CANCELLED | OUTPATIENT
Start: 2020-08-18

## 2020-08-18 RX ORDER — SILDENAFIL 50 MG/1
50 TABLET, FILM COATED ORAL DAILY PRN
Qty: 30 TABLET | Refills: 2 | Status: CANCELLED | OUTPATIENT
Start: 2020-08-18

## 2020-08-18 RX ORDER — DEXTROAMPHETAMINE SACCHARATE, AMPHETAMINE ASPARTATE MONOHYDRATE, DEXTROAMPHETAMINE SULFATE AND AMPHETAMINE SULFATE 7.5; 7.5; 7.5; 7.5 MG/1; MG/1; MG/1; MG/1
30 CAPSULE, EXTENDED RELEASE ORAL 2 TIMES DAILY
Qty: 60 CAPSULE | Refills: 0 | Status: SHIPPED | OUTPATIENT
Start: 2020-08-19 | End: 2020-11-11

## 2020-08-18 RX ORDER — DEXTROAMPHETAMINE SACCHARATE, AMPHETAMINE ASPARTATE MONOHYDRATE, DEXTROAMPHETAMINE SULFATE AND AMPHETAMINE SULFATE 7.5; 7.5; 7.5; 7.5 MG/1; MG/1; MG/1; MG/1
30 CAPSULE, EXTENDED RELEASE ORAL 2 TIMES DAILY
Qty: 60 CAPSULE | Refills: 0 | Status: SHIPPED | OUTPATIENT
Start: 2020-10-19 | End: 2020-11-11

## 2020-08-18 ASSESSMENT — MIFFLIN-ST. JEOR: SCORE: 1903.86

## 2020-08-18 NOTE — PROGRESS NOTES
"CC: Medication Check    History:  ADHD:  Taking Adderall XR 30 mg twice daily one in morning and one in afternoon. He did take this medication today. No side effects from this including racing heart, sweats, worsening headache, insomnia, sexual side effects. Feels like it is working well to control focus, and he was able to graduate school with bachelor's. Can tell he can't focus well when he stops taking. 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     Headaches:  Discussed HAs at previous visits, and since that time got Bachelor's degree in psychology as well as has gotten botox injection that they are doing approximately every 3 months. Taking trazodone at night for sleep.     ED:  Takes viagra 50 mg as needed. He feels this works well.     PMH, MEDICATIONS, ALLERGIES, SOCIAL AND FAMILY HISTORY in [facility] and reviewed by me personally.    ROS negative other than the symptoms noted above in the HPI.    Examination   /78 (BP Location: Left arm, Patient Position: Sitting, Cuff Size: Adult Large)   Pulse 91   Temp 98.4  F (36.9  C) (Oral)   Ht 1.803 m (5' 11\")   Wt 91.2 kg (201 lb)   SpO2 99%   BMI 28.03 kg/m       Constitutional: Sitting comfortably, in no acute distress. Vital signs noted  Eyes: pupils equal round reactive to light and accomodation, extra ocular movements intact  Neck:  no adenopathy, trachea midline and normal to palpation, thyroid normal to palpation  Cardiovascular:  regular rate and rhythm, no murmurs, clicks, or gallops  Respiratory:  normal respiratory rate and rhythm, lungs clear to auscultation  SKIN: No jaundice/pallor/rash.   Psychiatric: mentation appears normal and affect normal/bright      A/P    ICD-10-CM    1. Post-concussion headache  G44.309    2. ADHD (attention deficit hyperactivity disorder), inattentive type  F90.0 amphetamine-dextroamphetamine (ADDERALL XR) 30 MG 24 hr capsule     " amphetamine-dextroamphetamine (ADDERALL XR) 30 MG 24 hr capsule     amphetamine-dextroamphetamine (ADDERALL XR) 30 MG 24 hr capsule   3. Erectile dysfunction, unspecified erectile dysfunction type  N52.9        DISCUSSION:  ADHD:  Allan is doing well on current dosage. Will refill for 3 months at same dose. He will contact us at that time for 3 additional months of refills. He will return in 6 months for an office visit to discuss medication. He will return or contact me sooner with any concerns.     ED:  Agreed to refill as needed, but encouraged him to use it sparingly. He feels very strongly that he needs this medication.     follow up visit: 6 months, refills at 3 months    Madyson Rincon PA-C  [facility]

## 2020-08-18 NOTE — NURSING NOTE
Allan is here for an adhd med check.        Pre-visit Screening:  Immunizations:  up to date  Colonoscopy:  NA  Mammogram: NA  Asthma Action Test/Plan:  NA  PHQ9:  None  GAD7:  None  Questioned patient about current smoking habits Pt. has never smoked.  Ok to leave detailed message on voice mail for today's visit only Yes, phone # cell

## 2020-09-10 DIAGNOSIS — F90.0 ADHD (ATTENTION DEFICIT HYPERACTIVITY DISORDER), INATTENTIVE TYPE: Primary | ICD-10-CM

## 2020-09-10 NOTE — TELEPHONE ENCOUNTER
Pt would like to switch pharmacies due to insurance. He has 2 scripts remaining at the pharmacy that will need to be resent to new pharmacy.    Allan Leon is requesting a refill of:    Pending Prescriptions:                       Disp   Refills    amphetamine-dextroamphetamine (ADDERALL X*60 cap*0            Sig: Take 1 capsule (30 mg) by mouth 2 times daily    amphetamine-dextroamphetamine (ADDERALL X*60 cap*0            Sig: Take 1 capsule (30 mg) by mouth 2 times daily

## 2020-09-11 RX ORDER — DEXTROAMPHETAMINE SACCHARATE, AMPHETAMINE ASPARTATE MONOHYDRATE, DEXTROAMPHETAMINE SULFATE AND AMPHETAMINE SULFATE 7.5; 7.5; 7.5; 7.5 MG/1; MG/1; MG/1; MG/1
30 CAPSULE, EXTENDED RELEASE ORAL 2 TIMES DAILY
Qty: 60 CAPSULE | Refills: 0 | Status: SHIPPED | OUTPATIENT
Start: 2020-10-19 | End: 2020-11-11

## 2020-09-11 RX ORDER — DEXTROAMPHETAMINE SACCHARATE, AMPHETAMINE ASPARTATE MONOHYDRATE, DEXTROAMPHETAMINE SULFATE AND AMPHETAMINE SULFATE 7.5; 7.5; 7.5; 7.5 MG/1; MG/1; MG/1; MG/1
30 CAPSULE, EXTENDED RELEASE ORAL 2 TIMES DAILY
Qty: 60 CAPSULE | Refills: 0 | Status: SHIPPED | OUTPATIENT
Start: 2020-09-18 | End: 2020-11-18

## 2020-09-23 ENCOUNTER — MYC MEDICAL ADVICE (OUTPATIENT)
Dept: FAMILY MEDICINE | Facility: CLINIC | Age: 30
End: 2020-09-23

## 2020-09-23 DIAGNOSIS — N52.9 ERECTILE DYSFUNCTION, UNSPECIFIED ERECTILE DYSFUNCTION TYPE: ICD-10-CM

## 2020-09-23 RX ORDER — SILDENAFIL 50 MG/1
50 TABLET, FILM COATED ORAL DAILY PRN
Qty: 30 TABLET | Refills: 2 | OUTPATIENT
Start: 2020-09-23

## 2020-09-23 NOTE — TELEPHONE ENCOUNTER
Pt last seen 08/18/20. Last refilled 07/22/20 pharmacy called he is out. Seems very early to refill. Please advise.    Allan Leon is requesting a refill of:    Pending Prescriptions:                       Disp   Refills    sildenafil (VIAGRA) 50 MG tablet          30 tab*2            Sig: Take 1 tablet (50 mg) by mouth daily as needed           (for ED)

## 2020-10-15 ENCOUNTER — MYC REFILL (OUTPATIENT)
Dept: FAMILY MEDICINE | Facility: CLINIC | Age: 30
End: 2020-10-15

## 2020-10-15 DIAGNOSIS — N52.9 ERECTILE DYSFUNCTION, UNSPECIFIED ERECTILE DYSFUNCTION TYPE: ICD-10-CM

## 2020-10-15 PROBLEM — R10.13 EPIGASTRIC PAIN: Status: ACTIVE | Noted: 2020-10-15

## 2020-10-15 PROBLEM — R44.3 HALLUCINATIONS: Status: ACTIVE | Noted: 2020-10-15

## 2020-10-15 RX ORDER — SILDENAFIL 50 MG/1
50 TABLET, FILM COATED ORAL DAILY PRN
Qty: 30 TABLET | Refills: 2 | Status: CANCELLED | OUTPATIENT
Start: 2020-10-15

## 2020-10-15 NOTE — TELEPHONE ENCOUNTER
Routing to Madyson for review, could you inform the patient that this is not something at his age that he should be needing to take daily? I am not sure what to say to him.

## 2020-10-16 RX ORDER — SILDENAFIL 50 MG/1
50 TABLET, FILM COATED ORAL DAILY PRN
Qty: 30 TABLET | Refills: 0 | Status: SHIPPED | OUTPATIENT
Start: 2020-10-16 | End: 2021-09-01

## 2020-11-11 ENCOUNTER — HOSPITAL ENCOUNTER (INPATIENT)
Facility: CLINIC | Age: 30
LOS: 2 days | Discharge: HOME | End: 2020-11-13
Attending: PSYCHIATRY & NEUROLOGY | Admitting: PSYCHIATRY & NEUROLOGY
Payer: COMMERCIAL

## 2020-11-11 ENCOUNTER — TELEPHONE (OUTPATIENT)
Dept: BEHAVIORAL HEALTH | Facility: CLINIC | Age: 30
End: 2020-11-11

## 2020-11-11 ENCOUNTER — HOSPITAL ENCOUNTER (EMERGENCY)
Facility: CLINIC | Age: 30
Discharge: ANOTHER HOSPITAL | End: 2020-11-11
Attending: EMERGENCY MEDICINE | Admitting: EMERGENCY MEDICINE
Payer: COMMERCIAL

## 2020-11-11 VITALS
SYSTOLIC BLOOD PRESSURE: 126 MMHG | OXYGEN SATURATION: 98 % | DIASTOLIC BLOOD PRESSURE: 76 MMHG | TEMPERATURE: 96.9 F | HEART RATE: 78 BPM | RESPIRATION RATE: 16 BRPM

## 2020-11-11 DIAGNOSIS — R45.851 SUICIDAL IDEATION: ICD-10-CM

## 2020-11-11 LAB
AMPHETAMINES UR QL SCN: POSITIVE
BARBITURATES UR QL: NEGATIVE
BENZODIAZ UR QL: NEGATIVE
CANNABINOIDS UR QL SCN: NEGATIVE
COCAINE UR QL: NEGATIVE
ETHANOL SERPL-MCNC: <0.01 G/DL
OPIATES UR QL SCN: NEGATIVE
PCP UR QL SCN: NEGATIVE
SARS-COV-2 RNA SPEC QL NAA+PROBE: NORMAL
SPECIMEN SOURCE: NORMAL

## 2020-11-11 PROCEDURE — 80307 DRUG TEST PRSMV CHEM ANLYZR: CPT | Performed by: EMERGENCY MEDICINE

## 2020-11-11 PROCEDURE — 90791 PSYCH DIAGNOSTIC EVALUATION: CPT

## 2020-11-11 PROCEDURE — 124N000002 HC R&B MH UMMC

## 2020-11-11 PROCEDURE — U0003 INFECTIOUS AGENT DETECTION BY NUCLEIC ACID (DNA OR RNA); SEVERE ACUTE RESPIRATORY SYNDROME CORONAVIRUS 2 (SARS-COV-2) (CORONAVIRUS DISEASE [COVID-19]), AMPLIFIED PROBE TECHNIQUE, MAKING USE OF HIGH THROUGHPUT TECHNOLOGIES AS DESCRIBED BY CMS-2020-01-R: HCPCS | Performed by: EMERGENCY MEDICINE

## 2020-11-11 PROCEDURE — C9803 HOPD COVID-19 SPEC COLLECT: HCPCS

## 2020-11-11 PROCEDURE — 80320 DRUG SCREEN QUANTALCOHOLS: CPT | Performed by: EMERGENCY MEDICINE

## 2020-11-11 PROCEDURE — 36415 COLL VENOUS BLD VENIPUNCTURE: CPT | Performed by: EMERGENCY MEDICINE

## 2020-11-11 PROCEDURE — HZ2ZZZZ DETOXIFICATION SERVICES FOR SUBSTANCE ABUSE TREATMENT: ICD-10-PCS | Performed by: PSYCHIATRY & NEUROLOGY

## 2020-11-11 PROCEDURE — 99285 EMERGENCY DEPT VISIT HI MDM: CPT | Mod: 25

## 2020-11-11 ASSESSMENT — ACTIVITIES OF DAILY LIVING (ADL)
WALKING_OR_CLIMBING_STAIRS_DIFFICULTY: NO
TOILETING_ISSUES: NO
DOING_ERRANDS_INDEPENDENTLY_DIFFICULTY: NO
FALL_HISTORY_WITHIN_LAST_SIX_MONTHS: NO
DIFFICULTY_EATING/SWALLOWING: NO
DRESSING/BATHING_DIFFICULTY: NO

## 2020-11-11 ASSESSMENT — MIFFLIN-ST. JEOR: SCORE: 1904.77
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
## 2020-11-12 PROBLEM — R45.851 SUICIDAL IDEATION: Status: ACTIVE | Noted: 2020-11-12

## 2020-11-12 LAB
ALBUMIN SERPL-MCNC: 3.5 G/DL (ref 3.4–5)
ALP SERPL-CCNC: 39 U/L (ref 40–150)
ALT SERPL W P-5'-P-CCNC: 86 U/L (ref 0–70)
ANION GAP SERPL CALCULATED.3IONS-SCNC: 4 MMOL/L (ref 3–14)
AST SERPL W P-5'-P-CCNC: 52 U/L (ref 0–45)
BILIRUB SERPL-MCNC: 1.3 MG/DL (ref 0.2–1.3)
BUN SERPL-MCNC: 10 MG/DL (ref 7–30)
CALCIUM SERPL-MCNC: 8.2 MG/DL (ref 8.5–10.1)
CHLORIDE SERPL-SCNC: 109 MMOL/L (ref 94–109)
CO2 SERPL-SCNC: 28 MMOL/L (ref 20–32)
CREAT SERPL-MCNC: 0.93 MG/DL (ref 0.66–1.25)
DEPRECATED CALCIDIOL+CALCIFEROL SERPL-MC: 22 UG/L (ref 20–75)
ERYTHROCYTE [DISTWIDTH] IN BLOOD BY AUTOMATED COUNT: 12.4 % (ref 10–15)
GFR SERPL CREATININE-BSD FRML MDRD: >90 ML/MIN/{1.73_M2}
GLUCOSE SERPL-MCNC: 86 MG/DL (ref 70–99)
HCT VFR BLD AUTO: 43.4 % (ref 40–53)
HGB BLD-MCNC: 14.8 G/DL (ref 13.3–17.7)
LABORATORY COMMENT REPORT: NORMAL
MCH RBC QN AUTO: 31.2 PG (ref 26.5–33)
MCHC RBC AUTO-ENTMCNC: 34.1 G/DL (ref 31.5–36.5)
MCV RBC AUTO: 91 FL (ref 78–100)
PLATELET # BLD AUTO: 222 10E9/L (ref 150–450)
POTASSIUM SERPL-SCNC: 3.8 MMOL/L (ref 3.4–5.3)
PROT SERPL-MCNC: 6.3 G/DL (ref 6.8–8.8)
RBC # BLD AUTO: 4.75 10E12/L (ref 4.4–5.9)
SARS-COV-2 RNA SPEC QL NAA+PROBE: NEGATIVE
SODIUM SERPL-SCNC: 141 MMOL/L (ref 133–144)
SPECIMEN SOURCE: NORMAL
TSH SERPL DL<=0.005 MIU/L-ACNC: 0.69 MU/L (ref 0.4–4)
WBC # BLD AUTO: 6.6 10E9/L (ref 4–11)

## 2020-11-12 PROCEDURE — 85027 COMPLETE CBC AUTOMATED: CPT | Performed by: STUDENT IN AN ORGANIZED HEALTH CARE EDUCATION/TRAINING PROGRAM

## 2020-11-12 PROCEDURE — 36415 COLL VENOUS BLD VENIPUNCTURE: CPT | Performed by: STUDENT IN AN ORGANIZED HEALTH CARE EDUCATION/TRAINING PROGRAM

## 2020-11-12 PROCEDURE — 84443 ASSAY THYROID STIM HORMONE: CPT | Performed by: STUDENT IN AN ORGANIZED HEALTH CARE EDUCATION/TRAINING PROGRAM

## 2020-11-12 PROCEDURE — 99223 1ST HOSP IP/OBS HIGH 75: CPT | Mod: AI | Performed by: PSYCHIATRY & NEUROLOGY

## 2020-11-12 PROCEDURE — 124N000002 HC R&B MH UMMC

## 2020-11-12 PROCEDURE — 80053 COMPREHEN METABOLIC PANEL: CPT | Performed by: STUDENT IN AN ORGANIZED HEALTH CARE EDUCATION/TRAINING PROGRAM

## 2020-11-12 PROCEDURE — 82306 VITAMIN D 25 HYDROXY: CPT | Performed by: STUDENT IN AN ORGANIZED HEALTH CARE EDUCATION/TRAINING PROGRAM

## 2020-11-12 RX ORDER — TRAZODONE HYDROCHLORIDE 50 MG/1
50 TABLET, FILM COATED ORAL
Status: DISCONTINUED | OUTPATIENT
Start: 2020-11-12 | End: 2020-11-13 | Stop reason: HOSPADM

## 2020-11-12 RX ORDER — LANOLIN ALCOHOL/MO/W.PET/CERES
3 CREAM (GRAM) TOPICAL
Status: DISCONTINUED | OUTPATIENT
Start: 2020-11-12 | End: 2020-11-13 | Stop reason: HOSPADM

## 2020-11-12 RX ORDER — OLANZAPINE 10 MG/1
10 TABLET ORAL 3 TIMES DAILY PRN
Status: DISCONTINUED | OUTPATIENT
Start: 2020-11-12 | End: 2020-11-13 | Stop reason: HOSPADM

## 2020-11-12 RX ORDER — MAGNESIUM HYDROXIDE/ALUMINUM HYDROXICE/SIMETHICONE 120; 1200; 1200 MG/30ML; MG/30ML; MG/30ML
30 SUSPENSION ORAL EVERY 4 HOURS PRN
Status: DISCONTINUED | OUTPATIENT
Start: 2020-11-12 | End: 2020-11-13 | Stop reason: HOSPADM

## 2020-11-12 RX ORDER — HYDROXYZINE HYDROCHLORIDE 25 MG/1
25 TABLET, FILM COATED ORAL EVERY 4 HOURS PRN
Status: DISCONTINUED | OUTPATIENT
Start: 2020-11-12 | End: 2020-11-13 | Stop reason: HOSPADM

## 2020-11-12 RX ORDER — OLANZAPINE 10 MG/2ML
10 INJECTION, POWDER, FOR SOLUTION INTRAMUSCULAR 3 TIMES DAILY PRN
Status: DISCONTINUED | OUTPATIENT
Start: 2020-11-12 | End: 2020-11-13 | Stop reason: HOSPADM

## 2020-11-12 RX ORDER — ACETAMINOPHEN 325 MG/1
650 TABLET ORAL EVERY 4 HOURS PRN
Status: DISCONTINUED | OUTPATIENT
Start: 2020-11-12 | End: 2020-11-13 | Stop reason: HOSPADM

## 2020-11-12 RX ORDER — DIAZEPAM 5 MG
5-20 TABLET ORAL EVERY 30 MIN PRN
Status: DISCONTINUED | OUTPATIENT
Start: 2020-11-12 | End: 2020-11-13 | Stop reason: HOSPADM

## 2020-11-12 ASSESSMENT — ACTIVITIES OF DAILY LIVING (ADL)
HYGIENE/GROOMING: INDEPENDENT
HYGIENE/GROOMING: INDEPENDENT
DRESS: INDEPENDENT
LAUNDRY: WITH SUPERVISION
LAUNDRY: WITH SUPERVISION
ORAL_HYGIENE: INDEPENDENT
DRESS: SCRUBS (BEHAVIORAL HEALTH);INDEPENDENT
ORAL_HYGIENE: INDEPENDENT

## 2020-11-12 ASSESSMENT — MIFFLIN-ST. JEOR: SCORE: 1907.04

## 2020-11-12 NOTE — PLAN OF CARE
Problem: Behavioral Health Plan of Care  Goal: Patient-Specific Goal (Individualization)  Flowsheets (Taken 11/12/2020 1226)  Patient Vulnerabilities:   lacks insight into illness   occupational insecurity   poor impulse control   family/relationship conflict   housing insecurity   substance abuse/addiction  Patient Personal Strengths:   motivated for recovery   no history of violence   realistic evaluation of current/future capabilities  Note:   Personal Plan of Care    Patient goals:  Feel stable  Get referrals for outpatient care    Plan for admission:  1. stabilization of mood disorder symptoms.   2. Absence of SI/Safe with self  3. Medication management per Mds  4. Coordination of Care with outpatient providers/family  5. Psychiatric follow up care in place    For CD:    Monitor for symptoms of withdrawal  Complete CD assessment/plan for sobriety

## 2020-11-12 NOTE — CONSULTS
11/12/2020    CD consult acknowledged.   Please provide pt with CD paperwork to complete (in pen or marker please).   Once completed, it can be sent to:  [email or web address]  [email or web address]    Depending if pt gets paperwork completed, he might be able to be seen tomorrow. Otherwise likely seen early next week.    [email or web address]  [phone]

## 2020-11-12 NOTE — PLAN OF CARE
"Admission Notes:    Admitted in [facility] this 30 year old male patient from [facility] ED with the chief complaint of worsening SI with a plan to shoot himself with his friend's gun. Patient states that \"If I have a gun, I could have shoot myself\". Patient denies owning a gun. Patient reports that for the past 2 weeks his SI has escalated. He reports that the main stressor is him and his girlfriend have been fighting. He became so angry that he tried to jump out of a moving vehicle but the doors were locked. He got out from the vehicle as it slowed down. Patient also reports that his other stressors are: \"Not succeeding even if I graduated but not doing anything\" and financial problem. Patient states that he got a new job ([employer] job) but has not started yet.  Patient reports that he uses alcohol everyday using 1/2 li/day of whisky and his last use was on 11/10/20 at 9:00 p.m. Patient is not actively withdrawing from alcohol. He denies Hx of detox. Patient has Hx of ADHD and is taking Adderall 60 mgs a day however he says that he tends to take up to 100 mgs/day. He buys the meds from other people when he runs out of the said med. Patient reportedly has been gambling and was hooked on this habit to the point of selling his parents' belongings and his belongings at the [employer] shop. Patient was kicked out of his parents' home and is now temporarily living with a friend.    Currently, patient denies SI/SIB nor hallucinations. He denies racing thoughts. No psychotic symptoms observed nor reported. He denies Hx of SA. Patint is pleasant and cooperative. He was made comfortable in bed. Patient is admitted voluntarily.    His MSSA score is 2. He slept for 6 hours.  "
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
## 2020-11-12 NOTE — PROGRESS NOTES
11/12/20 0047   Patient Belongings   Did you bring any home meds/supplements to the hospital?  No   Patient Belongings locker   Patient Belongings Put in Hospital Secure Location (Security or Locker, etc.) cell phone/electronics;clothing;wallet;shoes   Belongings Search Yes   Clothing Search Yes  (Simultaneous filing. User may not have seen previous data.)   Second Staff Ramy AUGUSTIN  and Stacy HUI    Pt has his black jacket,red cell phone,shoes,shirt , wallet with miscellaneous cards and pants in the unit locker.    A               Admission:  I am responsible for any personal items that are not sent to the safe or pharmacy.  [facility] is not responsible for loss, theft or damage of any property in my possession.    Signature:  _________________________________ Date: _______  Time: _____                                              Staff Signature:  ____________________________ Date: ________  Time: _____      2nd Staff person, if patient is unable/unwilling to sign:    Signature: ________________________________ Date: ________  Time: _____     Discharge:  [facility] has returned all of my personal belongings:    Signature: _________________________________ Date: ________  Time: _____                                          Staff Signature:  ____________________________ Date: ________  Time: _____

## 2020-11-12 NOTE — ED NOTES
Report given to [facility] EMTs. Personal belongings given to ENTs for transport. Patient transported to [facility] via ambulance.

## 2020-11-12 NOTE — PROGRESS NOTES
"Pt spent most of the shift withdrawn to his room, laying in bed. Pt presented with a calm and blunted affect. Pt was pleasant and cooperative upon approach. He denies SI, SIB, AH/VH. Rated both anxiety and depression 5/10, with 10 being the worst. Pt is on MSSA precautions for alcohol withdrawal. He received a score of 2 at 0400 and a score of 1 at 1042. Reported that he has been drinking 1/2 a bottle whiskey for years and that his last drink was 2 days ago. Sleep and eating habits are normal. Stated that his goals for hospitalization were to not drink as much and to not take as much adderall. Pt elaborated by saying that he \"wanted to take his own adderall but no longer wanted to buy adderall from others.\" Pt does not currently have scheduled meds.    "
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
## 2020-11-12 NOTE — PLAN OF CARE
BEHAVIORAL TEAM DISCUSSION    Participants: Dr. Smith, Psychiatry Resident, Bernie BUNCH, Rosio Martinez CTC  Progress: New patient assessment.   Anticipated length of stay: TBD  Continued Stay Criteria/Rationale: Clinical stabilization and medication management.   Medical/Physical: See H&P  Precautions:   Behavioral Orders   Procedures    Code 1 - Restrict to Unit    Routine Programming     As clinically indicated    Status 15     Every 15 minutes.    Suicide precautions     Patients on Suicide Precautions should have a Combination Diet ordered that includes a Diet selection(s) AND a Behavioral Tray selection for Safe Tray - with utensils, or Safe Tray - NO utensils      Withdrawal precautions     Plan: Patient will have ongoing psychiatric assessment. Medications will be reviewed and adjusted per MD as indicated. Outpatient providers will be contacted for care coordination. CTC will continue to assess needs and  ensure appropriate follow up care is in place.   Rationale for change in precautions or plan: None

## 2020-11-12 NOTE — PLAN OF CARE
Work Completed:Met with team. Reviewed patient's chart and progress notes. Met with patient, completed initial psychosocial assessment and plan of care note.     Discharge plan or goal: TBD.     Barriers to discharge: Clinical stabilization and medication management.

## 2020-11-12 NOTE — PHARMACY-ADMISSION MEDICATION HISTORY
Admission Medication History status for the 11/11/2020 admission is complete.  See EPIC admission navigator for Prior to Admission medications.    Medication history sources:  Patient and Sure Scripts     Medication history source reliability: Good    Changes made to PTA medication list (reason)  Added: None  Deleted: Butalbital-acetaminophen-caffeine  Changed: None    Additional medication history information (including reliability of information, actions taken by pharmacist): None    Time spent in this activity: 20 minutes    Medication history completed by: Sree Giang RP     Prior to Admission medications    Medication Sig Last Dose Taking? Auth Provider   amphetamine-dextroamphetamine (ADDERALL XR) 30 MG 24 hr capsule Take 1 capsule (30 mg) by mouth 2 times daily Past Week at Unknown time Yes Madyson Rincon PA-C   sildenafil (VIAGRA) 50 MG tablet Take 1 tablet (50 mg) by mouth daily as needed (for ED) Past Week at Unknown time Yes Madyson Rincon PA-C   traZODone (DESYREL) 50 MG tablet Take 1 tablet (50 mg) by mouth nightly as needed for sleep Past Week at Unknown time Yes Aj Salinas, DO

## 2020-11-12 NOTE — SAFE
Allan Leon  November 11, 2020    Pt was at high risk due to limited coping skills, impulsive behavior, severe gambling addiction  and medication non-adherence. Pt has impaired judgment and insight. Pt was appropriate for  inpatient service.      Current Suicidal Ideation/Self-Injurious Concerns/Methods: Other shooting himself with his friend's gun, but he has no access    Inappropriate Sexual Behavior: No    Aggression/Homicidal Ideation: None - N/A      For additional details see full DEC assessment.       Fer Cross, LP

## 2020-11-13 VITALS
HEART RATE: 79 BPM | DIASTOLIC BLOOD PRESSURE: 75 MMHG | SYSTOLIC BLOOD PRESSURE: 116 MMHG | BODY MASS INDEX: 28.24 KG/M2 | WEIGHT: 201.7 LBS | RESPIRATION RATE: 16 BRPM | OXYGEN SATURATION: 98 % | HEIGHT: 71 IN | TEMPERATURE: 98.2 F

## 2020-11-13 PROCEDURE — 250N000011 HC RX IP 250 OP 636: Performed by: PSYCHIATRY & NEUROLOGY

## 2020-11-13 PROCEDURE — 99238 HOSP IP/OBS DSCHRG MGMT 30/<: CPT | Mod: GC | Performed by: PSYCHIATRY & NEUROLOGY

## 2020-11-13 PROCEDURE — G0008 ADMIN INFLUENZA VIRUS VAC: HCPCS | Performed by: PSYCHIATRY & NEUROLOGY

## 2020-11-13 PROCEDURE — 90686 IIV4 VACC NO PRSV 0.5 ML IM: CPT | Performed by: PSYCHIATRY & NEUROLOGY

## 2020-11-13 RX ADMIN — INFLUENZA A VIRUS A/GUANGDONG-MAONAN/SWL1536/2019 CNIC-1909 (H1N1) ANTIGEN (FORMALDEHYDE INACTIVATED), INFLUENZA A VIRUS A/HONG KONG/2671/2019 (H3N2) ANTIGEN (FORMALDEHYDE INACTIVATED), INFLUENZA B VIRUS B/PHUKET/3073/2013 ANTIGEN (FORMALDEHYDE INACTIVATED), AND INFLUENZA B VIRUS B/WASHINGTON/02/2019 ANTIGEN (FORMALDEHYDE INACTIVATED) 0.5 ML: 15; 15; 15; 15 INJECTION, SUSPENSION INTRAMUSCULAR at 12:12

## 2020-11-13 ASSESSMENT — ACTIVITIES OF DAILY LIVING (ADL)
DRESS: INDEPENDENT
ORAL_HYGIENE: INDEPENDENT
HYGIENE/GROOMING: INDEPENDENT

## 2020-11-13 NOTE — DISCHARGE INSTRUCTIONS
Behavioral Discharge Planning and Instructions      Summary:  You were admitted on 11/11/2020  due to SI.  You were treated by Dr. Kole Smith MD and discharged on 11/13/2020 from [facility] to Home      Principal Diagnosis: ADHD, alcohol abuse, and gambling disorder        You met with Case Management staff, you declined referrals for outpatient providers. Listed below is a list of resources for your convenience.     Major Treatments, Procedures and Findings:  You were provided with: a psychiatric assessment, assessed for medical stability, medication evaluation and/or management, group therapy and milieu management    Symptoms to Report: feeling more aggressive, increased confusion, losing more sleep, mood getting worse or thoughts of suicide    Early warning signs can include: increased depression or anxiety sleep disturbances increased thoughts or behaviors of suicide or self-harm  increased unusual thinking, such as paranoia or hearing voices    Safety and Wellness:  Take all medicines as directed.  Make no changes unless your doctor suggests them.      Follow treatment recommendations.  Refrain from alcohol and non-prescribed drugs.  Ask your support system to help you reduce your access to items that could harm yourself or others. If there is a concern for safety, call 911.    Resources:   Crisis Intervention: 425.621.5936 or 668-189-3816 (TTY: 190.963.1431).  Call anytime for help.  National [place] on Mental Illness (www.[place].harvinder.org): 594.618.5702 or 013-318-6084.  Alcoholics Anonymous (www.alcoholics-anonymous.org): Check your phone book for your local chapter.  Suicide Awareness Voices of Education (SAVE) (www.save.org): 595-475-DNPL (0683)  National Suicide Prevention Line (www.mentalhealthmn.org): 061-621-NNJL (0960)  Mental Health Consumer/Survivor Network of [place] (www.mhcsn.net): 995.626.2582 or 815-837-5002  Mental Health Association of [place] (www.mentalhealth.org): 787.669.3316 or  "361.937.2145  Self- Management and Recovery Training., SMART-- Toll free: 187.119.4885  www.[facility].[place]  Brian[facility] Crisis Response 630-629-8462  Text 4 Life: txt \"LIFE\" to 50172 for immediate support and crisis intervention  Crisis text line: Text \"[place]\" to 497579. Free, confidential, 24/7.  Crisis Intervention: 372.561.9389 or 499-829-0536. Call anytime for help.   [facility] Crisis Team:  [phone]      The treatment team has appreciated the opportunity to work with you.     If you have any questions or concerns our unit number is [phone]  You may be receiving a follow-up phone call within the next three days from a representative from behavioral health.        "

## 2020-11-13 NOTE — PLAN OF CARE
Discharge:  alert and orientated x 4. Denies suicidal thoughts, thoughts of self harm and hallucinations. No signs or symptoms of alcohol withdrawal.  Irritable and feels frustrated with admission and hospitalization.  Requesting discharge. Denies access to guns, reports feels safe being discharged. Plan is to return to parents home, father to provide transportation home.  Received written discharge instructions with verbal explanation.  All questions answered, verbalizes understanding.  All belongings returned to patient. No take home medications provided/ needed.  Will discharge home when ride is available.

## 2020-11-13 NOTE — PROGRESS NOTES
Allan appeared comfortable and sleeping a total of 5.25 hours this night shift.  He awoke once during the shift and came to the [facility] for cereal.  MSSA = 3.  No prns given.

## 2020-11-13 NOTE — PLAN OF CARE
Work Completed: Met with team. Reviewed patient's chart and progress notes. During team meeting patient requested to be discharged. Writer met with patient to discuss outpatient referrals. Patient declined assistance with outpatient referrals indicating that he will find providers on his own. Writer completed AVS summary. Writer spoke with patient's father and this indicated that he will be able to  patient from the hospital.     Discharge plan or goal: Will return to his father's house.     Barriers to discharge: No outpatient providers, lacks coping skills

## 2020-11-14 NOTE — PROGRESS NOTES
Chart reviewed, patient examined, and discussed with treatment team.  Allan denies SI, SIB urges, HI or urges to use.  He has not had any alcohol withdrawal.  He declines CD treatment and is insistent on discharge.  He does not represent an acute danger to anyone or himself.  He is not committable. Will ashwini discharge.  No meds dispensed.    MSE:  Cooperative but dismissive.  Mood reactive.  Affect congruent.  Speech normal rate and flow.  Movements are normal.  Thought process logical and goal oriented.  No psychosis or SI.  Remote recall good.  Short term recall spotty.  Oriented x4.  Insight and judgment poor.    Kole Smith MD

## 2020-11-16 ENCOUNTER — MYC MEDICAL ADVICE (OUTPATIENT)
Dept: FAMILY MEDICINE | Facility: CLINIC | Age: 30
End: 2020-11-16
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
## 2020-11-17 ENCOUNTER — MYC MEDICAL ADVICE (OUTPATIENT)
Dept: FAMILY MEDICINE | Facility: CLINIC | Age: 30
End: 2020-11-17

## 2020-11-18 ENCOUNTER — TELEPHONE (OUTPATIENT)
Dept: FAMILY MEDICINE | Facility: CLINIC | Age: 30
End: 2020-11-18

## 2020-11-18 ENCOUNTER — OFFICE VISIT (OUTPATIENT)
Dept: FAMILY MEDICINE | Facility: CLINIC | Age: 30
End: 2020-11-18

## 2020-11-18 ENCOUNTER — CARE COORDINATION (OUTPATIENT)
Dept: FAMILY MEDICINE | Facility: CLINIC | Age: 30
End: 2020-11-18

## 2020-11-18 VITALS
SYSTOLIC BLOOD PRESSURE: 110 MMHG | DIASTOLIC BLOOD PRESSURE: 60 MMHG | BODY MASS INDEX: 28.45 KG/M2 | OXYGEN SATURATION: 96 % | WEIGHT: 204 LBS | HEART RATE: 94 BPM

## 2020-11-18 DIAGNOSIS — F90.0 ADHD (ATTENTION DEFICIT HYPERACTIVITY DISORDER), INATTENTIVE TYPE: ICD-10-CM

## 2020-11-18 DIAGNOSIS — F10.11 HISTORY OF ALCOHOL ABUSE: ICD-10-CM

## 2020-11-18 DIAGNOSIS — F43.21 ADJUSTMENT DISORDER WITH DEPRESSED MOOD: Primary | ICD-10-CM

## 2020-11-18 PROCEDURE — 99213 OFFICE O/P EST LOW 20 MIN: CPT | Performed by: PHYSICIAN ASSISTANT

## 2020-11-18 RX ORDER — BUPROPION HYDROCHLORIDE 150 MG/1
150 TABLET ORAL EVERY MORNING
Qty: 30 TABLET | Refills: 0 | Status: SHIPPED | OUTPATIENT
Start: 2020-11-18 | End: 2020-12-15

## 2020-11-18 RX ORDER — DEXTROAMPHETAMINE SACCHARATE, AMPHETAMINE ASPARTATE MONOHYDRATE, DEXTROAMPHETAMINE SULFATE AND AMPHETAMINE SULFATE 7.5; 7.5; 7.5; 7.5 MG/1; MG/1; MG/1; MG/1
30 CAPSULE, EXTENDED RELEASE ORAL 2 TIMES DAILY
Qty: 60 CAPSULE | Refills: 0 | Status: SHIPPED | OUTPATIENT
Start: 2020-11-18 | End: 2020-11-18

## 2020-11-18 RX ORDER — DEXTROAMPHETAMINE SACCHARATE, AMPHETAMINE ASPARTATE MONOHYDRATE, DEXTROAMPHETAMINE SULFATE AND AMPHETAMINE SULFATE 7.5; 7.5; 7.5; 7.5 MG/1; MG/1; MG/1; MG/1
30 CAPSULE, EXTENDED RELEASE ORAL 2 TIMES DAILY
Qty: 60 CAPSULE | Refills: 0 | Status: SHIPPED | OUTPATIENT
Start: 2020-11-18 | End: 2020-11-19

## 2020-11-18 ASSESSMENT — PATIENT HEALTH QUESTIONNAIRE - PHQ9
5. POOR APPETITE OR OVEREATING: SEVERAL DAYS
SUM OF ALL RESPONSES TO PHQ QUESTIONS 1-9: 7

## 2020-11-18 ASSESSMENT — ANXIETY QUESTIONNAIRES
3. WORRYING TOO MUCH ABOUT DIFFERENT THINGS: SEVERAL DAYS
IF YOU CHECKED OFF ANY PROBLEMS ON THIS QUESTIONNAIRE, HOW DIFFICULT HAVE THESE PROBLEMS MADE IT FOR YOU TO DO YOUR WORK, TAKE CARE OF THINGS AT HOME, OR GET ALONG WITH OTHER PEOPLE: SOMEWHAT DIFFICULT
7. FEELING AFRAID AS IF SOMETHING AWFUL MIGHT HAPPEN: NOT AT ALL
2. NOT BEING ABLE TO STOP OR CONTROL WORRYING: SEVERAL DAYS
1. FEELING NERVOUS, ANXIOUS, OR ON EDGE: SEVERAL DAYS
5. BEING SO RESTLESS THAT IT IS HARD TO SIT STILL: SEVERAL DAYS
6. BECOMING EASILY ANNOYED OR IRRITABLE: SEVERAL DAYS
GAD7 TOTAL SCORE: 6

## 2020-11-18 NOTE — TELEPHONE ENCOUNTER
Allan saw you earlier today and you sent his Adderall prescription to the [facility] in [place].  They are out of this med and asked if you could please send a new prescription to the [facility] in [place] as they have it in stock.  Once they receive it, the first [facility] will shred their prescription.    Allan's phone #[phone]

## 2020-11-18 NOTE — PROGRESS NOTES
Care Coordination Assessment    PCP: Madyson Rincon    Referral Source:  ED/IP List    Clinical Data: Patient discharged from inpatient at [facility] with Suicidal Ideation and mental health 11/15/2020.  Patient discharged home with instruction to follow up with PCP.      Plan: Patient has already followed up.  I will close encounter

## 2020-11-18 NOTE — TELEPHONE ENCOUNTER
RYLIE so the prescription you resent to [facility] was flagged for a prior authorization and they did not receive it.  I called [facility] and they confirmed it was not received.  I then called [facility] and they put a hold on filling the prescription.  I am wondering if you can call Allan tomorrow and just do a written prescription for 30 days so he can at least get one month filled tomorrow.  Otherwise I think since we sent two  even though one was cancelled it is being flagged by insurance for a prior authorization.  I have spoken to Allan and he is pretty stressed and needs his meds so I told him you would try again tomorrow.  He will probably call at 9 am when you get in!

## 2020-11-18 NOTE — NURSING NOTE
Allan is here to discuss depression and ADHD medications.          Pre-visit Screening:  Immunizations:  up to date  Colonoscopy:  NA  Mammogram: NA  Asthma Action Test/Plan:  NA  PHQ9:  Done today  GAD7:  Done today  Questioned patient about current smoking habits Pt. has never smoked.  Ok to leave detailed message on voice mail for today's visit only Yes, phone # cell
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
## 2020-11-19 RX ORDER — DEXTROAMPHETAMINE SACCHARATE, AMPHETAMINE ASPARTATE MONOHYDRATE, DEXTROAMPHETAMINE SULFATE AND AMPHETAMINE SULFATE 7.5; 7.5; 7.5; 7.5 MG/1; MG/1; MG/1; MG/1
30 CAPSULE, EXTENDED RELEASE ORAL 2 TIMES DAILY
Qty: 60 CAPSULE | Refills: 0 | Status: SHIPPED | OUTPATIENT
Start: 2020-11-19 | End: 2020-12-11

## 2020-11-19 ASSESSMENT — ANXIETY QUESTIONNAIRES: GAD7 TOTAL SCORE: 6

## 2020-11-19 NOTE — TELEPHONE ENCOUNTER
Called pt and left a voicemail for him to call us back if he needs to come pick it up or if he was able to fill.    Awaiting his return call or if he shows up to the clinic to  paper script.

## 2020-11-19 NOTE — TELEPHONE ENCOUNTER
Printed and signed paper copy. Can you please contact pt to see if he has been able to fill this through pharmacy or if he needs to  this paper copy.

## 2020-11-23 ENCOUNTER — TRANSFERRED RECORDS (OUTPATIENT)
Dept: FAMILY MEDICINE | Facility: CLINIC | Age: 30
End: 2020-11-23

## 2020-12-10 ENCOUNTER — MYC MEDICAL ADVICE (OUTPATIENT)
Dept: FAMILY MEDICINE | Facility: CLINIC | Age: 30
End: 2020-12-10

## 2020-12-10 DIAGNOSIS — F90.0 ADHD (ATTENTION DEFICIT HYPERACTIVITY DISORDER), INATTENTIVE TYPE: ICD-10-CM

## 2020-12-10 NOTE — TELEPHONE ENCOUNTER
Allan Leon is requesting a refill of:    Pending Prescriptions:                       Disp   Refills    amphetamine-dextroamphetamine (ADDERALL X*60 cap*0            Sig: Take 1 capsule (30 mg) by mouth 2 times daily    Please advise

## 2020-12-11 RX ORDER — DEXTROAMPHETAMINE SACCHARATE, AMPHETAMINE ASPARTATE MONOHYDRATE, DEXTROAMPHETAMINE SULFATE AND AMPHETAMINE SULFATE 7.5; 7.5; 7.5; 7.5 MG/1; MG/1; MG/1; MG/1
30 CAPSULE, EXTENDED RELEASE ORAL 2 TIMES DAILY
Qty: 60 CAPSULE | Refills: 0 | Status: SHIPPED | OUTPATIENT
Start: 2020-12-19

## 2020-12-15 DIAGNOSIS — F43.21 ADJUSTMENT DISORDER WITH DEPRESSED MOOD: ICD-10-CM

## 2020-12-15 DIAGNOSIS — F10.11 HISTORY OF ALCOHOL ABUSE: ICD-10-CM

## 2020-12-15 RX ORDER — BUPROPION HYDROCHLORIDE 150 MG/1
150 TABLET ORAL EVERY MORNING
Qty: 30 TABLET | Refills: 0 | Status: SHIPPED | OUTPATIENT
Start: 2020-12-15 | End: 2021-09-01

## 2020-12-15 NOTE — TELEPHONE ENCOUNTER
Allan Leon is requesting a refill of:    Pending Prescriptions:                       Disp   Refills    buPROPion (WELLBUTRIN XL) 150 MG 24 hr ta*30 tab*0            Sig: Take 1 tablet (150 mg) by mouth every morning    Please close encounter if RX was sent. Thanks, Naomy

## 2021-01-04 ENCOUNTER — OFFICE VISIT (OUTPATIENT)
Dept: PHYSICAL MEDICINE AND REHAB | Facility: CLINIC | Age: 31
End: 2021-01-04
Payer: COMMERCIAL

## 2021-01-04 VITALS
HEART RATE: 97 BPM | WEIGHT: 205 LBS | OXYGEN SATURATION: 96 % | HEIGHT: 71 IN | DIASTOLIC BLOOD PRESSURE: 76 MMHG | SYSTOLIC BLOOD PRESSURE: 124 MMHG | BODY MASS INDEX: 28.7 KG/M2

## 2021-01-04 DIAGNOSIS — G43.009 MIGRAINE WITHOUT AURA AND WITHOUT STATUS MIGRAINOSUS, NOT INTRACTABLE: Primary | ICD-10-CM

## 2021-01-04 DIAGNOSIS — Z86.69 HX OF MIGRAINES: ICD-10-CM

## 2021-01-04 DIAGNOSIS — S16.1XXD CERVICAL MYOFASCIAL STRAIN, SUBSEQUENT ENCOUNTER: ICD-10-CM

## 2021-01-04 DIAGNOSIS — S06.0X0D CONCUSSION WITHOUT LOSS OF CONSCIOUSNESS, SUBSEQUENT ENCOUNTER: ICD-10-CM

## 2021-01-04 PROCEDURE — 64615 CHEMODENERV MUSC MIGRAINE: CPT | Performed by: PHYSICAL MEDICINE & REHABILITATION

## 2021-01-04 PROCEDURE — 99215 OFFICE O/P EST HI 40 MIN: CPT | Mod: 25 | Performed by: PHYSICAL MEDICINE & REHABILITATION

## 2021-01-04 RX ORDER — GUANFACINE 1 MG/1
1 TABLET ORAL DAILY
COMMUNITY
Start: 2020-12-28 | End: 2022-06-23

## 2021-01-04 RX ORDER — RIZATRIPTAN BENZOATE 10 MG/1
10 TABLET, ORALLY DISINTEGRATING ORAL
Qty: 14 TABLET | Refills: 1 | Status: SHIPPED | OUTPATIENT
Start: 2021-01-04 | End: 2021-01-18

## 2021-01-04 RX ORDER — RIZATRIPTAN BENZOATE 10 MG/1
TABLET ORAL
COMMUNITY
End: 2021-04-16

## 2021-01-04 ASSESSMENT — MIFFLIN-ST. JEOR: SCORE: 1922

## 2021-01-04 ASSESSMENT — PAIN SCALES - GENERAL: PAINLEVEL: MILD PAIN (2)
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
## 2021-01-04 NOTE — NURSING NOTE
"Chief Complaint   Patient presents with     Head Injury     botox injection       Vitals:    01/04/21 1642   BP: 124/76   Pulse: 97   SpO2: 96%   Weight: 93 kg (205 lb)   Height: 1.803 m (5' 11\")       Body mass index is 28.59 kg/m .    CONCUSSION SYMPTOMS ASSESSMENT 4/22/2020 7/13/2020 1/4/2021   Headache or Pressure In Head 2 - mild to moderate 2 - mild to moderate 2 - mild to moderate   Upset Stomach or Throwing Up 0 - none 0 - none 0 - none   Problems with Balance 0 - none 0 - none 0 - none   Feeling Dizzy 0 - none 0 - none 0 - none   Sensitivity to Light 1 - mild 0 - none 0 - none   Sensitivity to Noise 3 - moderate 0 - none 3 - moderate   Mood Changes 2 - mild to moderate 2 - mild to moderate 5 - severe   Feeling sluggish, hazy, or foggy 3 - moderate 1 - mild 5 - severe   Trouble Concentrating, Lack of Focus 5 - severe 1 - mild 5 - severe   Motion Sickness 0 - none 0 - none 0 - none   Vision Changes 0 - none 0 - none 0 - none   Memory Problems 5 - severe 2 - mild to moderate 3 - moderate   Feeling Confused 4 - moderate to severe 1 - mild 4 - moderate to severe   Neck Pain 3 - moderate 2 - mild to moderate 4 - moderate to severe   Trouble Sleeping 1 - mild 1 - mild 2 - mild to moderate   Total Number of Symptoms 10 8 9   Symptom Severity Score 29 12 33                         "

## 2021-01-15 ENCOUNTER — HEALTH MAINTENANCE LETTER (OUTPATIENT)
Age: 31
End: 2021-01-15

## 2021-02-03 ENCOUNTER — TRANSFERRED RECORDS (OUTPATIENT)
Dept: FAMILY MEDICINE | Facility: CLINIC | Age: 31
End: 2021-02-03

## 2021-02-08 ENCOUNTER — TELEPHONE (OUTPATIENT)
Dept: PHYSICAL MEDICINE AND REHAB | Facility: CLINIC | Age: 31
End: 2021-02-08

## 2021-02-08 DIAGNOSIS — S06.0X0D CONCUSSION WITHOUT LOSS OF CONSCIOUSNESS, SUBSEQUENT ENCOUNTER: Primary | ICD-10-CM

## 2021-02-08 DIAGNOSIS — G43.009 MIGRAINE WITHOUT AURA AND WITHOUT STATUS MIGRAINOSUS, NOT INTRACTABLE: Primary | ICD-10-CM

## 2021-02-08 RX ORDER — RIZATRIPTAN BENZOATE 10 MG/1
10 TABLET, ORALLY DISINTEGRATING ORAL DAILY PRN
Qty: 14 TABLET | Refills: 1 | Status: SHIPPED | OUTPATIENT
Start: 2021-02-08 | End: 2021-02-22

## 2021-02-08 NOTE — TELEPHONE ENCOUNTER
Called patient to inquire of  call I received today from his father.Patient claims that his headaches have become so severe he has difficulty coping and threatens suicide.He does not have a plan to cause injury to self and states this threat is only out of frustration. The headaches are 10/10 pain and last for 30 minutes at a time. He gets these multiple times per day. His mood swings are escalated during these headaches also. He does have an appointment set up with psychology in two weeks. He does take Maxalt for these headaches which he find to be helpful but he has been out for awhile and did not refill.

## 2021-02-19 ENCOUNTER — TRANSFERRED RECORDS (OUTPATIENT)
Dept: FAMILY MEDICINE | Facility: CLINIC | Age: 31
End: 2021-02-19

## 2021-03-01 ENCOUNTER — NURSE TRIAGE (OUTPATIENT)
Dept: NURSING | Facility: CLINIC | Age: 31
End: 2021-03-01

## 2021-03-01 NOTE — TELEPHONE ENCOUNTER
Dad is caller. Unable to triage as pt is not present. Pt is 30 yrs old. No information was given to dad. Only answered general questions. Dad states pt has been to [facility] ED 4x in past 1 1/2 week for groin pain. Pain not improving and they want him to see a urologist or surgeon. Pt has PCP at [facility]) Advised dad we cannot give specific advice as pt is not there. Advised pt may call FNA himself or may call his pcp office. Advised pt needs appt w/ pcp to discuss plan. PCP follow-up is needed after ED visit. If pcp not available, schedule w/ another provider at that clinic.      Reason for Disposition    [1] Caller requesting NON-URGENT health information AND [2] PCP's office is the best resource    Additional Information    Negative: [1] Caller is not with the adult (patient) AND [2] reporting urgent symptoms    Negative: Lab result questions    Negative: Medication questions    Negative: Caller can't be reached by phone    Negative: Caller has already spoken to PCP or another triager    Negative: RN needs further essential information from caller in order to complete triage    Negative: Requesting regular office appointment    Protocols used: INFORMATION ONLY CALL-A-

## 2021-03-02 ENCOUNTER — OFFICE VISIT (OUTPATIENT)
Dept: FAMILY MEDICINE | Facility: CLINIC | Age: 31
End: 2021-03-02

## 2021-03-02 DIAGNOSIS — R10.31 BILATERAL GROIN PAIN: ICD-10-CM

## 2021-03-02 DIAGNOSIS — M54.10 RADICULAR PAIN OF RIGHT LOWER EXTREMITY: ICD-10-CM

## 2021-03-02 DIAGNOSIS — N50.811 TESTICULAR PAIN, RIGHT: Primary | ICD-10-CM

## 2021-03-02 DIAGNOSIS — R10.32 BILATERAL GROIN PAIN: ICD-10-CM

## 2021-03-02 PROCEDURE — 99214 OFFICE O/P EST MOD 30 MIN: CPT | Performed by: FAMILY MEDICINE

## 2021-03-02 RX ORDER — NAPROXEN 500 MG/1
500 TABLET ORAL
COMMUNITY
Start: 2021-02-28 | End: 2021-06-22

## 2021-03-02 RX ORDER — CEFDINIR 300 MG/1
CAPSULE ORAL
COMMUNITY
End: 2021-04-16

## 2021-03-02 RX ORDER — MELOXICAM 15 MG/1
TABLET ORAL
COMMUNITY
Start: 2021-02-18 | End: 2021-12-10

## 2021-03-02 RX ORDER — CYCLOBENZAPRINE HCL 10 MG
TABLET ORAL
COMMUNITY
Start: 2021-01-16 | End: 2021-10-27

## 2021-03-02 ASSESSMENT — MIFFLIN-ST. JEOR: SCORE: 1903.86

## 2021-03-02 NOTE — NURSING NOTE
Allan is here for ongoing groin pain for 2 weeks, pt had a MRI on 2/19/2021. Also went to Urologist.        Pre-visit Screening:  Immunizations:  up to date  Colonoscopy:  NA  Mammogram: NA  Asthma Action Test/Plan:  NA  PHQ9:  UTD  GAD7:  UTD  Questioned patient about current smoking habits Pt. quit smoking some time ago.  Ok to leave detailed message on voice mail for today's visit only Yes, phone # [phone]

## 2021-03-02 NOTE — PROGRESS NOTES
SUBJECTIVE:  Allan Leon, a 30 year old male scheduled an appointment to discuss the following issues:     Testicular pain, right  Radicular pain of right lower extremity  Bilateral groin pain  Pt here with ongoing issues with the above.  He was seen in the ED 2/7/21 for testicular pain- had US showing signs epididymitis, neg GC/chlam, neg UA and CBC-given Zpack and Rocephin    Returned to ED 2/8/21 for increased testicular pain- US again showed only signs epididymitis-started on doxycycline.  He did not feel much better    He then went to see urology Dr Machado at [facility] 2/18/21- UA OK, ultimately sent for MRI as feeling his issues may be lumbar radicular in nature.        Pt had MRI 2/19/21-report showed no disc herniation or clear abnormalities    Pt again went to ED 2/28/21-symptoms same, UA, labs,all normal- MD felt this was possibly musculoskeletal issue as well-treated with Nsaids    Also placed on Ceftin    Pt still c/o groin pain on both sides, testicular pain both sides, right>left, and tightness/aching in right hamstring area.     He lifts heavy weight at work- has found that when he works it gets much worse.  He is stable if avoids work.    He was also referred to surgery Dr Briscoe but did not schedule yet as was awaiting MRI and plan from Dr Machado of urology      Medical, social, surgical, and family histories reviewed.    ROS:  CONSTITUTIONAL: NEGATIVE for fever, chills  RESP: NEGATIVE for significant cough or SOB  CV: NEGATIVE for chest pain, palpitations   GI: NEGATIVE for nausea, abdominal pain, heartburn, or change in bowel habits  : NEGATIVE for frequency, dysuria, or hematuria  MUSCULOSKELETAL: NEGATIVE for significant arthralgias or myalgia  NEURO: NEGATIVE for weakness, dizziness or paresthesias or headache  ENDOCRINE: NEGATIVE for temperature intolerance, skin/hair changes  PSYCHIATRIC: NEGATIVE for changes in mood or affect    OBJECTIVE:  /72   Pulse 78   Temp 98.2  " F (36.8  C) (Oral)   Ht 1.803 m (5' 11\")   Wt 91.2 kg (201 lb)   SpO2 97%   BMI 28.03 kg/m    EXAM:  GENERAL APPEARANCE: healthy, alert and no distress  EYES: EOMI,  PERRL  HENT: ear canals and TM's normal and nose and mouth without ulcers or lesions  RESP: lungs clear to auscultation - no rales, rhonchi or wheezes  CV: regular rates and rhythm, normal S1 S2, no S3 or S4 and no murmur, click or rub -  ABDOMEN:  soft, mildly tender low abd diffusely, no HSM or masses and bowel sounds normal  GU_male: both testicles tender to palpation, right >left , no hernias and penis normal without urethral discharge  MS: extremities normal- no gross deformities noted, no evidence of inflammation in joints, FROM in all extremities. Cervical, thoracic and lumbar spine exam is normal without tenderness, masses or kyphoscoliosis. Full range of motion without pain is noted. Neg straight leg-raise bilaterally    SKIN: no suspicious lesions or rashes  PSYCH: mentation appears normal and affect normal/bright    ASSESSMENT/PLAN:  (N50.811) Testicular pain, right  (primary encounter diagnosis)  Comment: unclear etiology-has shown signs epididymitis on 2 U/S- on 2 abx and no change- has seen urology  Plan: continue current medications at current doses , call Dr Machado today-needs to f/u asap    (M54.10) Radicular pain of right lower extremity  Comment: no signs disc herniation on MRI  Plan: f/u with Dr Machado, no work for next 2 days    (R10.31,  R10.32) Bilateral groin pain  Comment: unclear etiology-pt has been referred to surgery and I think this is a good next step but start with DR Machado      Plan: see urology and likely surgery, no clear etiology today, reviewed all records pertinent to this issue    30 minutes spent in review of chart, visit, discussions     "

## 2021-03-02 NOTE — LETTER
[facility]  [address], [address]  [place]  [address]    March 2, 2021        RE: Allan CORDERO Carolyn  [address] [facility] [address] [id number]          To Whom It May Concern,     Allan was seen today in clinic.  He may not work 3/3/21 or 3/4/21.         If you have any further questions or problems, please contact our office at [phone].          Prasanth Covarrubias MD

## 2021-03-03 VITALS
HEART RATE: 78 BPM | SYSTOLIC BLOOD PRESSURE: 110 MMHG | HEIGHT: 71 IN | BODY MASS INDEX: 28.14 KG/M2 | TEMPERATURE: 98.2 F | OXYGEN SATURATION: 97 % | WEIGHT: 201 LBS | DIASTOLIC BLOOD PRESSURE: 72 MMHG

## 2021-03-05 ENCOUNTER — TELEPHONE (OUTPATIENT)
Dept: FAMILY MEDICINE | Facility: CLINIC | Age: 31
End: 2021-03-05

## 2021-03-05 NOTE — TELEPHONE ENCOUNTER
Pt had OV 3/2/2021, pt is not able to get in before Wednesday. Pt is having lots of groin pain and feel that it is spreading and getting bigger. Pt wants to get a call back.     Pt #  [phone]

## 2021-03-05 NOTE — TELEPHONE ENCOUNTER
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

## 2021-03-05 NOTE — LETTER
[facility]  [address], [address]  [place]  [address]    March 5, 2021        RE: Allan Leon  [address] [address] [address]      To Whom It May Concern,       Please excuse Allan from work through 3/10/21.         If you have any further questions or problems, please contact our office at [phone].          Prasanth Covarrubias MD

## 2021-04-16 ENCOUNTER — OFFICE VISIT (OUTPATIENT)
Dept: PHYSICAL MEDICINE AND REHAB | Facility: CLINIC | Age: 31
End: 2021-04-16
Payer: COMMERCIAL

## 2021-04-16 VITALS
HEART RATE: 105 BPM | DIASTOLIC BLOOD PRESSURE: 78 MMHG | HEIGHT: 71 IN | OXYGEN SATURATION: 100 % | SYSTOLIC BLOOD PRESSURE: 120 MMHG | BODY MASS INDEX: 29.4 KG/M2 | WEIGHT: 210 LBS

## 2021-04-16 DIAGNOSIS — G43.009 MIGRAINE WITHOUT AURA AND WITHOUT STATUS MIGRAINOSUS, NOT INTRACTABLE: ICD-10-CM

## 2021-04-16 DIAGNOSIS — S06.0X0D CONCUSSION WITHOUT LOSS OF CONSCIOUSNESS, SUBSEQUENT ENCOUNTER: Primary | ICD-10-CM

## 2021-04-16 DIAGNOSIS — S16.1XXD CERVICAL MYOFASCIAL STRAIN, SUBSEQUENT ENCOUNTER: ICD-10-CM

## 2021-04-16 PROCEDURE — 99215 OFFICE O/P EST HI 40 MIN: CPT | Mod: 25 | Performed by: PHYSICAL MEDICINE & REHABILITATION

## 2021-04-16 PROCEDURE — 64615 CHEMODENERV MUSC MIGRAINE: CPT | Performed by: PHYSICAL MEDICINE & REHABILITATION

## 2021-04-16 RX ORDER — RIZATRIPTAN BENZOATE 10 MG/1
10 TABLET ORAL
Qty: 14 TABLET | Refills: 3 | Status: SHIPPED | OUTPATIENT
Start: 2021-04-16 | End: 2021-04-30

## 2021-04-16 RX ORDER — RIZATRIPTAN BENZOATE 10 MG/1
TABLET ORAL
Qty: 14 TABLET | Refills: 3 | Status: SHIPPED | OUTPATIENT
Start: 2021-04-16 | End: 2021-07-23

## 2021-04-16 ASSESSMENT — MIFFLIN-ST. JEOR: SCORE: 1944.68

## 2021-04-16 ASSESSMENT — PAIN SCALES - GENERAL: PAINLEVEL: MODERATE PAIN (4)
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
## 2021-04-19 ENCOUNTER — OFFICE VISIT (OUTPATIENT)
Dept: FAMILY MEDICINE | Facility: CLINIC | Age: 31
End: 2021-04-19

## 2021-04-19 VITALS
TEMPERATURE: 97.3 F | BODY MASS INDEX: 29.68 KG/M2 | WEIGHT: 212 LBS | OXYGEN SATURATION: 96 % | SYSTOLIC BLOOD PRESSURE: 120 MMHG | HEART RATE: 89 BPM | DIASTOLIC BLOOD PRESSURE: 82 MMHG | HEIGHT: 71 IN

## 2021-04-19 DIAGNOSIS — N50.812 PAIN IN LEFT TESTICLE: Primary | ICD-10-CM

## 2021-04-19 DIAGNOSIS — R10.31 ABDOMINAL PAIN, RIGHT LOWER QUADRANT: ICD-10-CM

## 2021-04-19 PROCEDURE — 99214 OFFICE O/P EST MOD 30 MIN: CPT | Performed by: PHYSICIAN ASSISTANT

## 2021-04-19 RX ORDER — AZITHROMYCIN 250 MG/1
1000 TABLET, FILM COATED ORAL DAILY
Qty: 4 TABLET | Refills: 0 | Status: SHIPPED | OUTPATIENT
Start: 2021-04-19 | End: 2021-04-20

## 2021-04-19 ASSESSMENT — MIFFLIN-ST. JEOR: SCORE: 1953.76

## 2021-04-19 NOTE — PROGRESS NOTES
"CC: Testicular pain, abdominal pain    History:  Testicular pain:  Allan is here today with left testicular pain. This has been ongoing for the past 2 months. Had appt with urologist 2/19/2021 to discuss bilateral testicular pain and groin pain. Was found to have right inguinal hernia, which he had repaired 3/16/2021. This did not resolve right low abdomen, groin pain. Had low back MRI through [facility] and was normal. Now in the 2 days or so is having left testicular pain. Had unprotected sex 1 week. He did notice burning at tip of penis last night. No discharge.     Has been taking testosterone and steroid for the past 6 weeks. Testosterone ethonate injections. This is not prescribed to him, just him and his friends take it.  Also take steroid Anavor that is no prescribed.     Abdominal Pain:  Has been having general abdominal pain for the past 1 month, mainly in lower abdomen. Mostly in right lower abdomen. Can last for an hour. Pain is 8/10 with 10 being the worst. [place] like he was backed up after surgery 3/16/2021, so took a lot of laxatives. Only water that comes out. Has done enemas. Feels like he hasn't had a solid poop in a while. Family history of mom having polyps.     PMH, MEDICATIONS, ALLERGIES, SOCIAL AND FAMILY HISTORY in [facility] and reviewed by me personally.    ROS negative other than the symptoms noted above in the HPI.    Examination   /82 (BP Location: Left arm, Patient Position: Sitting, Cuff Size: Adult Large)   Pulse 89   Temp 97.3  F (36.3  C) (Oral)   Ht 1.803 m (5' 11\")   Wt 96.2 kg (212 lb)   SpO2 96%   BMI 29.57 kg/m       Constitutional: Sitting comfortably, in no acute distress. Vital signs noted  Neck:  no adenopathy, trachea midline and normal to palpation, thyroid normal to palpation  Cardiovascular:  regular rate and rhythm, no murmurs, clicks, or gallops  Respiratory:  normal respiratory rate and rhythm, lungs clear to auscultation  : External genitalia without abnormalities. " No hernia. Left testicle with tenderness of epididymus.   SKIN: No jaundice/pallor/rash.   Psychiatric: mentation appears normal and affect normal/bright    A/P    ICD-10-CM    1. Pain in left testicle  N50.812 Chlam Trach/N. Gonorrhoeae RNA TMA([facility]     VENOUS COLLECTION     HIV 1/2 Agn Lilliana 4th Gen w Reflex ([facility])     RPR with Rflx to Titer and Confirm ([facility])     HSV 1/2 Lilliana IgM Reflex to Titer ([facility])     TESTOSTERONE TOTAL ([facility])     azithromycin (ZITHROMAX) 250 MG tablet   2. Abdominal pain, right lower quadrant  R10.31        DISCUSSION:  Testicular pain:  Prescribed 1 g of azithromycin. Will check G/C today. This ended up being negative, but patient already took azithromycin. Too soon to check HIV, syphilis, but patient agrees to return next month for lab only. Discussed negative G/C with patient later in the week, and testicular pain is improved, he is more bothered by abdominal pain.    Abdominal Pain:  Allan sent me several imaging study results including normal Lumbar spine MRI as well as CT abdomen pelvis from 3/18/2021 (attached in 4/23/2021 telephone encounter) 2 days post op that does mention stranding, and do state they can't rule out perforated viscous. I am not sure if his surgeon ever saw this result, so I will contact them to make sure they are aware.     Testosterone use:  I also checked Jamshid testosterone level which was elevated. Will recommend go off testosterone.     follow up visit: As needed    Madyson Gay PA-C  [facility]

## 2021-04-19 NOTE — NURSING NOTE
Allan is here for:   -left testicular pain  -would like to get testosterone levels check  -abdominal pain post hernia surgery  -requesting a colonoscopy referral for abdominal pain        Pre-visit Screening:  Immunizations:  up to date  Colonoscopy:  NA  Mammogram: NA  Asthma Action Test/Plan:  NA  PHQ9:  NA  GAD7:  NA  Questioned patient about current smoking habits Pt. has never smoked.  Ok to leave detailed message on voice mail for today's visit only Yes, phone # [phone]

## 2021-04-21 LAB
CHLAMYDIA TRACHOMATIS RNA, TMA - QUEST: NOT DETECTED
NEISSERIA GONORRHOEAE RNA TMA: NOT DETECTED
SEE NOTE - QUEST: NORMAL

## 2021-04-23 ENCOUNTER — MYC MEDICAL ADVICE (OUTPATIENT)
Dept: FAMILY MEDICINE | Facility: CLINIC | Age: 31
End: 2021-04-23

## 2021-04-23 DIAGNOSIS — N50.812 PAIN IN LEFT TESTICLE: ICD-10-CM

## 2021-04-23 PROCEDURE — 36415 COLL VENOUS BLD VENIPUNCTURE: CPT | Performed by: PHYSICIAN ASSISTANT

## 2021-04-25 LAB — TESTOSTERONE, TOTAL, LC/MS/MS-QUEST: 1179 NG/DL (ref 250–1100)

## 2021-04-28 ENCOUNTER — MYC MEDICAL ADVICE (OUTPATIENT)
Dept: FAMILY MEDICINE | Facility: CLINIC | Age: 31
End: 2021-04-28

## 2021-04-28 NOTE — TELEPHONE ENCOUNTER
Spoke to Dr. Briscoe. She advised that it would be okay to do another CT scan of abdomen to see if post surgical finding from 3/18/2021 have resolved. Also advised he f/u with GI. Explained this to Allan and he already had f/u imaging, so he is okay waiting until his appt. Pain is at a constant 5/10 with 10 being the worst, but will increase to 8/10 at times.     Informed of elevated testosterone and encouraged him to wean off all exogenous steroids and testosterone.

## 2021-04-28 NOTE — TELEPHONE ENCOUNTER
Completed form to cover him until he can see Gi 5/12. Encouraged him to bring form to that appt to have it updated based on their evaluation. Try to get in sooner to GI if possible. Spoke to pt. Scanned into chart. Left at  for pt to .

## 2021-05-12 DIAGNOSIS — N50.812 PAIN IN LEFT TESTICLE: ICD-10-CM

## 2021-05-12 PROCEDURE — 87389 HIV-1 AG W/HIV-1&-2 AB AG IA: CPT | Mod: 90 | Performed by: PHYSICIAN ASSISTANT

## 2021-05-12 PROCEDURE — 36415 COLL VENOUS BLD VENIPUNCTURE: CPT | Performed by: PHYSICIAN ASSISTANT

## 2021-05-12 PROCEDURE — 86696 HERPES SIMPLEX TYPE 2 TEST: CPT | Mod: 90 | Performed by: PHYSICIAN ASSISTANT

## 2021-05-12 PROCEDURE — 86695 HERPES SIMPLEX TYPE 1 TEST: CPT | Mod: 90 | Performed by: PHYSICIAN ASSISTANT

## 2021-05-12 PROCEDURE — 86592 SYPHILIS TEST NON-TREP QUAL: CPT | Mod: 90 | Performed by: PHYSICIAN ASSISTANT

## 2021-05-15 LAB
HIV 1/2 AGN ABY 4TH GEN WITH REFLEX: NORMAL
HSV 1 IGM TITER: ABNORMAL
HSV 2 IGM TITER: ABNORMAL
Lab: POSITIVE
Lab: POSITIVE
RPR SCREEN - QUEST: NORMAL

## 2021-06-03 ENCOUNTER — TRANSFERRED RECORDS (OUTPATIENT)
Dept: FAMILY MEDICINE | Facility: CLINIC | Age: 31
End: 2021-06-03

## 2021-06-15 ENCOUNTER — MYC MEDICAL ADVICE (OUTPATIENT)
Dept: FAMILY MEDICINE | Facility: CLINIC | Age: 31
End: 2021-06-15

## 2021-06-15 NOTE — LETTER
[facility]  [address], [address]  [place]  [address]    Kathie 15, 2021        Allan Leon  [address]  [place] [address]  : 1990              To Whom It May Concern:    Allan is a patient at our clinic.     Please allow Allan to return to work without restriction 2021.     If you have any further questions or problems, please contact our office at [phone].          Madyson Gay PA-C

## 2021-06-21 ENCOUNTER — TELEPHONE (OUTPATIENT)
Dept: FAMILY MEDICINE | Facility: CLINIC | Age: 31
End: 2021-06-21

## 2021-06-21 NOTE — TELEPHONE ENCOUNTER
Pt called to say shooting pains in abdomen. Wants someone to look at his liver, constipated, trouble urinating. Pt does not want to go to ER, advised if the pain gets worse he really needs to go to ER. Did make appt tomorrow to see [facility] for referral.

## 2021-06-22 ENCOUNTER — OFFICE VISIT (OUTPATIENT)
Dept: FAMILY MEDICINE | Facility: CLINIC | Age: 31
End: 2021-06-22

## 2021-06-22 ENCOUNTER — TRANSFERRED RECORDS (OUTPATIENT)
Dept: FAMILY MEDICINE | Facility: CLINIC | Age: 31
End: 2021-06-22

## 2021-06-22 VITALS
TEMPERATURE: 97.9 F | HEART RATE: 84 BPM | OXYGEN SATURATION: 98 % | WEIGHT: 213 LBS | BODY MASS INDEX: 29.71 KG/M2 | SYSTOLIC BLOOD PRESSURE: 110 MMHG | DIASTOLIC BLOOD PRESSURE: 68 MMHG

## 2021-06-22 DIAGNOSIS — R10.11 RUQ ABDOMINAL PAIN: Primary | ICD-10-CM

## 2021-06-22 PROCEDURE — 99213 OFFICE O/P EST LOW 20 MIN: CPT | Performed by: PHYSICIAN ASSISTANT

## 2021-06-22 NOTE — PROGRESS NOTES
"  Assessment & Plan     RUQ abdominal pain  - CT Abdomen Pelvis w Contrast; Future  - RADIOLOGY REFERRAL  Given that Allan's symptoms significantly worsened following 3/16/2021 hernia repair, and CT abdomen pelvis 3/18/2021 did show pneumoperitoneum that was expected to resolve following surgery, agreed to order repeat CT abdomen pelvis to compare. Will contact Allan with results when available.     Review of prior external note(s) from - Outside records from [facility] ER visit 6/21/2021 Scanned lab results into chart.   20 minutes spent on the date of the encounter doing chart review, review of outside records, review of test results, interpretation of tests, patient visit and documentation        BMI:   Estimated body mass index is 29.71 kg/m  as calculated from the following:    Height as of 4/19/21: 1.803 m (5' 11\").    Weight as of this encounter: 96.6 kg (213 lb).   Weight management plan: Discussed healthy diet and exercise guidelines    FURTHER TESTING:       - CT abdomen pelvis    No follow-ups on file.    Madyson Gay PA-C  [facility]    Subjective   Allan is a 31 year old who presents for the following health issues  accompanied by.    HPI     ED/UC Followup:    Facility:  [facility] ER in [place], [place]  Date of visit: 6/21/21  Reason for visit: abdominal pain  Current Status: Still has URQ pain, constipation     HPI:  Went to ER last night with severe RUQ pain for 1-2 hours, which was worse than his baseline constant pain. Pain can be sharp or stabbing, or intense cramping. Has had this to some degree for many months, but seemed to worsen and become more severe ever since 3/16/2021 hernia repair. Allan did stop taking his non-prescribed steroids 2-3 days ago, and continues to take non-prescribed testosterone, but knows he should try to wean off of this. At this point he constantly feels constipated, feels like the muscles that would push stool out aren't " working.      Review of Systems     Constitutional, HEENT, cardiovascular, pulmonary, gi and gu systems are negative, except as otherwise noted.      Objective    /68 (BP Location: Left arm, Patient Position: Sitting, Cuff Size: Adult Large)   Pulse 84   Temp 97.9  F (36.6  C) (Temporal)   Wt 96.6 kg (213 lb)   SpO2 98%   BMI 29.71 kg/m    Body mass index is 29.71 kg/m .  Physical Exam   GENERAL: healthy, alert and no distress  RESP: lungs clear to auscultation - no rales, rhonchi or wheezes  CV: regular rate and rhythm, normal S1 S2, no S3 or S4, no murmur, click or rub, no peripheral edema and peripheral pulses strong  ABDOMEN: soft, tender to palpation in RUQ, no hepatosplenomegaly, no masses and bowel sounds normal  PSYCH: mentation appears normal, affect normal/bright

## 2021-06-24 ENCOUNTER — MYC MEDICAL ADVICE (OUTPATIENT)
Dept: FAMILY MEDICINE | Facility: CLINIC | Age: 31
End: 2021-06-24

## 2021-06-26 ENCOUNTER — MYC REFILL (OUTPATIENT)
Dept: FAMILY MEDICINE | Facility: CLINIC | Age: 31
End: 2021-06-26

## 2021-06-26 DIAGNOSIS — B00.9 HSV (HERPES SIMPLEX VIRUS) INFECTION: ICD-10-CM

## 2021-06-28 NOTE — TELEPHONE ENCOUNTER
Patient is requesting a refill of  Pending Prescriptions:                       Disp   Refills    valACYclovir (VALTREX) 1000 mg tablet     20 tab*0            Sig: Take 1 tablet (1,000 mg) by mouth 2 times daily    Patient last had a refill of this medication on 5/24/2021. Routing to Madyson for approval or denial.

## 2021-06-29 RX ORDER — VALACYCLOVIR HYDROCHLORIDE 1 G/1
1000 TABLET, FILM COATED ORAL 2 TIMES DAILY
Qty: 20 TABLET | Refills: 0 | OUTPATIENT
Start: 2021-06-29

## 2021-07-06 ENCOUNTER — MYC REFILL (OUTPATIENT)
Dept: FAMILY MEDICINE | Facility: CLINIC | Age: 31
End: 2021-07-06

## 2021-07-06 DIAGNOSIS — N52.9 ERECTILE DYSFUNCTION, UNSPECIFIED ERECTILE DYSFUNCTION TYPE: ICD-10-CM

## 2021-07-06 NOTE — TELEPHONE ENCOUNTER
Patient is requesting a refill of  Pending Prescriptions:                       Disp   Refills    sildenafil (VIAGRA) 50 MG tablet          30 tab*0            Sig: Take 1 tablet (50 mg) by mouth daily as needed           (for ED)    Routing to Madyson for review, patient was last seen on 6/22/21 but this was for abdominal pain.

## 2021-07-23 ENCOUNTER — HOSPITAL ENCOUNTER (OUTPATIENT)
Dept: NUCLEAR MEDICINE | Facility: CLINIC | Age: 31
Setting detail: NUCLEAR MEDICINE
Discharge: HOME | End: 2021-07-23
Attending: PHYSICIAN ASSISTANT | Admitting: PHYSICIAN ASSISTANT
Payer: COMMERCIAL

## 2021-07-23 ENCOUNTER — OFFICE VISIT (OUTPATIENT)
Dept: PHYSICAL MEDICINE AND REHAB | Facility: CLINIC | Age: 31
End: 2021-07-23
Payer: COMMERCIAL

## 2021-07-23 VITALS
OXYGEN SATURATION: 97 % | HEIGHT: 71 IN | DIASTOLIC BLOOD PRESSURE: 61 MMHG | HEART RATE: 78 BPM | SYSTOLIC BLOOD PRESSURE: 116 MMHG | BODY MASS INDEX: 29.4 KG/M2 | WEIGHT: 210 LBS

## 2021-07-23 DIAGNOSIS — G89.29 CHRONIC RUQ PAIN: ICD-10-CM

## 2021-07-23 DIAGNOSIS — G43.019 INTRACTABLE MIGRAINE WITHOUT AURA AND WITHOUT STATUS MIGRAINOSUS: ICD-10-CM

## 2021-07-23 DIAGNOSIS — R10.11 CHRONIC RUQ PAIN: ICD-10-CM

## 2021-07-23 DIAGNOSIS — S06.0X0D CONCUSSION WITHOUT LOSS OF CONSCIOUSNESS, SUBSEQUENT ENCOUNTER: Primary | ICD-10-CM

## 2021-07-23 DIAGNOSIS — R93.2 ABNORMAL CT SCAN, GALLBLADDER: ICD-10-CM

## 2021-07-23 DIAGNOSIS — S16.1XXD CERVICAL MYOFASCIAL STRAIN, SUBSEQUENT ENCOUNTER: ICD-10-CM

## 2021-07-23 DIAGNOSIS — G43.009 MIGRAINE WITHOUT AURA AND WITHOUT STATUS MIGRAINOSUS, NOT INTRACTABLE: ICD-10-CM

## 2021-07-23 PROCEDURE — 64615 CHEMODENERV MUSC MIGRAINE: CPT | Mod: GC | Performed by: PHYSICAL MEDICINE & REHABILITATION

## 2021-07-23 PROCEDURE — 343N000001 HC RX 343: Performed by: PHYSICIAN ASSISTANT

## 2021-07-23 PROCEDURE — A9537 TC99M MEBROFENIN: HCPCS | Performed by: PHYSICIAN ASSISTANT

## 2021-07-23 PROCEDURE — 99215 OFFICE O/P EST HI 40 MIN: CPT | Mod: 25 | Performed by: PHYSICAL MEDICINE & REHABILITATION

## 2021-07-23 PROCEDURE — 78227 HEPATOBIL SYST IMAGE W/DRUG: CPT

## 2021-07-23 RX ORDER — SILDENAFIL 100 MG/1
TABLET, FILM COATED ORAL
COMMUNITY
Start: 2021-07-08 | End: 2021-09-01

## 2021-07-23 RX ORDER — KIT FOR THE PREPARATION OF TECHNETIUM TC 99M MEBROFENIN 45 MG/10ML
5 INJECTION, POWDER, LYOPHILIZED, FOR SOLUTION INTRAVENOUS ONCE
Status: COMPLETED | OUTPATIENT
Start: 2021-07-23 | End: 2021-07-23

## 2021-07-23 RX ORDER — RIZATRIPTAN BENZOATE 10 MG/1
TABLET ORAL
Qty: 14 TABLET | Refills: 3 | Status: SHIPPED | OUTPATIENT
Start: 2021-07-23 | End: 2021-10-18

## 2021-07-23 RX ORDER — RIZATRIPTAN BENZOATE 10 MG/1
TABLET ORAL
Qty: 14 TABLET | Refills: 3 | Status: SHIPPED | OUTPATIENT
Start: 2021-07-23 | End: 2021-07-23

## 2021-07-23 RX ADMIN — MEBROFENIN 4.9 MCI.: 45 INJECTION, POWDER, LYOPHILIZED, FOR SOLUTION INTRAVENOUS at 08:09

## 2021-07-23 ASSESSMENT — PATIENT HEALTH QUESTIONNAIRE - PHQ9: 5. POOR APPETITE OR OVEREATING: SEVERAL DAYS

## 2021-07-23 ASSESSMENT — ANXIETY QUESTIONNAIRES
1. FEELING NERVOUS, ANXIOUS, OR ON EDGE: SEVERAL DAYS
7. FEELING AFRAID AS IF SOMETHING AWFUL MIGHT HAPPEN: SEVERAL DAYS
GAD7 TOTAL SCORE: 7
6. BECOMING EASILY ANNOYED OR IRRITABLE: SEVERAL DAYS
IF YOU CHECKED OFF ANY PROBLEMS ON THIS QUESTIONNAIRE, HOW DIFFICULT HAVE THESE PROBLEMS MADE IT FOR YOU TO DO YOUR WORK, TAKE CARE OF THINGS AT HOME, OR GET ALONG WITH OTHER PEOPLE: SOMEWHAT DIFFICULT
5. BEING SO RESTLESS THAT IT IS HARD TO SIT STILL: SEVERAL DAYS
3. WORRYING TOO MUCH ABOUT DIFFERENT THINGS: SEVERAL DAYS
2. NOT BEING ABLE TO STOP OR CONTROL WORRYING: SEVERAL DAYS

## 2021-07-23 ASSESSMENT — MIFFLIN-ST. JEOR: SCORE: 1939.68

## 2021-07-23 ASSESSMENT — PAIN SCALES - GENERAL: PAINLEVEL: NO PAIN (0)
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
## 2021-07-24 ASSESSMENT — ANXIETY QUESTIONNAIRES: GAD7 TOTAL SCORE: 7

## 2021-08-10 NOTE — TELEPHONE ENCOUNTER
Spoke with patient appointment scheduled 8/18/@ 2:20 Talked to Allan. It sounds like he would like us to start prior authorization prior to his next refill as it always happens this time of year.

## 2021-08-11 ENCOUNTER — TELEPHONE (OUTPATIENT)
Dept: PHYSICAL MEDICINE AND REHAB | Facility: CLINIC | Age: 31
End: 2021-08-11
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
## 2021-08-19 ENCOUNTER — TELEPHONE (OUTPATIENT)
Dept: FAMILY MEDICINE | Facility: CLINIC | Age: 31
End: 2021-08-19

## 2021-08-19 NOTE — TELEPHONE ENCOUNTER
Called and spoke to Don. Agree pt's symptoms concerning. Will hold 10 AM slot for tomorrow morning. Would potenitally coordinate outpatient CT if indicated and coordinate [facility] referral. Should go to ER with worsening.
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
## 2021-09-01 ENCOUNTER — OFFICE VISIT (OUTPATIENT)
Dept: FAMILY MEDICINE | Facility: CLINIC | Age: 31
End: 2021-09-01

## 2021-09-01 VITALS
BODY MASS INDEX: 28 KG/M2 | HEART RATE: 101 BPM | DIASTOLIC BLOOD PRESSURE: 80 MMHG | WEIGHT: 200 LBS | TEMPERATURE: 97.7 F | SYSTOLIC BLOOD PRESSURE: 118 MMHG | OXYGEN SATURATION: 98 % | HEIGHT: 71 IN
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
## 2021-09-01 DIAGNOSIS — R51.9 WORSENING HEADACHES: ICD-10-CM

## 2021-09-01 DIAGNOSIS — S09.90XA INJURY OF HEAD, INITIAL ENCOUNTER: ICD-10-CM

## 2021-09-01 PROCEDURE — 99213 OFFICE O/P EST LOW 20 MIN: CPT | Performed by: PHYSICIAN ASSISTANT

## 2021-09-01 RX ORDER — SILDENAFIL 50 MG/1
TABLET, FILM COATED ORAL
Qty: 30 TABLET | Refills: 1 | Status: SHIPPED | OUTPATIENT
Start: 2021-09-01 | End: 2022-05-02

## 2021-09-01 RX ORDER — DEXTROAMPHETAMINE SACCHARATE, AMPHETAMINE ASPARTATE, DEXTROAMPHETAMINE SULFATE AND AMPHETAMINE SULFATE 5; 5; 5; 5 MG/1; MG/1; MG/1; MG/1
40 TABLET ORAL DAILY
COMMUNITY
Start: 2021-08-31

## 2021-09-01 RX ORDER — BUPROPION HYDROCHLORIDE 300 MG/1
300 TABLET ORAL DAILY
COMMUNITY
Start: 2021-08-19 | End: 2024-03-20

## 2021-09-01 ASSESSMENT — MIFFLIN-ST. JEOR: SCORE: 1894.32
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
## 2021-09-02 ENCOUNTER — TELEPHONE (OUTPATIENT)
Dept: FAMILY MEDICINE | Facility: CLINIC | Age: 31
End: 2021-09-02

## 2021-09-02 NOTE — TELEPHONE ENCOUNTER
Called and left message for pt. Informed of negative head CT. Advised treat this as concussion versus exacerbations of migraines, Tylenol, rest, brain rest, sleep, avoid exertion. Contact me if not seeing improvement next week, or sooner if worsening. ER with significant worsening.

## 2021-09-21 ENCOUNTER — MYC MEDICAL ADVICE (OUTPATIENT)
Dept: FAMILY MEDICINE | Facility: CLINIC | Age: 31
End: 2021-09-21

## 2021-09-22 NOTE — TELEPHONE ENCOUNTER
This will need to wait for [facility] as I am not sure what this is all about-can you let pt know and make sure he is OK waiting

## 2021-10-18 ENCOUNTER — OFFICE VISIT (OUTPATIENT)
Dept: PHYSICAL MEDICINE AND REHAB | Facility: CLINIC | Age: 31
End: 2021-10-18
Payer: COMMERCIAL

## 2021-10-18 VITALS
HEIGHT: 72 IN | SYSTOLIC BLOOD PRESSURE: 130 MMHG | BODY MASS INDEX: 27.77 KG/M2 | DIASTOLIC BLOOD PRESSURE: 77 MMHG | HEART RATE: 85 BPM | OXYGEN SATURATION: 100 % | WEIGHT: 205 LBS

## 2021-10-18 DIAGNOSIS — G43.009 MIGRAINE WITHOUT AURA AND WITHOUT STATUS MIGRAINOSUS, NOT INTRACTABLE: ICD-10-CM

## 2021-10-18 DIAGNOSIS — S06.0X0D CONCUSSION WITHOUT LOSS OF CONSCIOUSNESS, SUBSEQUENT ENCOUNTER: Primary | ICD-10-CM

## 2021-10-18 DIAGNOSIS — G24.3 CERVICAL DYSTONIA: ICD-10-CM

## 2021-10-18 PROCEDURE — 64615 CHEMODENERV MUSC MIGRAINE: CPT | Performed by: PHYSICAL MEDICINE & REHABILITATION

## 2021-10-18 PROCEDURE — 99215 OFFICE O/P EST HI 40 MIN: CPT | Mod: 25 | Performed by: PHYSICAL MEDICINE & REHABILITATION

## 2021-10-18 RX ORDER — RIZATRIPTAN BENZOATE 10 MG/1
TABLET ORAL
Qty: 14 TABLET | Refills: 3 | Status: SHIPPED | OUTPATIENT
Start: 2021-10-18 | End: 2022-01-07

## 2021-10-18 ASSESSMENT — PAIN SCALES - GENERAL: PAINLEVEL: MODERATE PAIN (4)

## 2021-10-18 ASSESSMENT — MIFFLIN-ST. JEOR: SCORE: 1932.87
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
## 2021-10-19 ENCOUNTER — OFFICE VISIT (OUTPATIENT)
Dept: FAMILY MEDICINE | Facility: CLINIC | Age: 31
End: 2021-10-19

## 2021-10-19 VITALS
DIASTOLIC BLOOD PRESSURE: 74 MMHG | OXYGEN SATURATION: 97 % | TEMPERATURE: 98.5 F | WEIGHT: 210 LBS | SYSTOLIC BLOOD PRESSURE: 118 MMHG | HEIGHT: 72 IN | HEART RATE: 94 BPM | BODY MASS INDEX: 28.44 KG/M2

## 2021-10-19 DIAGNOSIS — J01.20 ACUTE NON-RECURRENT ETHMOIDAL SINUSITIS: Primary | ICD-10-CM

## 2021-10-19 PROCEDURE — 99213 OFFICE O/P EST LOW 20 MIN: CPT | Performed by: PHYSICIAN ASSISTANT

## 2021-10-19 ASSESSMENT — MIFFLIN-ST. JEOR: SCORE: 1955.55

## 2021-10-19 NOTE — NURSING NOTE
Chief Complaint   Patient presents with     Sinus Problem     sinus pain for 3 weeks-- been trying nasal rinse, tylenol, sidafed, warm compress       Pre-visit Screening:  Immunizations:  up to date  Colonoscopy:  NA  Mammogram: NA  Asthma Action Test/Plan:  NA  PHQ9:  NA  GAD7:  NA  Questioned patient about current smoking habits Pt. has never smoked.  Ok to leave detailed message on voice mail for today's visit only Yes, phone # [phone]

## 2021-10-24 ENCOUNTER — HEALTH MAINTENANCE LETTER (OUTPATIENT)
Age: 31
End: 2021-10-24

## 2021-10-27 ENCOUNTER — MYC MEDICAL ADVICE (OUTPATIENT)
Dept: FAMILY MEDICINE | Facility: CLINIC | Age: 31
End: 2021-10-27

## 2021-10-27 DIAGNOSIS — J01.20 ACUTE NON-RECURRENT ETHMOIDAL SINUSITIS: Primary | ICD-10-CM

## 2021-10-27 RX ORDER — LEVOFLOXACIN 500 MG/1
500 TABLET, FILM COATED ORAL DAILY
Qty: 10 TABLET | Refills: 0 | Status: SHIPPED | OUTPATIENT
Start: 2021-10-27 | End: 2021-11-06

## 2021-11-01 ENCOUNTER — MYC MEDICAL ADVICE (OUTPATIENT)
Dept: FAMILY MEDICINE | Facility: CLINIC | Age: 31
End: 2021-11-01

## 2021-11-02 NOTE — TELEPHONE ENCOUNTER
Patient is scheduled 11/8/2021 @ 10:40am with     [facility]  [address] [place] [address]  [phone] -- appt line  [phone] -- fax     Office note has been faxed    TAMY

## 2021-11-09 ENCOUNTER — TRANSFERRED RECORDS (OUTPATIENT)
Dept: FAMILY MEDICINE | Facility: CLINIC | Age: 31
End: 2021-11-09

## 2021-11-11 ENCOUNTER — TRANSFERRED RECORDS (OUTPATIENT)
Dept: FAMILY MEDICINE | Facility: CLINIC | Age: 31
End: 2021-11-11

## 2021-11-29 ENCOUNTER — TRANSFERRED RECORDS (OUTPATIENT)
Dept: FAMILY MEDICINE | Facility: CLINIC | Age: 31
End: 2021-11-29

## 2021-12-10 ENCOUNTER — OFFICE VISIT (OUTPATIENT)
Dept: FAMILY MEDICINE | Facility: CLINIC | Age: 31
End: 2021-12-10

## 2021-12-10 VITALS
SYSTOLIC BLOOD PRESSURE: 110 MMHG | HEIGHT: 72 IN | TEMPERATURE: 98.2 F | HEART RATE: 94 BPM | BODY MASS INDEX: 28.99 KG/M2 | WEIGHT: 214 LBS | DIASTOLIC BLOOD PRESSURE: 62 MMHG | OXYGEN SATURATION: 96 %

## 2021-12-10 DIAGNOSIS — F10.21 ALCOHOL DEPENDENCE IN REMISSION (H): ICD-10-CM

## 2021-12-10 DIAGNOSIS — J34.3 NASAL TURBINATE HYPERTROPHY: ICD-10-CM

## 2021-12-10 DIAGNOSIS — J34.2 DEVIATED NASAL SEPTUM: ICD-10-CM

## 2021-12-10 DIAGNOSIS — F43.21 ADJUSTMENT DISORDER WITH DEPRESSED MOOD: ICD-10-CM

## 2021-12-10 DIAGNOSIS — F90.0 ADHD (ATTENTION DEFICIT HYPERACTIVITY DISORDER), INATTENTIVE TYPE: ICD-10-CM

## 2021-12-10 DIAGNOSIS — Z01.818 PRE-OPERATIVE EXAMINATION: Primary | ICD-10-CM

## 2021-12-10 LAB
ERYTHROCYTE [DISTWIDTH] IN BLOOD BY AUTOMATED COUNT: 13 %
HCT VFR BLD AUTO: 45.5 % (ref 40–53)
HEMOGLOBIN: 15.1 G/DL (ref 13.3–17.7)
MCH RBC QN AUTO: 31.9 PG (ref 26–33)
MCHC RBC AUTO-ENTMCNC: 33.2 G/DL (ref 31–36)
MCV RBC AUTO: 96.1 FL (ref 78–100)
PLATELET COUNT - QUEST: 203 10^9/L (ref 150–375)
RBC # BLD AUTO: 4.74 10*12/L (ref 4.4–5.9)
WBC # BLD AUTO: 8 10*9/L (ref 4–11)

## 2021-12-10 PROCEDURE — 99214 OFFICE O/P EST MOD 30 MIN: CPT | Performed by: PHYSICIAN ASSISTANT

## 2021-12-10 PROCEDURE — 36415 COLL VENOUS BLD VENIPUNCTURE: CPT | Performed by: PHYSICIAN ASSISTANT

## 2021-12-10 PROCEDURE — 85027 COMPLETE CBC AUTOMATED: CPT | Performed by: PHYSICIAN ASSISTANT

## 2021-12-10 ASSESSMENT — MIFFLIN-ST. JEOR: SCORE: 1973.7

## 2021-12-10 NOTE — NURSING NOTE
Chief Complaint   Patient presents with     Pre-Op Exam     sinus and deviated septum surgery on 12/15/2021

## 2021-12-10 NOTE — PROGRESS NOTES
[facility]  [address]  [address]  [facility] [address]  Phone: [phone]  Fax: [phone]  Primary Provider: Madyson Gold  Pre-op Performing Provider: MADYSON GOLD      PREOPERATIVE EVALUATION:  Today's date: 12/10/2021    Allan Leon is a 31 year old male who presents for a preoperative evaluation.    Surgical Information:  Surgery/Procedure: Sinus and deviated septum surgery  Surgery Location: [facility]  Surgeon: Dr. Medley  Surgery Date: 12/15/2021  Time of Surgery: TBD  Where patient plans to recover: At home with family  Fax number for surgical facility: [phone]    Type of Anesthesia Anticipated: General    Assessment & Plan     The proposed surgical procedure is considered INTERMEDIATE risk.    Pre-operative examination  Nasal turbinate hypertrophy  Deviated nasal septum  - VENOUS COLLECTION  - Hemogram Platelet (BFP)      ADHD (attention deficit hyperactivity disorder), inattentive type    Adjustment disorder with depressed mood    Alcohol dependence in remission (H)      Risks and Recommendations:  The patient has the following additional risks and recommendations for perioperative complications:   - No identified additional risk factors other than previously addressed    Medication Instructions:  Patient is to take all scheduled medications on the day of surgery EXCEPT for modifications listed below:   Okay to hold Adderall/guanfacine on day of surgery. Wait to take Wellbutrin on day of surgery until after surgery.     RECOMMENDATION:  APPROVAL GIVEN to proceed with proposed procedure, without further diagnostic evaluation.    20 minutes spent on the date of the encounter doing chart review, review of outside records, review of test results, interpretation of tests, patient visit and documentation         Subjective     HPI related to upcoming procedure: Worsening sinus congestion, PND for several years. CT showed  deviated septum, hypertrophied turbinates. Plan is for surgical correction.     1. No - Have you ever had a heart attack or stroke?  2. No - Have you ever had surgery on your heart or blood vessels, such as a stent, coronary (heart) bypass, or surgery on an artery in the head, neck, heart, or legs?  3. No - Do you have chest pain when you are physically active?  4. No - Do you have a history of heart failure?  5. No - Do you currently have a cold, bronchitis, or symptoms of other respiratory (head and chest) infections?  6. No - Do you have a cough, shortness of breath, or wheezing?  7. No - Do you or anyone in your family have a history of blood clots?  8. No - Do you or anyone in your family have a serious bleeding problem, such as long-lasting bleeding after surgeries or cuts?  9. No - Have you ever had anemia or been told to take iron pills?  10. No - Have you had any abnormal blood loss such as black, tarry or bloody stools, or abnormal vaginal bleeding?  11. No - Have you ever had a blood transfusion?  12. Yes - Are you willing to have a blood transfusion if it is medically needed before, during, or after your surgery?  13. No - Have you or anyone in your family ever had problems with anesthesia (sedation for surgery)?  14. No - Do you have sleep apnea, excessive snoring, or daytime drowsiness?   15. No - Do you have any artifical heart valves or other implanted medical devices, such as a pacemaker, defibrillator, or continuous glucose monitor?  16. No - Do you have any artifical joints?  17. No - Are you allergic to latex?  18. No - Is there any chance that you may be pregnant?      Health Care Directive:  Patient does not have a Health Care Directive or Living Will: Discussed advance care planning with patient; however, patient declined at this time.    Preoperative Review of :   reviewed - no record of controlled substances prescribed.  {Review MNPMP for all patients per [facility] MNPMP Profile    Status  of Chronic Conditions:  See problem list for active medical problems.  Problems all longstanding and stable, except as noted/documented.  See ROS for pertinent symptoms related to these conditions.    Review of Systems  Constitutional, neuro, ENT, endocrine, pulmonary, cardiac, gastrointestinal, genitourinary, musculoskeletal, integument and psychiatric systems are negative, except as otherwise noted.    Patient Active Problem List    Diagnosis Date Noted     Suicidal ideation 11/12/2020     Priority: Medium     Hallucinations 10/15/2020     Priority: Medium     Epigastric pain 10/15/2020     Priority: Medium     Erectile dysfunction, unspecified erectile dysfunction type 08/18/2020     Priority: Medium     ADHD (attention deficit hyperactivity disorder), inattentive type 08/18/2020     Priority: Medium     Post-concussion headache 08/18/2020     Priority: Medium     History of asthma 02/26/2019     Priority: Medium     Alcohol dependence in remission (H) 07/10/2018     Priority: Medium     Other chronic rhinitis 07/27/2017     Priority: Medium     Acne vulgaris 08/05/2016     Priority: Medium     Other eczema 08/05/2016     Priority: Medium     ACP (advance care planning) 02/17/2016     Priority: Medium     Advance Care Planning 2/17/2016: ACP Review of Chart / Resources Provided:  Reviewed chart for advance care plan.  Allan Leon has no plan or code status on file. Discussed available resources and provided with information. Confirmed code status reflects current choices pending further ACP discussions.  Confirmed/documented legally designated decision makers.  Added by [facility] 06/15/2015     Priority: Medium     History anabolic steroid use 06/01/2015     Priority: Medium     ADHD (attention deficit hyperactivity disorder), combined type 12/18/2014     Priority: Medium     Records from [facility] indicate work up- meds included ritalin and adderall, dx 2001    Patient is  followed by [facility] for ongoing prescription of narcotic pain medicine.  Med: .   Maximum use per month: 60  Expected duration:   Narcotic agreement on file: YES  Clinic visit recommended: Q 6  months    Problem list name updated by automated process. Provider to review       Orthostatic hypotension 01/18/2013     Priority: Medium     Convulsions (H) 12/07/2011     Priority: Medium     Cerebral cysts 12/07/2011     Priority: Medium      Past Medical History:   Diagnosis Date     Asthma 1/1/2010     Past Surgical History:   Procedure Laterality Date     APPENDECTOMY  11/01/2011     COSMETIC SURGERY  4/21/1995    Birth rin removal     PYLOROTOMY  1992     Current Outpatient Medications   Medication Sig Dispense Refill     amphetamine-dextroamphetamine (ADDERALL XR) 30 MG 24 hr capsule Take 1 capsule (30 mg) by mouth 2 times daily 60 capsule 0     amphetamine-dextroamphetamine (ADDERALL) 10 MG tablet        buPROPion (WELLBUTRIN XL) 300 MG 24 hr tablet Take 300 mg by mouth daily       guanFACINE (TENEX) 1 MG tablet Take 1 mg by mouth daily       meloxicam (MOBIC) 15 MG tablet TAKE 1 TABLET BY MOUTH EVERY DAY WITH MEALS       rizatriptan (MAXALT) 10 MG tablet rizatriptan 10 mg tablet  take 1 tablet (10 mg) by oral route once, may repeat at 2 hour intervals; do not exceed 30 mg in 24 hours 14 tablet 3     sildenafil (VIAGRA) 50 MG tablet Take 0.5-1 tablets by mouth 1 hour prior to intercourse. Max dose: 2 tabs/24 hours. Do not take with doxazosin, tamsulosin, nitroglycerin. 30 tablet 1     traZODone (DESYREL) 50 MG tablet Take 1 tablet (50 mg) by mouth nightly as needed for sleep 30 tablet 3     valACYclovir (VALTREX) 1000 mg tablet Take 1 tablet (1,000 mg) by mouth 2 times daily for 10 days 20 tablet 0       Allergies   Allergen Reactions     Nickel Rash     Tape [Adhesive Tape] Rash        Social History     Tobacco Use     Smoking status: Never Smoker     Smokeless tobacco: Former User      Types: Chew   Substance Use Topics     Alcohol use: Not Currently     Alcohol/week: 0.0 - 4.0 standard drinks     Comment: 1-2 days week/4 drinks/day     Family History   Problem Relation Age of Onset     Diabetes No family hx of      Hypertension No family hx of      Hyperlipidemia No family hx of      History   Drug Use No     Comment: Marijuana a couple times a month         Objective     /62 (BP Location: Left arm, Patient Position: Sitting, Cuff Size: Adult Large)   Pulse 94   Temp 98.2  F (36.8  C) (Temporal)   Ht 1.829 m (6')   Wt 97.1 kg (214 lb)   SpO2 96%   BMI 29.02 kg/m      Physical Exam    GENERAL APPEARANCE: healthy, alert and no distress     EYES: EOMI,  PERRL     HENT: ear canals and TM's normal and nose and mouth without ulcers or lesions     NECK: no adenopathy, no asymmetry, masses, or scars and thyroid normal to palpation     RESP: lungs clear to auscultation - no rales, rhonchi or wheezes     CV: regular rates and rhythm, normal S1 S2, no S3 or S4 and no murmur, click or rub     ABDOMEN:  soft, nontender, no HSM or masses and bowel sounds normal     MS: extremities normal- no gross deformities noted, no evidence of inflammation in joints, FROM in all extremities.     SKIN: no suspicious lesions or rashes     NEURO: Normal strength and tone, sensory exam grossly normal, mentation intact and speech normal     PSYCH: mentation appears normal. and affect normal/bright     LYMPHATICS: No cervical adenopathy    Diagnostics:  Recent Results (from the past 24 hour(s))   Hemogram Platelet (BFP)    Collection Time: 12/10/21 11:44 AM   Result Value Ref Range    WBC 8.0 4.0 - 11 10*9/L    RBC Count 4.74 4.4 - 5.9 10*12/L    Hemoglobin 15.1 13.3 - 17.7 g/dL    Hematocrit 45.5 40.0 - 53.0 %    MCV 96.1 78 - 100 fL    MCH 31.9 26 - 33 pg    MCHC 33.2 31 - 36 g/dL    RDW 13.0 %    Platelet Count 203 150 - 375 10^9/L      No EKG required, no history of coronary heart disease, significant arrhythmia,  peripheral arterial disease or other structural heart disease.    Revised Cardiac Risk Index (RCRI):  The patient has the following serious cardiovascular risks for perioperative complications:   - No serious cardiac risks = 0 points     RCRI Interpretation: 1 point: Class II (low risk - 0.9% complication rate)       Signed Electronically by: Madyson Gay PA-C  Copy of this evaluation report is provided to requesting physician.

## 2021-12-10 NOTE — LETTER
[facility]YSIC[facility]  [address]  [address]  [facility] [address]  Phone: [phone]  Fax: [phone]  Primary Provider: Madyson Gold  Pre-op Performing Provider: MADYSON GOLD      PREOPERATIVE EVALUATION:  Today's date: 12/10/2021    Allan Leon is a 31 year old male who presents for a preoperative evaluation.    Surgical Information:  Surgery/Procedure: Sinus and deviated septum surgery  Surgery Location: [facility]  Surgeon: Dr. Medley  Surgery Date: 12/15/2021  Time of Surgery: TBD  Where patient plans to recover: At home with family  Fax number for surgical facility: [phone]    Type of Anesthesia Anticipated: General    Assessment & Plan     The proposed surgical procedure is considered INTERMEDIATE risk.    Pre-operative examination  Nasal turbinate hypertrophy  Deviated nasal septum  - VENOUS COLLECTION  - Hemogram Platelet (BFP)      ADHD (attention deficit hyperactivity disorder), inattentive type    Adjustment disorder with depressed mood    Alcohol dependence in remission (H)      Risks and Recommendations:  The patient has the following additional risks and recommendations for perioperative complications:   - No identified additional risk factors other than previously addressed    Medication Instructions:  Patient is to take all scheduled medications on the day of surgery EXCEPT for modifications listed below:   Okay to hold Adderall/guanfacine on day of surgery. Wait to take Wellbutrin on day of surgery until after surgery.     RECOMMENDATION:  APPROVAL GIVEN to proceed with proposed procedure, without further diagnostic evaluation.    20 minutes spent on the date of the encounter doing chart review, review of outside records, review of test results, interpretation of tests, patient visit and documentation         Subjective     HPI related to upcoming procedure: Worsening sinus congestion, PND for several years. CT showed  deviated septum, hypertrophied turbinates. Plan is for surgical correction.     1. No - Have you ever had a heart attack or stroke?  2. No - Have you ever had surgery on your heart or blood vessels, such as a stent, coronary (heart) bypass, or surgery on an artery in the head, neck, heart, or legs?  3. No - Do you have chest pain when you are physically active?  4. No - Do you have a history of heart failure?  5. No - Do you currently have a cold, bronchitis, or symptoms of other respiratory (head and chest) infections?  6. No - Do you have a cough, shortness of breath, or wheezing?  7. No - Do you or anyone in your family have a history of blood clots?  8. No - Do you or anyone in your family have a serious bleeding problem, such as long-lasting bleeding after surgeries or cuts?  9. No - Have you ever had anemia or been told to take iron pills?  10. No - Have you had any abnormal blood loss such as black, tarry or bloody stools, or abnormal vaginal bleeding?  11. No - Have you ever had a blood transfusion?  12. Yes - Are you willing to have a blood transfusion if it is medically needed before, during, or after your surgery?  13. No - Have you or anyone in your family ever had problems with anesthesia (sedation for surgery)?  14. No - Do you have sleep apnea, excessive snoring, or daytime drowsiness?   15. No - Do you have any artifical heart valves or other implanted medical devices, such as a pacemaker, defibrillator, or continuous glucose monitor?  16. No - Do you have any artifical joints?  17. No - Are you allergic to latex?  18. No - Is there any chance that you may be pregnant?      Health Care Directive:  Patient does not have a Health Care Directive or Living Will: Discussed advance care planning with patient; however, patient declined at this time.    Preoperative Review of :   reviewed - no record of controlled substances prescribed.  {Review MNPMP for all patients per [facility] MNPMP Profile    Status  of Chronic Conditions:  See problem list for active medical problems.  Problems all longstanding and stable, except as noted/documented.  See ROS for pertinent symptoms related to these conditions.    Review of Systems  Constitutional, neuro, ENT, endocrine, pulmonary, cardiac, gastrointestinal, genitourinary, musculoskeletal, integument and psychiatric systems are negative, except as otherwise noted.    Patient Active Problem List    Diagnosis Date Noted     Suicidal ideation 11/12/2020     Priority: Medium     Hallucinations 10/15/2020     Priority: Medium     Epigastric pain 10/15/2020     Priority: Medium     Erectile dysfunction, unspecified erectile dysfunction type 08/18/2020     Priority: Medium     ADHD (attention deficit hyperactivity disorder), inattentive type 08/18/2020     Priority: Medium     Post-concussion headache 08/18/2020     Priority: Medium     History of asthma 02/26/2019     Priority: Medium     Alcohol dependence in remission (H) 07/10/2018     Priority: Medium     Other chronic rhinitis 07/27/2017     Priority: Medium     Acne vulgaris 08/05/2016     Priority: Medium     Other eczema 08/05/2016     Priority: Medium     ACP (advance care planning) 02/17/2016     Priority: Medium     Advance Care Planning 2/17/2016: ACP Review of Chart / Resources Provided:  Reviewed chart for advance care plan.  Allan Leon has no plan or code status on file. Discussed available resources and provided with information. Confirmed code status reflects current choices pending further ACP discussions.  Confirmed/documented legally designated decision makers.  Added by [facility] 06/15/2015     Priority: Medium     History anabolic steroid use 06/01/2015     Priority: Medium     ADHD (attention deficit hyperactivity disorder), combined type 12/18/2014     Priority: Medium     Records from [facility] indicate work up- meds included ritalin and adderall, dx 2001    Patient is  followed by [facility] for ongoing prescription of narcotic pain medicine.  Med: .   Maximum use per month: 60  Expected duration:   Narcotic agreement on file: YES  Clinic visit recommended: Q 6  months    Problem list name updated by automated process. Provider to review       Orthostatic hypotension 01/18/2013     Priority: Medium     Convulsions (H) 12/07/2011     Priority: Medium     Cerebral cysts 12/07/2011     Priority: Medium      Past Medical History:   Diagnosis Date     Asthma 1/1/2010     Past Surgical History:   Procedure Laterality Date     APPENDECTOMY  11/01/2011     COSMETIC SURGERY  4/21/1995    Birth rin removal     PYLOROTOMY  1992     Current Outpatient Medications   Medication Sig Dispense Refill     amphetamine-dextroamphetamine (ADDERALL XR) 30 MG 24 hr capsule Take 1 capsule (30 mg) by mouth 2 times daily 60 capsule 0     amphetamine-dextroamphetamine (ADDERALL) 10 MG tablet        buPROPion (WELLBUTRIN XL) 300 MG 24 hr tablet Take 300 mg by mouth daily       guanFACINE (TENEX) 1 MG tablet Take 1 mg by mouth daily       meloxicam (MOBIC) 15 MG tablet TAKE 1 TABLET BY MOUTH EVERY DAY WITH MEALS       rizatriptan (MAXALT) 10 MG tablet rizatriptan 10 mg tablet  take 1 tablet (10 mg) by oral route once, may repeat at 2 hour intervals; do not exceed 30 mg in 24 hours 14 tablet 3     sildenafil (VIAGRA) 50 MG tablet Take 0.5-1 tablets by mouth 1 hour prior to intercourse. Max dose: 2 tabs/24 hours. Do not take with doxazosin, tamsulosin, nitroglycerin. 30 tablet 1     traZODone (DESYREL) 50 MG tablet Take 1 tablet (50 mg) by mouth nightly as needed for sleep 30 tablet 3     valACYclovir (VALTREX) 1000 mg tablet Take 1 tablet (1,000 mg) by mouth 2 times daily for 10 days 20 tablet 0       Allergies   Allergen Reactions     Nickel Rash     Tape [Adhesive Tape] Rash        Social History     Tobacco Use     Smoking status: Never Smoker     Smokeless tobacco: Former User      Types: Chew   Substance Use Topics     Alcohol use: Not Currently     Alcohol/week: 0.0 - 4.0 standard drinks     Comment: 1-2 days week/4 drinks/day     Family History   Problem Relation Age of Onset     Diabetes No family hx of      Hypertension No family hx of      Hyperlipidemia No family hx of      History   Drug Use No     Comment: Marijuana a couple times a month         Objective     /62 (BP Location: Left arm, Patient Position: Sitting, Cuff Size: Adult Large)   Pulse 94   Temp 98.2  F (36.8  C) (Temporal)   Ht 1.829 m (6')   Wt 97.1 kg (214 lb)   SpO2 96%   BMI 29.02 kg/m      Physical Exam    GENERAL APPEARANCE: healthy, alert and no distress     EYES: EOMI,  PERRL     HENT: ear canals and TM's normal and nose and mouth without ulcers or lesions     NECK: no adenopathy, no asymmetry, masses, or scars and thyroid normal to palpation     RESP: lungs clear to auscultation - no rales, rhonchi or wheezes     CV: regular rates and rhythm, normal S1 S2, no S3 or S4 and no murmur, click or rub     ABDOMEN:  soft, nontender, no HSM or masses and bowel sounds normal     MS: extremities normal- no gross deformities noted, no evidence of inflammation in joints, FROM in all extremities.     SKIN: no suspicious lesions or rashes     NEURO: Normal strength and tone, sensory exam grossly normal, mentation intact and speech normal     PSYCH: mentation appears normal. and affect normal/bright     LYMPHATICS: No cervical adenopathy    Diagnostics:  Recent Results (from the past 24 hour(s))   Hemogram Platelet (BFP)    Collection Time: 12/10/21 11:44 AM   Result Value Ref Range    WBC 8.0 4.0 - 11 10*9/L    RBC Count 4.74 4.4 - 5.9 10*12/L    Hemoglobin 15.1 13.3 - 17.7 g/dL    Hematocrit 45.5 40.0 - 53.0 %    MCV 96.1 78 - 100 fL    MCH 31.9 26 - 33 pg    MCHC 33.2 31 - 36 g/dL    RDW 13.0 %    Platelet Count 203 150 - 375 10^9/L      No EKG required, no history of coronary heart disease, significant arrhythmia,  peripheral arterial disease or other structural heart disease.    Revised Cardiac Risk Index (RCRI):  The patient has the following serious cardiovascular risks for perioperative complications:   - No serious cardiac risks = 0 points     RCRI Interpretation: 1 point: Class II (low risk - 0.9% complication rate)       Signed Electronically by: Madyson Gay PA-C  Copy of this evaluation report is provided to requesting physician.

## 2022-01-07 ENCOUNTER — OFFICE VISIT (OUTPATIENT)
Dept: PHYSICAL MEDICINE AND REHAB | Facility: CLINIC | Age: 32
End: 2022-01-07
Payer: COMMERCIAL

## 2022-01-07 VITALS
DIASTOLIC BLOOD PRESSURE: 78 MMHG | BODY MASS INDEX: 29.4 KG/M2 | SYSTOLIC BLOOD PRESSURE: 119 MMHG | WEIGHT: 210 LBS | OXYGEN SATURATION: 97 % | HEART RATE: 95 BPM | HEIGHT: 71 IN

## 2022-01-07 DIAGNOSIS — G24.3 CERVICAL DYSTONIA: ICD-10-CM

## 2022-01-07 DIAGNOSIS — Z86.69 HX OF MIGRAINES: ICD-10-CM

## 2022-01-07 DIAGNOSIS — G43.009 MIGRAINE WITHOUT AURA AND WITHOUT STATUS MIGRAINOSUS, NOT INTRACTABLE: ICD-10-CM

## 2022-01-07 DIAGNOSIS — S06.0X0D CONCUSSION WITHOUT LOSS OF CONSCIOUSNESS, SUBSEQUENT ENCOUNTER: Primary | ICD-10-CM

## 2022-01-07 PROCEDURE — 99215 OFFICE O/P EST HI 40 MIN: CPT | Mod: 25 | Performed by: PHYSICAL MEDICINE & REHABILITATION

## 2022-01-07 PROCEDURE — 64615 CHEMODENERV MUSC MIGRAINE: CPT | Mod: 59 | Performed by: PHYSICAL MEDICINE & REHABILITATION

## 2022-01-07 PROCEDURE — 96372 THER/PROPH/DIAG INJ SC/IM: CPT | Performed by: PHYSICAL MEDICINE & REHABILITATION

## 2022-01-07 RX ORDER — RIZATRIPTAN BENZOATE 10 MG/1
TABLET ORAL
Qty: 14 TABLET | Refills: 3 | Status: SHIPPED | OUTPATIENT
Start: 2022-01-07 | End: 2022-09-16

## 2022-01-07 ASSESSMENT — PAIN SCALES - GENERAL: PAINLEVEL: MILD PAIN (3)

## 2022-01-07 ASSESSMENT — MIFFLIN-ST. JEOR: SCORE: 1939.68
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
## 2022-01-07 NOTE — NURSING NOTE
"Chief Complaint   Patient presents with     Head Injury     concussion Multiple 2017 - 2019 and 5 / 2021 from a work injury     Botox       Vitals:    01/07/22 1326   BP: 119/78   Pulse: 95   SpO2: 97%   Weight: 95.3 kg (210 lb)   Height: 1.803 m (5' 11\")       Body mass index is 29.29 kg/m .                            "

## 2022-01-28 ENCOUNTER — OFFICE VISIT (OUTPATIENT)
Dept: FAMILY MEDICINE | Facility: CLINIC | Age: 32
End: 2022-01-28

## 2022-01-28 VITALS
HEIGHT: 72 IN | HEART RATE: 96 BPM | SYSTOLIC BLOOD PRESSURE: 116 MMHG | DIASTOLIC BLOOD PRESSURE: 72 MMHG | OXYGEN SATURATION: 99 % | BODY MASS INDEX: 29.53 KG/M2 | WEIGHT: 218 LBS | TEMPERATURE: 97.2 F

## 2022-01-28 DIAGNOSIS — L20.9 ATOPIC DERMATITIS, UNSPECIFIED TYPE: ICD-10-CM

## 2022-01-28 DIAGNOSIS — K04.7 TOOTH INFECTION: Primary | ICD-10-CM

## 2022-01-28 DIAGNOSIS — F32.1 MODERATE MAJOR DEPRESSION (H): ICD-10-CM

## 2022-01-28 DIAGNOSIS — S06.9X9S TRAUMATIC BRAIN INJURY WITH LOSS OF CONSCIOUSNESS, SEQUELA (H): ICD-10-CM

## 2022-01-28 PROCEDURE — 99214 OFFICE O/P EST MOD 30 MIN: CPT | Performed by: PHYSICIAN ASSISTANT

## 2022-01-28 RX ORDER — AMOXICILLIN 500 MG/1
CAPSULE ORAL
COMMUNITY
Start: 2022-01-26 | End: 2022-01-30

## 2022-01-28 RX ORDER — TRIAMCINOLONE ACETONIDE 1 MG/G
CREAM TOPICAL 2 TIMES DAILY
Qty: 30 G | Refills: 0 | Status: SHIPPED | OUTPATIENT
Start: 2022-01-28 | End: 2022-02-11

## 2022-01-28 RX ORDER — GABAPENTIN 100 MG/1
100-200 CAPSULE ORAL
Qty: 30 CAPSULE | Refills: 0 | Status: SHIPPED | OUTPATIENT
Start: 2022-01-28 | End: 2022-06-23

## 2022-01-28 RX ORDER — IBUPROFEN 800 MG/1
TABLET, FILM COATED ORAL
COMMUNITY
Start: 2022-01-26 | End: 2022-09-16

## 2022-01-28 ASSESSMENT — MIFFLIN-ST. JEOR: SCORE: 1991.84

## 2022-01-28 NOTE — NURSING NOTE
Chief Complaint   Patient presents with     Derm Problem     pt has rash and a spot on right side of his face that was pimple-like but when he popped this, it was a brown discharge.     Mouth Problem     pt had a root canal procedure 2 days ago, prescribed amoxicilin and feels that the right side of his mouth may be infected       Pre-visit Screening:  Immunizations:  up to date  Colonoscopy:  is up to date  Mammogram: is up to date  Asthma Action Test/Plan:  NA  PHQ9:  UTD  GAD7:  UTD  Questioned patient about current smoking habits Pt. has never smoked.  Ok to leave detailed message on voice mail for today's visit only Yes, phone # [phone]

## 2022-01-28 NOTE — PROGRESS NOTES
CC: Multiple concerns    History:  Tooth pain:  Had tooth fillings done 3 weeks, and was getting pain starting 4 weeks ago. Fillings were supposed to correct pain. Since that time pain has been getting worse and worse, so went back to dentist 3 days ago. Repeated x-ray and saw infection in tooth (lower right molar). Started him on amoxicillin 500 mg 3 times daily for 7 days. Since that time, has been taking amoxicillin and pain has increased. Has contacted his dentist, but hasn't heard back from them. Has been taking ibuprofen without relief.     Skin change:  Around 4 weeks ago as well, started to see small pimple forming on right cheek. He popped this and black/brown milky discharge came out. Since that time, has been getting worsening pain above right ear. There is pain spanning all the way down from right ear to right jaw.  Works as , and this has been difficult.     Eczema:  Allan also mentions several areas on his skin that get red and itching. Uses hydrocortisone cream which helps somewhat.     Acne:  Allan also feels like he has gotten more chest and back acne since being on non-prescribed steroids.    Headaches, history of TBI:  Continues to work with headache specialist. Pain in right jaw feels different that his head pain.     Depression, ADHD:  Works with psychiatry to manage depression and ADHD. Takes Wellbutrin and Adderall XR and IR as well as guanfacine.     PMH, MEDICATIONS, ALLERGIES, SOCIAL AND FAMILY HISTORY in [facility] and reviewed by me personally.    ROS negative other than the symptoms noted above in the HPI.      Examination   /72 (BP Location: Right arm, Patient Position: Sitting, Cuff Size: Adult Large)   Pulse 96   Temp 97.2  F (36.2  C) (Temporal)   Ht 1.829 m (6')   Wt 98.9 kg (218 lb)   SpO2 99%   BMI 29.57 kg/m       Constitutional: Sitting comfortably, in no acute distress. Vital signs noted  Eyes: pupils equal round reactive to light and accomodation, extra  ocular movements intact  Ears: external canals and TMs free of abnormalities  Nose: patent, without mucosal abnormalities  Mouth and throat: without erythema or lesions of the mucosa  Neck:  no adenopathy, trachea midline and normal to palpation, thyroid normal to palpation  Cardiovascular:  regular rate and rhythm, no murmurs, clicks, or gallops  Respiratory:  normal respiratory rate and rhythm, lungs clear to auscultation  M/S: Mild tenderness to palpation over right TMJ.   NEURO:  speech normal, mental status intact, cranial nerves 2-12 intact, muscle strength normal, rapid alternating movements normal, reflexes normal and symmetric  SKIN: No jaundice/pallor/rash. Small erythematous cystic lesion on right cheek. Fine scaling.   Psychiatric: mentation appears normal and affect normal/bright        A/P    ICD-10-CM    1. Tooth infection  K04.7 amoxicillin-clavulanate (AUGMENTIN) 875-125 MG tablet     gabapentin (NEURONTIN) 100 MG capsule   2. Atopic dermatitis, unspecified type  L20.9 triamcinolone (KENALOG) 0.1 % external cream   3. Moderate major depression (H)  F32.1    4. Traumatic brain injury with loss of consciousness, sequela (H)  S06.9X9S        DISCUSSION:  Tooth pain:  Ideally pt will hear back from dentist with their next recommendation given that they have diagnosed and treated him with an infection. I suspect tooth and facial pain all related to tooth infection. Will change to Augmentin antibiotic. Warned of side effects. Also can try apply heat over right TMJ to help with muscle tension.  Continue to use ibuprofen, but take Tylenol in between. Did prescribe gabapentin to use if needed. Warned of side effects, drowsiness.     Skin change:  No concerns with healing skin lesion. Contact me in 2 weeks if not resolved.     Eczema:  Prescribed triamcinolone cream to use twice daily as needed for up to 14 days. Warned of possible side effects of topical steroid therapy, and advised to monitor closely for  possibly permanent skin changes like thinning, whitening of the skin, and stop immediately if noted.     Acne:  Recommended trial of benzoyl peroxide or salicylic acid.     Headaches, history of TBI:  Continue working with neurologist. Suspect current facial pain is unrelated to chronic headaches.     Depression, ADHD:  Continue working with psychiatrist. Stable.     follow up visit: As needed    Time of visit: 35 minutes    Madyson Gay PA-C  [facility]

## 2022-01-30 PROBLEM — F32.1 MODERATE MAJOR DEPRESSION (H): Status: ACTIVE | Noted: 2022-01-30

## 2022-01-30 PROBLEM — S06.9X9S TRAUMATIC BRAIN INJURY WITH LOSS OF CONSCIOUSNESS, SEQUELA (H): Status: ACTIVE | Noted: 2022-01-30

## 2022-01-30 PROBLEM — R10.13 EPIGASTRIC PAIN: Status: RESOLVED | Noted: 2020-10-15 | Resolved: 2022-01-30

## 2022-02-09 NOTE — TELEPHONE ENCOUNTER
Received a refill request for gabapentin. Last filled for qty 30 on 1/28/22. Pt takes this PRN and should have enough for awhile so I denied the request. He can reach out when this is due for a refill.

## 2022-02-14 DIAGNOSIS — G44.309 POST-CONCUSSION HEADACHE: Primary | ICD-10-CM

## 2022-02-14 RX ORDER — PROPRANOLOL HCL 60 MG
60 CAPSULE, EXTENDED RELEASE 24HR ORAL DAILY
Qty: 30 CAPSULE | Refills: 3 | Status: SHIPPED | OUTPATIENT
Start: 2022-02-14 | End: 2022-09-16

## 2022-02-14 NOTE — TELEPHONE ENCOUNTER
[facility] Call Center    Phone Message    May a detailed message be left on voicemail: yes     Reason for Call: Medication Refill Request    Has the patient contacted the pharmacy for the refill? Yes   Name of medication being requested: propanolol 60 mg  Provider who prescribed the medication: Rita   Pharmacy: [facility]  Date medication is needed: ASAP       Action Taken: Message routed to:  [facility] ([facility]): physical medicine and rehab    Travel Screening: Not Applicable

## 2022-04-01 ENCOUNTER — OFFICE VISIT (OUTPATIENT)
Dept: PHYSICAL MEDICINE AND REHAB | Facility: CLINIC | Age: 32
End: 2022-04-01
Payer: COMMERCIAL

## 2022-04-01 VITALS
RESPIRATION RATE: 16 BRPM | BODY MASS INDEX: 30.79 KG/M2 | HEART RATE: 93 BPM | SYSTOLIC BLOOD PRESSURE: 140 MMHG | OXYGEN SATURATION: 100 % | WEIGHT: 227 LBS | DIASTOLIC BLOOD PRESSURE: 86 MMHG

## 2022-04-01 DIAGNOSIS — G44.309 POST-CONCUSSION HEADACHE: ICD-10-CM

## 2022-04-01 DIAGNOSIS — G24.3 CERVICAL DYSTONIA: Primary | ICD-10-CM

## 2022-04-01 DIAGNOSIS — G43.009 MIGRAINE WITHOUT AURA AND WITHOUT STATUS MIGRAINOSUS, NOT INTRACTABLE: ICD-10-CM

## 2022-04-01 DIAGNOSIS — S06.0X0D CONCUSSION WITHOUT LOSS OF CONSCIOUSNESS, SUBSEQUENT ENCOUNTER: ICD-10-CM

## 2022-04-01 PROCEDURE — 95874 GUIDE NERV DESTR NEEDLE EMG: CPT | Mod: GC | Performed by: PHYSICAL MEDICINE & REHABILITATION

## 2022-04-01 PROCEDURE — 64616 CHEMODENERV MUSC NECK DYSTON: CPT | Mod: 59 | Performed by: PHYSICAL MEDICINE & REHABILITATION

## 2022-04-01 PROCEDURE — 96372 THER/PROPH/DIAG INJ SC/IM: CPT | Mod: GC | Performed by: PHYSICAL MEDICINE & REHABILITATION

## 2022-04-01 PROCEDURE — 99215 OFFICE O/P EST HI 40 MIN: CPT | Mod: 25 | Performed by: PHYSICAL MEDICINE & REHABILITATION

## 2022-04-01 ASSESSMENT — PAIN SCALES - GENERAL: PAINLEVEL: NO PAIN (0)
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
## 2022-04-10 ENCOUNTER — HEALTH MAINTENANCE LETTER (OUTPATIENT)
Age: 32
End: 2022-04-10

## 2022-05-02 ENCOUNTER — OFFICE VISIT (OUTPATIENT)
Dept: FAMILY MEDICINE | Facility: CLINIC | Age: 32
End: 2022-05-02

## 2022-05-02 VITALS
TEMPERATURE: 98.2 F | HEART RATE: 94 BPM | OXYGEN SATURATION: 97 % | BODY MASS INDEX: 31.22 KG/M2 | WEIGHT: 223 LBS | DIASTOLIC BLOOD PRESSURE: 78 MMHG | SYSTOLIC BLOOD PRESSURE: 118 MMHG | HEIGHT: 71 IN

## 2022-05-02 DIAGNOSIS — J32.9 SINUSITIS, UNSPECIFIED CHRONICITY, UNSPECIFIED LOCATION: Primary | ICD-10-CM

## 2022-05-02 LAB — COVID-19: NEGATIVE

## 2022-05-02 PROCEDURE — G2023 SPECIMEN COLLECT COVID-19: HCPCS | Performed by: PHYSICIAN ASSISTANT

## 2022-05-02 PROCEDURE — 87635 SARS-COV-2 COVID-19 AMP PRB: CPT | Performed by: PHYSICIAN ASSISTANT

## 2022-05-02 PROCEDURE — 99213 OFFICE O/P EST LOW 20 MIN: CPT | Performed by: PHYSICIAN ASSISTANT

## 2022-05-02 RX ORDER — SILDENAFIL 100 MG/1
TABLET, FILM COATED ORAL
COMMUNITY
Start: 2022-02-14

## 2022-05-02 NOTE — NURSING NOTE
Chief Complaint   Patient presents with     Sinus Problem     Sinus congestion, headache, right side of his face is swollen-- sx for 3 days     Headache         Pre-visit Screening:  Immunizations:  up to date  Colonoscopy:  NA  Mammogram: NA  Asthma Action Test/Plan:  NA  PHQ9:  NA  GAD7:  NA  Questioned patient about current smoking habits Pt. smokes 5 cigerettes a day  Ok to leave detailed message on voice mail for today's visit only Yes, phone # [phone]

## 2022-05-02 NOTE — PROGRESS NOTES
"  Assessment & Plan     Sinusitis, unspecified chronicity, unspecified location-symptoms have only lasted for 3 days, will wait and treat symptoms before using antibiotic  Start Flonase 2 puffs in each nostril daily  Start Sudafed and ibuprofen to help control symptoms  Netti pot/saline spray to help clear out nasal passages  Contact Friday if not improved-will consider antibiotic course if symptoms persist  Increase water intake  - COVID-19 ([facility])-NEGATIVE         Follow up Friday if not improved.    No follow-ups on file.    Lee Leger PA-C  [facility]    Minh Lemus is a 31 year old who presents for the following health issues     HPI     Had deviated septum surgery in December-treated for infection at that time.  Saw dentist 1 month ago-root canal at that time. No teeth pain. Is having some TMJ pain-was given a  but not wearing at this time.    Acute Illness  Acute illness concerns: R facial swelling, sinus pain, headache  Onset/Duration: 3 days  Symptoms:  Fever: no  Chills/Sweats: no  Headache (location?): YES-R sided-not pain but discomfort/pressure  Sinus Pressure: YES  Conjunctivitis:  no  Ear Pain: YES: right-pain  Rhinorrhea: YES-clear to yellow  Congestion: YES  Sore Throat: YES  Cough: no  Wheeze: YES-occasional  Decreased Appetite: no  Nausea: no  Vomiting: no  Diarrhea: no  Dysuria/Freq.: no  Dysuria or Hematuria: no  Fatigue/Achiness: no  Sick/Strep Exposure: no  Therapies tried and outcome: Ibuprofen, increasing hydration, saline solution      Review of Systems   Constitutional, HEENT, cardiovascular, pulmonary, gi and gu systems are negative, except as otherwise noted.      Objective    /78 (BP Location: Right arm, Patient Position: Sitting, Cuff Size: Adult Large)   Pulse 94   Temp 98.2  F (36.8  C) (Oral)   Ht 1.803 m (5' 11\")   Wt 101.2 kg (223 lb)   SpO2 97%   BMI 31.10 kg/m    Body mass index is 31.1 kg/m .  Physical Exam   GENERAL: healthy, " alert and no distress  EYES: Eyes grossly normal to inspection, PERRL and conjunctivae and sclerae normal  HENT: ear canals and TM's normal, nose and mouth without ulcers or lesions, slight swelling noted below R eye, mild pain on palpation of maxillary sinus  NECK: R sided anterior lymphadenopathy, no asymmetry, masses, or scars and thyroid normal to palpation  RESP: lungs clear to auscultation - no rales, rhonchi or wheezes  CV: regular rate and rhythm, normal S1 S2, no S3 or S4, no murmur, click or rub, no peripheral edema and peripheral pulses strong  ABDOMEN: soft, nontender, no hepatosplenomegaly, no masses and bowel sounds normal  MS: no gross musculoskeletal defects noted, no edema  NEURO: Normal strength and tone, mentation intact and speech normal      Results for orders placed or performed in visit on 05/02/22 (from the past 24 hour(s))   COVID-19 ([facility])   Result Value Ref Range    COVID-19 Negative

## 2022-05-05 ENCOUNTER — TELEPHONE (OUTPATIENT)
Dept: FAMILY MEDICINE | Facility: CLINIC | Age: 32
End: 2022-05-05

## 2022-05-05 DIAGNOSIS — B96.89 ACUTE BACTERIAL SINUSITIS: Primary | ICD-10-CM

## 2022-05-05 DIAGNOSIS — J01.90 ACUTE BACTERIAL SINUSITIS: Primary | ICD-10-CM

## 2022-05-05 NOTE — TELEPHONE ENCOUNTER
Patient called in and is asking if Lee could send in a prescription for an antibiotic as his symptoms have not changed at all and seem to be getting worse. Routing to Lee for review.    Patient can be reached at [phone]

## 2022-05-06 NOTE — CONFIDENTIAL NOTE
Patient continues to have sinus pain/pressure along with worsening congestion. Agreed to try antibiotics course-Augmentin prescribed to preferred pharmacy. No questions.

## 2022-06-08 ENCOUNTER — TELEPHONE (OUTPATIENT)
Dept: PHYSICAL MEDICINE AND REHAB | Facility: CLINIC | Age: 32
End: 2022-06-08
Payer: COMMERCIAL

## 2022-06-08 NOTE — TELEPHONE ENCOUNTER
Left detailed message for pt to call back and confirm of the changed appt time form 12:30 to 8:30am.  Thanks,  Latrice

## 2022-06-23 ENCOUNTER — OFFICE VISIT (OUTPATIENT)
Dept: FAMILY MEDICINE | Facility: CLINIC | Age: 32
End: 2022-06-23

## 2022-06-23 VITALS
BODY MASS INDEX: 31.5 KG/M2 | OXYGEN SATURATION: 96 % | HEIGHT: 71 IN | DIASTOLIC BLOOD PRESSURE: 82 MMHG | SYSTOLIC BLOOD PRESSURE: 122 MMHG | HEART RATE: 90 BPM | TEMPERATURE: 98.1 F | WEIGHT: 225 LBS

## 2022-06-23 DIAGNOSIS — L02.511 ABSCESS OF FINGER OF RIGHT HAND: Primary | ICD-10-CM

## 2022-06-23 DIAGNOSIS — R59.0 ANTERIOR CERVICAL LYMPHADENOPATHY: ICD-10-CM

## 2022-06-23 DIAGNOSIS — L03.011 CELLULITIS OF FINGER OF RIGHT HAND: ICD-10-CM

## 2022-06-23 PROCEDURE — 73130 X-RAY EXAM OF HAND: CPT | Mod: RT | Performed by: PHYSICIAN ASSISTANT

## 2022-06-23 PROCEDURE — 87070 CULTURE OTHR SPECIMN AEROBIC: CPT | Mod: 90 | Performed by: PHYSICIAN ASSISTANT

## 2022-06-23 PROCEDURE — 10060 I&D ABSCESS SIMPLE/SINGLE: CPT | Performed by: PHYSICIAN ASSISTANT

## 2022-06-23 RX ORDER — SULFAMETHOXAZOLE/TRIMETHOPRIM 800-160 MG
1 TABLET ORAL 2 TIMES DAILY
Qty: 14 TABLET | Refills: 0 | Status: SHIPPED | OUTPATIENT
Start: 2022-06-23 | End: 2022-06-30

## 2022-06-23 NOTE — PROGRESS NOTES
"CC: Hand issue, throat pain    History:  Hand infection:  Allan is here today with an infection on right hand 4th digit, lateral surface. Started as normal blister, but then got bigger, and turned to white, and getting more painful. No fever, sweats, chills, spreading redness, drainage. Denies any injury, change in activity, but does work out/lift in gym setting.     Throat pain:  Has been getting intermittent pain in upper neck for 2 weeks on both sides. Aching in general, but tends to get worse over the course of the day. Has been having increased PND. No fever, sweats, chills. No change in breathing, swallowing, voice changes. Does take Zrytec for allergies. Takes most days. Unfortunately, Allan has restarted anabolic steroids as well as testosterone (black-market) in the past 2 months. He did similar unmonitored medications last year and lead to several significant health concerns, especially GI symptoms.      PMH, MEDICATIONS, ALLERGIES, SOCIAL AND FAMILY HISTORY in [facility] and reviewed by me personally.    ROS negative other than the symptoms noted above in the HPI.    Examination   /82 (BP Location: Left arm, Patient Position: Sitting, Cuff Size: Adult Large)   Pulse 90   Temp 98.1  F (36.7  C) (Temporal)   Ht 1.803 m (5' 11\")   Wt 102.1 kg (225 lb)   SpO2 96%   BMI 31.38 kg/m       Constitutional: Sitting comfortably, in no acute distress. Vital signs noted  Ears: external canals and TMs free of abnormalities  Nose: patent, without mucosal abnormalities  Mouth and throat: without erythema or lesions of the mucosa  Neck: bilateral cervical adenopathy at superior anterior cervical chain bilaterally. Trachea midline and normal to palpation, thyroid normal to palpation  Cardiovascular:  regular rate and rhythm, no murmurs, clicks, or gallops  Respiratory:  normal respiratory rate and rhythm, lungs clear to auscultation  M/S: Right hand 4th digit with purulent bulla 1.8 cm by 1 cm with surrounding " erythema on lateral aspect. No drainage, erythema on proximal finger. Tender to palpation  NEURO:  muscle strength normal, sensation to light touch and pinprick normal, reflexes normal and symmetric  SKIN: No jaundice/pallor/rash.   Psychiatric: mentation appears normal and affect normal/bright    Procedure:  Cleansed lesion with alcohol swab. Using 11 blade sterile blade made 5 mm incision on medial surface. Purulent drainage expressed. Roof left intact. Culture collected. Applied thin layer of bacitracin ointment. Covered with bandage.     A/P    ICD-10-CM    1. Abscess of finger of right hand  L02.511    2. Cellulitis of finger of right hand  L03.011 sulfamethoxazole-trimethoprim (BACTRIM DS) 800-160 MG tablet     XR Hand Right G/E 3 Views     Culture Aerobic Bacteria ([facility])     CANCELED: XR Hand Right G/E 3 Views   3. Anterior cervical lymphadenopathy  R59.0        DISCUSSION:  Hand infection:  Examined with supervising physician. Hand x-ray completed today with no evidence of osteomyelitis. Radiology report pending.   Consistent with abscess. Successfully drainage today. Concerned with fast worsening of this abscess, and with Allan in gym frequently would like to cover with Bactrim immediately. Take twice daily with food for 1 week. Collected culture to rule out MRSA. Will contact with results. Contact me in 1 week if not significantly better, or sooner with worsening. ER with significant worsening.      Throat pain, cervical adenopathy:  Suspect this may be allergic in nature given timing. Recommended more consistent with Zrytec, consider nasal spray. However, also reiterated to pt that his recreational steroid use could be affecting his immune systems, and he needs to gradually stop these completely and indefinitely. If not resolving over next 2-4 weeks, would consider US of area to rule out lymph node abnormality. Should also decrease and discontinue recreational testosterone use.     follow up visit: As  needed    Madyson Gay PA-C  [facility]

## 2022-06-23 NOTE — NURSING NOTE
Chief Complaint   Patient presents with     Derm Problem     Infection on his right hand. A white bump on that is painful and looks infected.     Throat Pain     Intermittent throat pain for two weeks.       Pre-visit Screening:  Immunizations:  up to date  Colonoscopy:  NA  Mammogram: NA  Asthma Action Test/Plan:  NA  PHQ9:  Goes to psych  GAD7:  Goes to psych  Questioned patient about current smoking habits Pt. recently quit smoking.  Ok to leave detailed message on voice mail for today's visit only Yes, phone # [phone]

## 2022-06-26 LAB — RESULT - QUEST: ABNORMAL

## 2022-06-27 ENCOUNTER — OFFICE VISIT (OUTPATIENT)
Dept: PHYSICAL MEDICINE AND REHAB | Facility: CLINIC | Age: 32
End: 2022-06-27
Payer: COMMERCIAL

## 2022-06-27 VITALS — DIASTOLIC BLOOD PRESSURE: 81 MMHG | SYSTOLIC BLOOD PRESSURE: 155 MMHG | OXYGEN SATURATION: 97 % | HEART RATE: 110 BPM

## 2022-06-27 DIAGNOSIS — Z86.69 HX OF MIGRAINES: ICD-10-CM

## 2022-06-27 DIAGNOSIS — R41.840 ATTENTION AND CONCENTRATION DEFICIT: ICD-10-CM

## 2022-06-27 DIAGNOSIS — G43.009 MIGRAINE WITHOUT AURA AND WITHOUT STATUS MIGRAINOSUS, NOT INTRACTABLE: ICD-10-CM

## 2022-06-27 DIAGNOSIS — G24.3 CERVICAL DYSTONIA: Primary | ICD-10-CM

## 2022-06-27 DIAGNOSIS — S06.0X0D CONCUSSION WITHOUT LOSS OF CONSCIOUSNESS, SUBSEQUENT ENCOUNTER: ICD-10-CM

## 2022-06-27 PROCEDURE — 95874 GUIDE NERV DESTR NEEDLE EMG: CPT | Performed by: PHYSICAL MEDICINE & REHABILITATION

## 2022-06-27 PROCEDURE — 96372 THER/PROPH/DIAG INJ SC/IM: CPT | Performed by: PHYSICAL MEDICINE & REHABILITATION

## 2022-06-27 PROCEDURE — 64616 CHEMODENERV MUSC NECK DYSTON: CPT | Mod: 59 | Performed by: PHYSICAL MEDICINE & REHABILITATION

## 2022-06-27 PROCEDURE — 99215 OFFICE O/P EST HI 40 MIN: CPT | Mod: 25 | Performed by: PHYSICAL MEDICINE & REHABILITATION

## 2022-06-27 ASSESSMENT — ANXIETY QUESTIONNAIRES
3. WORRYING TOO MUCH ABOUT DIFFERENT THINGS: SEVERAL DAYS
1. FEELING NERVOUS, ANXIOUS, OR ON EDGE: SEVERAL DAYS
GAD7 TOTAL SCORE: 6
GAD7 TOTAL SCORE: 6
6. BECOMING EASILY ANNOYED OR IRRITABLE: SEVERAL DAYS
2. NOT BEING ABLE TO STOP OR CONTROL WORRYING: SEVERAL DAYS
IF YOU CHECKED OFF ANY PROBLEMS ON THIS QUESTIONNAIRE, HOW DIFFICULT HAVE THESE PROBLEMS MADE IT FOR YOU TO DO YOUR WORK, TAKE CARE OF THINGS AT HOME, OR GET ALONG WITH OTHER PEOPLE: NOT DIFFICULT AT ALL
5. BEING SO RESTLESS THAT IT IS HARD TO SIT STILL: SEVERAL DAYS
7. FEELING AFRAID AS IF SOMETHING AWFUL MIGHT HAPPEN: NOT AT ALL

## 2022-06-27 ASSESSMENT — PATIENT HEALTH QUESTIONNAIRE - PHQ9: 5. POOR APPETITE OR OVEREATING: SEVERAL DAYS
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
## 2022-06-27 NOTE — NURSING NOTE
Chief Complaint   Patient presents with     Head Injury     Concussion Multiple 2017 - 2022 and 3 / 2022 from a work injury       Vitals:    06/27/22 0858   BP: (!) 155/81   Pulse: 110   SpO2: 97%       There is no height or weight on file to calculate BMI.      WOUND EVALUATION:        Change in headache pattern following last series of injections with 200 units of  Botox on 4/1/22.     1.  Headache Frequency During this Injection Cycle:  8 headache days per month.       2.  Headache Duration During this Injection Cycle:  20min headache hours per headache.       3.  Headache Intensity During this Injection Cycle:  Headache intensity during this injection cycle:      A.  2/10  =  Typical pain level.    B.  4/10  =  Worst pain level.    C.  1/10  =  Lowest pain level.       4.  Change in headache medication usage during this injection cycle:  (For Example:  Able to decrease use of oral pain medications.) none       5.  ER Visits During This Injection Cycle:  none       6.  Functional Performance:  Change in ADL's, social interaction, days lost from work, etc. none      Debbie Hood, EMT
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
## 2022-06-27 NOTE — PATIENT INSTRUCTIONS
"    GENERAL ADVICE:  ~ Gradually ease back into your usual activities.   ~ Rest as needed to help your symptoms go away.  - Consider pairing 50 minutes of activity with 10 minutes of rest.  ~ Allow yourself more time for activities.  ~ Write things down.  At home, at work, whenever there is something that you should remember, even it is simple.  SCREENS:  ~ Change settings on your phone and computer using the \"Blue Light Filter\" (Night Shift on all  Apple products)  ~ The goal is making screens more yellow and less blue.    ~ If this is not an option you can download this program, 23press, to adjust your screen resolution.  ~ [employer] for various filter and font apps  ~ Turn screen brightness down  ~ Increase font size  ~ Limit screen activities including computer, TV and phones.  NECK PAIN:  ~ Ice or Heat are good~  ~ Massage is ok if it doesn't trigger more symptoms~  ~ Gentle stretches and range-of-motion are helpful.  DIZZINESS:  ~ No driving when dizzy.  ~ No biking, climbing heights or ladders if dizzy.  FATIGUE:  ~ Daily exercise is strongly encouraged.  Start with a 10 min walk and increase the time as tolerated until you are walking 30 minutes per day.    ~ Focus on Good sleep hygiene instead of napping . Your goal should be 8 hours of sleep every night.  ~ Try Melatonin 1 hour before bed  ANXIETY OR MOOD SWINGS:  ~ If you are irritable or anxious, take a break in a quiet room.  ~ Try using the free Calm johnny for guided breathing and mindfulness/meditation.  ~ Explore haku (https://www.headspace.com) for free and easy-to-use meditation guidance.  LIGHT SENSITIVITIES:  ~ Avoid florescent lighting when possible.  ~Yellow or kyra tinted lenses may help reduce computer or night-time road glare.             ~ Amazon has a $10.00 option: [employer]s yellow Night Vision.  NOISE SENSITIVITIES:  ~ Try listening to calming sounds such as the \"Calm Johnny\" to help shift your focus off of more irritating " sounds.  ~ Avoid crowded areas at first then slowly introduce yourself to small increments of crowded, noisy areas.  ~ Try High fidelity earplugs used by Mus[employer]. Etymotic ETY-Plugs, can be found on Amazon for $13.00.  DIET:  - In principle incorporate more protein, lots of veggies, some fruit, whole grains.    - Less sweets and saturated fat.   - Mediterranean Diet is an easy-to-follow example.  ~ Drink plenty of water throughout the day (8-10 glasses per day)    [facility]   Phone # [phone]  Fax # [phone]  Scheduling; # [phone]      Thank you for allowing us to be a part of your care.

## 2022-06-29 ENCOUNTER — MYC MEDICAL ADVICE (OUTPATIENT)
Dept: FAMILY MEDICINE | Facility: CLINIC | Age: 32
End: 2022-06-29

## 2022-06-29 ENCOUNTER — OFFICE VISIT (OUTPATIENT)
Dept: FAMILY MEDICINE | Facility: CLINIC | Age: 32
End: 2022-06-29

## 2022-06-29 VITALS
HEART RATE: 90 BPM | WEIGHT: 225 LBS | BODY MASS INDEX: 31.5 KG/M2 | SYSTOLIC BLOOD PRESSURE: 120 MMHG | DIASTOLIC BLOOD PRESSURE: 78 MMHG | OXYGEN SATURATION: 98 % | HEIGHT: 71 IN | TEMPERATURE: 97.7 F

## 2022-06-29 DIAGNOSIS — Z11.3 SCREEN FOR STD (SEXUALLY TRANSMITTED DISEASE): ICD-10-CM

## 2022-06-29 DIAGNOSIS — T38.7X5D: ICD-10-CM

## 2022-06-29 DIAGNOSIS — H53.8 BLURRED VISION: ICD-10-CM

## 2022-06-29 DIAGNOSIS — R22.0 FACIAL SWELLING: Primary | ICD-10-CM

## 2022-06-29 LAB — ERYTHROCYTE [SEDIMENTATION RATE] IN BLOOD: 3 MM/HR (ref 0–20)

## 2022-06-29 PROCEDURE — 99214 OFFICE O/P EST MOD 30 MIN: CPT | Performed by: PHYSICIAN ASSISTANT

## 2022-06-29 PROCEDURE — 85651 RBC SED RATE NONAUTOMATED: CPT | Performed by: PHYSICIAN ASSISTANT

## 2022-06-29 PROCEDURE — 87591 N.GONORRHOEAE DNA AMP PROB: CPT | Performed by: PHYSICIAN ASSISTANT

## 2022-06-29 PROCEDURE — 87491 CHLMYD TRACH DNA AMP PROBE: CPT | Performed by: PHYSICIAN ASSISTANT

## 2022-06-29 PROCEDURE — 36415 COLL VENOUS BLD VENIPUNCTURE: CPT | Performed by: PHYSICIAN ASSISTANT

## 2022-06-29 NOTE — PROGRESS NOTES
"CC: STD check, hormone/testosterone concerns, blurry vision    History:  Recreational steroid, testosterone use:  Testosterone restarted 4 months ago. Then added trendolone (anabolic steroid 2 months ago). Stopped both approximately 10 days ago.     STD:  Had unprotected intercourse with new partner. No known exposures. No symptoms, but would like to be tested.     Blurry vision, eye lump:  Getting lumps outside of eyes for the past 2 weeks. Worse on right side for past 2 weeks. Mildly tender Now in the past 2 days was getting right eye blurriness. Does seem better today. No cognitive changes, loss of vision, musculoskeletal changes. The swelling/lump started before stopping the testosterone/steroids.     PMH, MEDICATIONS, ALLERGIES, SOCIAL AND FAMILY HISTORY in [facility] and reviewed by me personally.    ROS negative other than the symptoms noted above in the HPI.     Examination   /78 (BP Location: Left arm, Patient Position: Sitting, Cuff Size: Adult Large)   Pulse 90   Temp 97.7  F (36.5  C) (Temporal)   Ht 1.803 m (5' 11\")   Wt 102.1 kg (225 lb)   SpO2 98%   BMI 31.38 kg/m       Constitutional: Sitting comfortably, in no acute distress. Vital signs noted  Eyes: pupils equal round reactive to light and accomodation, extra ocular movements intact. Visual acuity checked and 20/16 bilaterally.   Ears: external canals and TMs free of abnormalities  Mouth and throat: without erythema or lesions of the mucosa  Neck:  no adenopathy, trachea midline and normal to palpation, thyroid normal to palpation  Cardiovascular:  regular rate and rhythm, no murmurs, clicks, or gallops  Respiratory:  normal respiratory rate and rhythm, lungs clear to auscultation  SKIN: No jaundice/pallor/rash. Mild-moderate edema, mild erythema, and mild tenderness to palpation lateral to right eye. Minimal to mild on left side.   Psychiatric: mentation appears normal and affect normal/bright      A/P    ICD-10-CM    1. Facial swelling  " R22.0 ESR WESTERGREN ([facility])   2. Blurred vision  H53.8    3. Adverse effect of testosterone, subsequent encounter  T38.7X5D Testosterone Free and Total ([facility])     VENOUS COLLECTION   4. Screen for STD (sexually transmitted disease)  Z11.3 Chlamydia Trach/N. Gonorrhoeae RNA TMA(Pap, Swab,Urine)([facility])     HIV 1/2 Agn Lilliana 4th Gen w Reflex ([facility])     RPR with Rflx to Titer and Confirm ([facility])     VENOUS COLLECTION       DISCUSSION:  Recreational steroid, testosterone use:  Called and spoke to Dr. Merritt with [facility], and asked what they advise in patients using ellicit or recreational steroids/testerone. He explained they have no formal process for this, and don't really have designated appts for this, but said he would have pt decrease dose by 1 half every 4 weeks until stopped. Explained this to pt.     STD:  Will complete STD screen today and send MyChart with results.     Blurry vision, eye lump:  Reassured by normal acuity today. Did consider temporal arteritis in this young patient, but does seem inferior to temple, and reassured by normal ESR today. Suspect more of a lymphadenopathy or possible general vascular enlargement from steroid use. Trial of ice, heat, ibuprofen 2 tablets with food every 4-6 hours. Will submit referral to ENT to further evaluate if symptoms do not resolve. ER with any significant worsening.     follow up visit: As needed    Time of visit: 35 minutes    Madyson Gay PA-C  [facility]

## 2022-06-29 NOTE — NURSING NOTE
Chief Complaint   Patient presents with     STD     STD testing, check testosterone levels     Mass     Lump on the ride side of his face. Causing some blurred vision.         Pre-visit Screening:  Immunizations:  up to date  Colonoscopy:  NA  Mammogram: NA  Asthma Action Test/Plan:  NA  PHQ9:  NA  GAD7:  NA  Questioned patient about current smoking habits Pt. quit smoking some time ago.  Ok to leave detailed message on voice mail for today's visit only Yes, phone # [phone]

## 2022-06-29 NOTE — NURSING NOTE
VISION   No corrective lenses  Tool used: Beni   Right eye:        10/8 (20/16)  Left eye:          10/8 (20/16)  Visual Acuity: Pass

## 2022-06-30 LAB
CHLAMYDIA TRACHOMATIS RNA, TMA - QUEST: NOT DETECTED
HIV 1/2 AGN ABY 4TH GEN WITH REFLEX: NORMAL
NEISSERIA GONORRHOEAE RNA TMA: NOT DETECTED
RPR SCREEN - QUEST: NORMAL
SEE NOTE - QUEST: NORMAL

## 2022-07-01 NOTE — TELEPHONE ENCOUNTER
Called and spoke to Allan. Informed of normal ESR, reassuring, continue with plan to heat/ice, take ibuprofen, and follow-up with ENT if not resolving. ER with signficant worsening.

## 2022-07-02 LAB
TESTOSTERONE FREE LC/MS/MS - QUEST: 303.2 PG/ML (ref 35–155)
TESTOSTERONE, TOTAL, LC/MS/MS-QUEST: 996 NG/DL (ref 250–1100)

## 2022-08-01 ENCOUNTER — TRANSFERRED RECORDS (OUTPATIENT)
Dept: FAMILY MEDICINE | Facility: CLINIC | Age: 32
End: 2022-08-01

## 2022-08-01 ENCOUNTER — TELEPHONE (OUTPATIENT)
Dept: FAMILY MEDICINE | Facility: CLINIC | Age: 32
End: 2022-08-01

## 2022-08-24 ENCOUNTER — TRANSFERRED RECORDS (OUTPATIENT)
Dept: FAMILY MEDICINE | Facility: CLINIC | Age: 32
End: 2022-08-24

## 2022-09-16 ENCOUNTER — OFFICE VISIT (OUTPATIENT)
Dept: FAMILY MEDICINE | Facility: CLINIC | Age: 32
End: 2022-09-16

## 2022-09-16 VITALS
WEIGHT: 225 LBS | DIASTOLIC BLOOD PRESSURE: 80 MMHG | SYSTOLIC BLOOD PRESSURE: 120 MMHG | HEIGHT: 71 IN | HEART RATE: 96 BPM | TEMPERATURE: 97.3 F | BODY MASS INDEX: 31.5 KG/M2

## 2022-09-16 DIAGNOSIS — L23.89 ALLERGIC CONTACT DERMATITIS DUE TO OTHER AGENTS: Primary | ICD-10-CM

## 2022-09-16 PROCEDURE — 99213 OFFICE O/P EST LOW 20 MIN: CPT | Performed by: PHYSICIAN ASSISTANT

## 2022-09-16 RX ORDER — PREDNISONE 20 MG/1
40 TABLET ORAL DAILY
Qty: 10 TABLET | Refills: 0 | Status: SHIPPED | OUTPATIENT
Start: 2022-09-16 | End: 2023-02-01

## 2022-09-16 RX ORDER — CETIRIZINE HYDROCHLORIDE 10 MG/1
10 TABLET ORAL DAILY
Qty: 30 TABLET | Refills: 0 | Status: SHIPPED | OUTPATIENT
Start: 2022-09-16 | End: 2022-10-17

## 2022-09-16 NOTE — NURSING NOTE
Allan Leon is here for a rash on his arms, legs and torso for the past few days. Not sure if it is poison ivy or not.    Questioned patient about current smoking habits.  Pt. quit smoking some time ago.  PULSE regular  My Chart: active  CLASSIFICATION OF OVERWEIGHT AND OBESITY BY BMI                        Obesity Class           BMI(kg/m2)  Underweight                                    < 18.5  Normal                                         18.5-24.9  Overweight                                     25.0-29.9  OBESITY                     I                  30.0-34.9                             II                 35.0-39.9  EXTREME OBESITY             III                >40                            Patient's  BMI Body mass index is 31.38 kg/m .  http://hin.nhlbi.nih.gov/menuplanner/menu.cgi  Pre-visit planning  Immunizations - up to date  Colonoscopy -   Mammogram -   Asthma -   PHQ9 -    MARYA-7 -

## 2022-09-16 NOTE — PROGRESS NOTES
Assessment & Plan     Allergic contact dermatitis due to other agents  Suspect due to new bowling ball powder or exposure in the woods   - predniSONE (DELTASONE) 20 MG tablet  Dispense: 10 tablet; Refill: 0  - cetirizine (ZYRTEC) 10 MG tablet  Dispense: 30 tablet; Refill: 0      Prednisone 40 mg daily for 5 days  Benadryl 25 mg 3x a day  Zyrtec daily 10 mg    See me 6-7 days recheck acne          TJ Addison  [facility]    Subjective     Nursing Notes:   Naomy Edwards, [facility]  9/16/2022  2:09 PM  Signed  Allan Leon is here for a rash on his arms, legs and torso for the past few days. Not sure if it is poison ivy or not.    Questioned patient about current smoking habits.  Pt. quit smoking some time ago.  PULSE regular  My Chart: active  CLASSIFICATION OF OVERWEIGHT AND OBESITY BY BMI                        Obesity Class           BMI(kg/m2)  Underweight                                    < 18.5  Normal                                         18.5-24.9  Overweight                                     25.0-29.9  OBESITY                     I                  30.0-34.9                             II                 35.0-39.9  EXTREME OBESITY             III                >40                            Patient's  BMI Body mass index is 31.38 kg/m .  http://hin.nhlbi.nih.gov/menuplanner/menu.cgi  Pre-visit planning  Immunizations - up to date  Colonoscopy -   Mammogram -   Asthma -   PHQ9 -    MARYA-7 -              Allan Leon is a 32 year old male who presents to clinic today for the following health issues:     HPI   Here with rash  First notice on wrist - bilateral wrist -red dots.   Spread up arms  Noticed on legs - 2 days ago  Itches all over    Last weekend - played volleyball  Went into woods  Didn't have shoes - ran into the woods    Pt allergic to Rosin  Pt bowls, started using new Bowling powder   Helps you  the bacll  Stopped this last week  Started  "using new sitcky stuff for the ball on Monday      Denies fevers/chills  No upper resp    OTC Calamine, oatmeal bath, hydrocortisone, Benadryl.     Does have sensitive skin    Gets rash on stomach - from belt buckle    Pt also has acne he can't control      Objective    /80 (BP Location: Left arm, Patient Position: Chair, Cuff Size: Adult Large)   Pulse 96   Temp 97.3  F (36.3  C)   Ht 1.803 m (5' 11\")   Wt 102.1 kg (225 lb)   BMI 31.38 kg/m    Body mass index is 31.38 kg/m .  Physical Exam   GENERAL: healthy, alert and no distress    Papular rash on arms, chest thighs.  Mild comedones on back and face.   Papules are lightly erythematous          "

## 2022-09-22 ENCOUNTER — OFFICE VISIT (OUTPATIENT)
Dept: FAMILY MEDICINE | Facility: CLINIC | Age: 32
End: 2022-09-22

## 2022-09-22 VITALS
TEMPERATURE: 98 F | DIASTOLIC BLOOD PRESSURE: 80 MMHG | RESPIRATION RATE: 20 BRPM | HEART RATE: 90 BPM | SYSTOLIC BLOOD PRESSURE: 112 MMHG | OXYGEN SATURATION: 98 % | WEIGHT: 208 LBS | BODY MASS INDEX: 29.01 KG/M2

## 2022-09-22 DIAGNOSIS — J31.0 CHRONIC RHINITIS: ICD-10-CM

## 2022-09-22 DIAGNOSIS — L70.0 ACNE VULGARIS: Primary | ICD-10-CM

## 2022-09-22 DIAGNOSIS — B07.0 PLANTAR WARTS: ICD-10-CM

## 2022-09-22 PROBLEM — F10.21 ALCOHOL DEPENDENCE IN REMISSION (H): Status: RESOLVED | Noted: 2018-07-10 | Resolved: 2022-09-22

## 2022-09-22 PROCEDURE — 99214 OFFICE O/P EST MOD 30 MIN: CPT | Mod: 25 | Performed by: PHYSICIAN ASSISTANT

## 2022-09-22 PROCEDURE — 17110 DESTRUCTION B9 LES UP TO 14: CPT | Performed by: PHYSICIAN ASSISTANT

## 2022-09-22 RX ORDER — MONTELUKAST SODIUM 10 MG/1
10 TABLET ORAL AT BEDTIME
Qty: 30 TABLET | Refills: 3 | Status: SHIPPED | OUTPATIENT
Start: 2022-09-22 | End: 2024-03-20

## 2022-09-22 RX ORDER — DOXYCYCLINE HYCLATE 100 MG
100 TABLET ORAL 2 TIMES DAILY
Qty: 90 TABLET | Refills: 0 | Status: SHIPPED | OUTPATIENT
Start: 2022-09-22 | End: 2023-02-10

## 2022-09-22 NOTE — LETTER
My Depression Action Plan  Name: Allan Leon   Date of Birth 1990  Date: 9/22/2022    My doctor: Madyson Gay   My clinic: [facility]  [address] [place] [address]  [address]  [facility] [id number] [phone]          GREEN    ZONE   Good Control    What it looks like:     Things are going generally well. You have normal ups and downs. You may even feel depressed from time to time, but bad moods usually last less than a day.   What you need to do:  1. Continue to care for yourself (see self care plan)  2. Check your depression survival kit and update it as needed  3. Follow your physician s recommendations including any medication.  4. Do not stop taking medication unless you consult with your physician first.           YELLOW         ZONE Getting Worse    What it looks like:     Depression is starting to interfere with your life.     It may be hard to get out of bed; you may be starting to isolate yourself from others.    Symptoms of depression are starting to last most all day and this has happened for several days.     You may have suicidal thoughts but they are not constant.   What you need to do:     1. Call your care team. Your response to treatment will improve if you keep your care team informed of your progress. Yellow periods are signs an adjustment may need to be made.     2. Continue your self-care.  Just get dressed and ready for the day.  Don't give yourself time to talk yourself out of it.    3. Talk to someone in your support network.    4. Open up your Depression Self-Care Plan/Wellness Kit.           RED    ZONE Medical Alert - Get Help    What it looks like:     Depression is seriously interfering with your life.     You may experience these or other symptoms: You can t get out of bed most days, can t work or engage in other necessary activities, you have trouble taking care of basic hygiene, or basic responsibilities, thoughts of suicide or  death that will not go away, self-injurious behavior.     What you need to do:  1. Call your care team and request a same-day appointment. If they are not available (weekends or after hours) call your local crisis line, emergency room or 911.          Depression Self-Care Plan / Wellness Kit    Many people find that medication and therapy are helpful treatments for managing depression. In addition, making small changes to your everyday life can help to boost your mood and improve your wellbeing. Below are some tips for you to consider. Be sure to talk with your medical provider and/or behavioral health consultant if your symptoms are worsening or not improving.     Sleep   Sleep hygiene  means all of the habits that support good, restful sleep. It includes maintaining a consistent bedtime and wake time, using your bedroom only for sleeping or sex, and keeping the bedroom dark and free of distractions like a computer, smartphone, or television.     Develop a Healthy Routine  Maintain good hygiene. Get out of bed in the morning, make your bed, brush your teeth, take a shower, and get dressed. Don t spend too much time viewing media that makes you feel stressed. Find time to relax each day.    Exercise  Get some form of exercise every day. This will help reduce pain and release endorphins, the  feel good  chemicals in your brain. It can be as simple as just going for a walk or doing some gardening, anything that will get you moving.      Diet  Strive to eat healthy foods, including fruits and vegetables. Drink plenty of water. Avoid excessive sugar, caffeine, alcohol, and other mood-altering substances.     Stay Connected with Others  Stay in touch with friends and family members.    Manage Your Mood  Try deep breathing, massage therapy, biofeedback, or meditation. Take part in fun activities when you can. Try to find something to smile about each day.     Psychotherapy  Be open to working with a therapist if your  provider recommends it.     Medication  Be sure to take your medication as prescribed. Most anti-depressants need to be taken every day. It usually takes several weeks for medications to work. Not all medicines work for all people. It is important to follow-up with your provider to make sure you have a treatment plan that is working for you. Do not stop your medication abruptly without first discussing it with your provider.    Crisis Resources   These hotlines are for both adults and children. They and are open 24 hours a day, 7 days a week unless noted otherwise.      National Suicide Prevention Lifeline   988 or 0-897-071-AXKL (7806)      Crisis Text Line    www.crisistextline.org  Text HOME to 807266 from anywhere in the [place], anytime, about any type of crisis. A live, trained crisis counselor will receive the text and respond quickly.      Dl Lifeline for LGBTQ Youth  A national crisis intervention and suicide lifeline for LGBTQ youth under 25. Provides a safe place to talk without judgement. Call 1-479.538.8944; text START to 237003 or visit www.thetrevorproject.org to talk to a trained counselor.      For [facility] crisis numbers, visit the [facility] website at:  https://mn.gov/dhs/people-we-serve/adults/health-care/mental-health/resources/crisis-contacts.jsp

## 2022-09-22 NOTE — PROGRESS NOTES
Assessment & Plan     Acne vulgaris  Start Doxy in 1 week  Use probiotic at night  Reasses in 4-6 weeks  - doxycycline hyclate (VIBRA-TABS) 100 MG tablet  Dispense: 90 tablet; Refill: 0    Chronic rhinitis  Start Singulair  I reviewed the patient information handout from Up To Date on this medication including side effects with the patient.  Will refer to allergist if not improving  - montelukast (SINGULAIR) 10 MG tablet  Dispense: 30 tablet; Refill: 3    Plantar warts  Compound W  See me 3 weeks    - DESTRUCT BENIGN LESION, UP TO 14    Follow-up CLINIC 3 WEEKS      TJ Addison  [facility]    Subjective     Nursing Notes:   Catherine Palacio CMA  9/22/2022 11:16 AM  Signed  Chief Complaint   Patient presents with     Follow Up     Follow up on rash, has become a lot better and no longer has any concerns with this      Wart     Wart on right foot, wants removed      Nasal Congestion     Has had constant nasal congestion for a long time which is causing pressure and drainage to the throat, has tried OTC nasal sprays and other things but has not helped        Pre-visit Screening:  Immunizations:  up to date  Colonoscopy:  NA  Mammogram: NA  Asthma Action Test/Plan:  N  PHQ9:  Is due but patient requests to have this sent through [employer] to fill out and get up to date  GAD7:  Is due-sent through [employer]  Questioned patient about current smoking habits Pt. quit smoking some time ago.  Ok to leave detailed message on voice mail for today's visit only Yes, phone # [phone]               Allan Leon is a 32 year old male who presents to clinic today for the following health issues:     HPI     1. Zyrtec daily, Flonase daily   2 years pain right side maxillary sinus  Saw ENT  Did scope/surgery of right nose  Helped for a while  Still having throat clearing  Saw allergist many years ago  Last 5 days pain/pressure right maxillary sinus  No fevers    2. Acne on back  Benzoyl peroxide  OTC occ.  Differin occ  No previous PO tx        3. 2nd right toe - medially  Wart for a year  Wart on 1st toe for a year    4. Rash cleared with steroids            Objective    /80 (BP Location: Left arm, Patient Position: Sitting, Cuff Size: Adult Large)   Pulse 90   Temp 98  F (36.7  C) (Temporal)   Resp 20   Wt 94.3 kg (208 lb)   SpO2 98%   BMI 29.01 kg/m    Body mass index is 29.01 kg/m .  Physical Exam   GENERAL: healthy, alert and no distress    SKIN: papular comedones on back    Hyperkeratotic plaque with thrombosed capillaries on 2nd medial right toe and plantar 1st toe  Area cleansed with alcohol wipe   Treatment was accomplished using liquid nitrogen and the three freeze/thaw method x 3 cycles.  Bacitracin and bandage applied  Patient tolerated procedure w/o any complication.

## 2022-09-22 NOTE — NURSING NOTE
Chief Complaint   Patient presents with     Follow Up     Follow up on rash, has become a lot better and no longer has any concerns with this      Wart     Wart on right foot, wants removed      Nasal Congestion     Has had constant nasal congestion for a long time which is causing pressure and drainage to the throat, has tried OTC nasal sprays and other things but has not helped        Pre-visit Screening:  Immunizations:  up to date  Colonoscopy:  NA  Mammogram: NA  Asthma Action Test/Plan:  N  PHQ9:  Is due but patient requests to have this sent through "[employer] to fill out and get up to date  GAD7:  Is due-sent through "[employer]  Questioned patient about current smoking habits Pt. quit smoking some time ago.  Ok to leave detailed message on voice mail for today's visit only Yes, phone # [phone]

## 2022-10-15 ENCOUNTER — HEALTH MAINTENANCE LETTER (OUTPATIENT)
Age: 32
End: 2022-10-15

## 2022-10-17 DIAGNOSIS — L23.89 ALLERGIC CONTACT DERMATITIS DUE TO OTHER AGENTS: ICD-10-CM

## 2022-10-17 RX ORDER — CETIRIZINE HYDROCHLORIDE 10 MG/1
10 TABLET ORAL DAILY
Qty: 90 TABLET | Refills: 3 | Status: SHIPPED | OUTPATIENT
Start: 2022-10-17

## 2022-10-17 NOTE — TELEPHONE ENCOUNTER
Pt sent request for Zyrtec. Please advise, last sent on 09/16/22.    Allan Leon is requesting a refill of:    Pending Prescriptions:                       Disp   Refills    cetirizine (ZYRTEC) 10 MG tablet                 0            Sig: Take 1 tablet (10 mg) by mouth daily

## 2022-11-03 NOTE — PROGRESS NOTES
One time Botox order entered under Dr. Amin for 11/8/22 Botox injection appt due to on-going existing Botox order and referral that is approved is under Dr. Salinas.     Serenity Banda, NERIN RN Care Coordinator  [facility]   [facility] # [phone]

## 2022-11-08 ENCOUNTER — OFFICE VISIT (OUTPATIENT)
Dept: PHYSICAL MEDICINE AND REHAB | Facility: CLINIC | Age: 32
End: 2022-11-08
Payer: COMMERCIAL

## 2022-11-08 VITALS
BODY MASS INDEX: 30.68 KG/M2 | SYSTOLIC BLOOD PRESSURE: 125 MMHG | DIASTOLIC BLOOD PRESSURE: 84 MMHG | HEART RATE: 85 BPM | OXYGEN SATURATION: 100 % | WEIGHT: 220 LBS

## 2022-11-08 DIAGNOSIS — G43.009 MIGRAINE WITHOUT AURA AND WITHOUT STATUS MIGRAINOSUS, NOT INTRACTABLE: ICD-10-CM

## 2022-11-08 DIAGNOSIS — G24.3 CERVICAL DYSTONIA: Primary | ICD-10-CM

## 2022-11-08 PROCEDURE — 64615 CHEMODENERV MUSC MIGRAINE: CPT | Performed by: PHYSICAL MEDICINE & REHABILITATION

## 2022-11-08 PROCEDURE — 95874 GUIDE NERV DESTR NEEDLE EMG: CPT | Performed by: PHYSICAL MEDICINE & REHABILITATION
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
## 2022-11-08 NOTE — NURSING NOTE
Chief Complaint   Patient presents with     RECHECK     Botox injections confirmed with patient     Kika Michaud CMA at 10:28 AM on 11/8/2022.

## 2023-01-30 DIAGNOSIS — J31.0 CHRONIC RHINITIS: ICD-10-CM

## 2023-01-30 RX ORDER — MONTELUKAST SODIUM 10 MG/1
TABLET ORAL
Qty: 30 TABLET | Refills: 3 | COMMUNITY
Start: 2023-01-30

## 2023-01-30 NOTE — TELEPHONE ENCOUNTER
Allan Leon is requesting a refill of:    Refused Prescriptions:                       Disp   Refills    montelukast (SINGULAIR) 10 MG tablet [Phar*30 tab*3        Sig: TAKE 1 TABLET(10 MG) BY MOUTH AT BEDTIME  Refused By: KAYLA VARMA  Reason for Refusal: Patient needs appointment    Pt due for OV

## 2023-02-01 ENCOUNTER — OFFICE VISIT (OUTPATIENT)
Dept: PHYSICAL MEDICINE AND REHAB | Facility: CLINIC | Age: 33
End: 2023-02-01
Payer: COMMERCIAL

## 2023-02-01 VITALS
BODY MASS INDEX: 30.68 KG/M2 | SYSTOLIC BLOOD PRESSURE: 118 MMHG | HEART RATE: 89 BPM | OXYGEN SATURATION: 96 % | WEIGHT: 220 LBS | DIASTOLIC BLOOD PRESSURE: 77 MMHG

## 2023-02-01 DIAGNOSIS — R41.840 ATTENTION AND CONCENTRATION DEFICIT: ICD-10-CM

## 2023-02-01 DIAGNOSIS — S06.0X0D CONCUSSION WITHOUT LOSS OF CONSCIOUSNESS, SUBSEQUENT ENCOUNTER: ICD-10-CM

## 2023-02-01 DIAGNOSIS — F32.1 MODERATE MAJOR DEPRESSION (H): ICD-10-CM

## 2023-02-01 DIAGNOSIS — G24.3 CERVICAL DYSTONIA: ICD-10-CM

## 2023-02-01 DIAGNOSIS — G43.009 MIGRAINE WITHOUT AURA AND WITHOUT STATUS MIGRAINOSUS, NOT INTRACTABLE: Primary | ICD-10-CM

## 2023-02-01 PROCEDURE — 95874 GUIDE NERV DESTR NEEDLE EMG: CPT | Performed by: PHYSICAL MEDICINE & REHABILITATION

## 2023-02-01 PROCEDURE — 64615 CHEMODENERV MUSC MIGRAINE: CPT | Performed by: PHYSICAL MEDICINE & REHABILITATION

## 2023-02-01 ASSESSMENT — PATIENT HEALTH QUESTIONNAIRE - PHQ9: SUM OF ALL RESPONSES TO PHQ QUESTIONS 1-9: 7

## 2023-02-01 NOTE — NURSING NOTE
Chief Complaint   Patient presents with     RECHECK     botox injections confirmed with patient     Kika Michaud CMA at 3:07 PM on 2/1/2023.
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
## 2023-02-08 ENCOUNTER — VIRTUAL VISIT (OUTPATIENT)
Dept: PHARMACY | Facility: CLINIC | Age: 33
End: 2023-02-08
Attending: PHYSICAL MEDICINE & REHABILITATION
Payer: COMMERCIAL

## 2023-02-08 DIAGNOSIS — F32.1 MODERATE MAJOR DEPRESSION (H): ICD-10-CM

## 2023-02-08 DIAGNOSIS — J30.2 SEASONAL ALLERGIC RHINITIS, UNSPECIFIED TRIGGER: ICD-10-CM

## 2023-02-08 DIAGNOSIS — F90.0 ADHD (ATTENTION DEFICIT HYPERACTIVITY DISORDER), INATTENTIVE TYPE: ICD-10-CM

## 2023-02-08 DIAGNOSIS — G43.009 MIGRAINE WITHOUT AURA AND WITHOUT STATUS MIGRAINOSUS, NOT INTRACTABLE: Primary | ICD-10-CM

## 2023-02-08 DIAGNOSIS — G44.309 POST-CONCUSSION HEADACHE: ICD-10-CM

## 2023-02-08 DIAGNOSIS — N52.9 ERECTILE DYSFUNCTION, UNSPECIFIED ERECTILE DYSFUNCTION TYPE: ICD-10-CM

## 2023-02-08 DIAGNOSIS — L70.0 ACNE VULGARIS: ICD-10-CM

## 2023-02-08 PROCEDURE — 99605 MTMS BY PHARM NP 15 MIN: CPT | Performed by: PHARMACIST

## 2023-02-08 PROCEDURE — 99607 MTMS BY PHARM ADDL 15 MIN: CPT | Performed by: PHARMACIST

## 2023-02-08 RX ORDER — ELETRIPTAN HYDROBROMIDE 40 MG/1
40 TABLET, FILM COATED ORAL
Qty: 12 TABLET | Refills: 3 | Status: SHIPPED | OUTPATIENT
Start: 2023-02-08

## 2023-02-08 NOTE — PATIENT INSTRUCTIONS
"Recommendations from today's MTM visit:                                                      1. Start Relpax (eletriptan) 40 mg by mouth at onset of migraine. Monitor for improvement in severity and duration of migraine attacks. Potential side effects include nausea and weakness.    2. Please limit use of Relpax to no more than 9 days per month.     Follow-up: with Dr. Amin and with me as needed     It was great speaking with you today.  I value your experience and would be very thankful for your time in providing feedback in our clinic survey. In the next few days, you may receive an email or text message from [employer] with a link to a survey related to your  clinical pharmacist.\"     To schedule another MTM appointment, please call the clinic directly or you may call the MTM scheduling line at [phone] or toll-free at [phone].     My Clinical Pharmacist's contact information:                                                      Please feel free to contact me with any questions or concerns you have.      Kandy Ruby, Pharm.D.  Medication Therapy Management Pharmacist  [facility]     "

## 2023-02-08 NOTE — PROGRESS NOTES
Medication Therapy Management (MTM) Encounter    ASSESSMENT:                            Medication Adherence/Access: No issues identified. Patient plans to have an appointment this week to refill medications.    Migraine/post-concussion headache: Patient has no acute treatments available at this time and would like to have something to reduce the severity of migraine attacks. He has tried a wide range of medications previously, and only remembers very slight benefit from Maxalt. It was not significant enough benefit for him to continue taking it. It would be reasonable to try an alternative triptan. Eletriptan (Relpax) has a fast onset and is thought to be the most effective (most potent) triptan. We recommend limiting triptan use to no more than 9 days per month to prevent medication rebound/overuse headaches. Patient is agreeable to this plan.    ADHD: Stable    Allergic Rhinitis: Montelukast has not been effective in treating his symptoms, so it may be appropriate to discontinue this medication. Patient plans to follow up with primary about this.    Acne: Stable. Patient plans to continue doxycyline once he gets refills.    ED: Stable.    Depression: Stable.    PLAN:                            1. Start Relpax (eletriptan) 40 mg by mouth at onset of migraine. Monitor for improvement in severity and duration of migraine attacks. Potential side effects include nausea and weakness.    2. Please limit use of Relpax to no more than 9 days per month.     Follow-up: with Dr. Amin and with me as needed     SUBJECTIVE/OBJECTIVE:                          Allan Leon is a 32 year old male called for an initial visit. He was referred to me from Dr. Amin.      Reason for visit: medication review    Allergies/ADRs: Reviewed in chart  Past Medical History: Reviewed in chart  Tobacco: He reports that he has quit smoking. He quit smokeless tobacco use about 2 years ago.  His smokeless tobacco use included chew.  Alcohol:  once a week (did not specify how much)  Caffeine: 2 cups of coffee per day     Medication Adherence/Access: Patient needs an appointment to refill doxycycline and montelukast. He has been out of these medications for about a month.    Migraine/post-concussion headache: Current preventive medications include: Botox every 3 months. This has been effective in reducing his number of headache days. No current abortive medications at this time. Patient's triggers include: Visual stimuli and sound. Patient reports having 8 headache days per month. Current non-pharmacologic treatments include: dark room, rubbing head, silence.     Past medication trials:  Emgality- headaches got worse  Depakote   Propranolol- tried this twice and didn't help  Topamax- didn't help   Maxalt- did help a little bit  Flexeril  Wellbutrin  Gabapentin  Tramadol  Tylenol/NSAIDs- these don't help     ADHD: Taking Adderall XR 30 mg twice daily and Adderall 10 mg twice daily; no concerns reported today     Allergic Rhinitis: Current medications include cetirizine 10 mg once daily and montelukast 10 mg daily (haven't taken it for 1 month - wasn't helping). He is planning to see his doctor this week to discuss these medications as he doesn't feel they are helping enough.     Acne: was taking doxycycline 100 mg daily but ran out and states he needs to be see his doctor for refills    ED: takes sildenafil 100 mg as needed.    Depression: takes bupropion  mg daily. Did not discuss mood today as patient's primacy concern is his headaches    Today's Vitals: There were no vitals taken for this visit.  ----------------    I spent 14 minutes with this patient today. All changes were made via verbal approval with Dr. Amin. A copy of the visit note was provided to the patient's provider(s).    A summary of these recommendations was sent via [employer].    Alexa Aguilar, PharmD Candidate    Kandy Ruby, Pharm.D.  Medication Therapy Management  Pharmacist  [facility]    Telemedicine Visit Details  Type of service:  Telephone visit  Start Time: 10:31 AM  End Time: 10:45 AM     Medication Therapy Recommendations  Migraine without aura and without status migrainosus, not intractable    Rationale: Synergistic therapy - Needs additional medication therapy - Indication   Recommendation: Start Medication - eletriptan 40 MG tablet   Status: Accepted per Provider

## 2023-02-10 ENCOUNTER — OFFICE VISIT (OUTPATIENT)
Dept: FAMILY MEDICINE | Facility: CLINIC | Age: 33
End: 2023-02-10

## 2023-02-10 VITALS
RESPIRATION RATE: 20 BRPM | HEART RATE: 88 BPM | HEIGHT: 71 IN | DIASTOLIC BLOOD PRESSURE: 72 MMHG | TEMPERATURE: 97.1 F | BODY MASS INDEX: 31.33 KG/M2 | WEIGHT: 223.8 LBS | SYSTOLIC BLOOD PRESSURE: 110 MMHG

## 2023-02-10 DIAGNOSIS — L20.9 ATOPIC DERMATITIS, UNSPECIFIED TYPE: Primary | ICD-10-CM

## 2023-02-10 DIAGNOSIS — R09.81 NASAL CONGESTION: ICD-10-CM

## 2023-02-10 DIAGNOSIS — R09.82 PND (POST-NASAL DRIP): ICD-10-CM

## 2023-02-10 DIAGNOSIS — L70.0 ACNE VULGARIS: ICD-10-CM

## 2023-02-10 PROCEDURE — 99214 OFFICE O/P EST MOD 30 MIN: CPT | Performed by: PHYSICIAN ASSISTANT

## 2023-02-10 RX ORDER — CLINDAMYCIN PHOSPHATE 11.9 MG/ML
SOLUTION TOPICAL 2 TIMES DAILY
Qty: 60 ML | Refills: 1 | Status: SHIPPED | OUTPATIENT
Start: 2023-02-10

## 2023-02-10 RX ORDER — DOXYCYCLINE HYCLATE 100 MG
100 TABLET ORAL 2 TIMES DAILY
Qty: 180 TABLET | Refills: 1 | Status: SHIPPED | OUTPATIENT
Start: 2023-02-10

## 2023-02-10 RX ORDER — TRIAMCINOLONE ACETONIDE 1 MG/G
CREAM TOPICAL 2 TIMES DAILY
Qty: 30 G | Refills: 1 | Status: SHIPPED | OUTPATIENT
Start: 2023-02-10

## 2023-02-10 NOTE — PROGRESS NOTES
"CC: Medication Check    History:  Back Acne:  Had been taking doxycycline 100 mg twice daily prescribed for back acne 9/2022. Was on this for close for 2 months, and this did seem help, but now getting worse again.    Eczema:  Still having some dry itchy rash by belt bucke, elbow, low back. Has been somewhat responding to hydrocortisone OTC.    Nasal congestion:  Continues to have nasal drainage/congestion despite septoplasty, turbinate reduction. Was prescribed montelukast at 9/2022 appt, and this didn't seem to make a difference. Already takes Zyrtec daily, as well as Flonase. Continues to have notice general throat irritation during day and night. More so on right side. Doesn't pay attention to it as much during day, but at night hard to ignore. Will spend hours trying to sleep but having throat clearing sensation.     PMH, MEDICATIONS, ALLERGIES, SOCIAL AND FAMILY HISTORY in [facility] and reviewed by me personally.    ROS negative other than the symptoms noted above in the HPI.    Examination   /72 (BP Location: Left arm, Patient Position: Chair, Cuff Size: Adult Large)   Pulse 88   Temp 97.1  F (36.2  C) (Temporal)   Resp 20   Ht 1.803 m (5' 11\")   Wt 101.5 kg (223 lb 12.8 oz)   BMI 31.21 kg/m       Constitutional: Sitting comfortably, in no acute distress. Vital signs noted  Neck:  no adenopathy, trachea midline and normal to palpation, no jugular venous distention  Cardiovascular:  regular rate and rhythm, no murmurs, clicks, or gallops  Respiratory:  normal respiratory rate and rhythm, lungs clear to auscultation  SKIN: No jaundice/pallor. Erythematous cystic lesions on chest, back. Multiple pustules.   Psychiatric: mentation appears normal and affect normal/bright        A/P    ICD-10-CM    1. Atopic dermatitis, unspecified type  L20.9 triamcinolone (KENALOG) 0.1 % external cream      2. Acne vulgaris  L70.0 doxycycline hyclate (VIBRA-TABS) 100 MG tablet     clindamycin (CLEOCIN T) 1 % external " solution          DISCUSSION:  Back Acne:  Agreed to trial back on doxycycline. Wasn't on long enough course before. Told him will likely need 3-6 months, and when ready to discontinue will want to taper off. Take with food, monitor for side effects (tooth changes), and contact me if noted. Also prescribed clindamycin solution to use topically.     Eczema:  Agreed to prescribe triamcinolone to use as needed. Warned of possible side effects of topical steroid therapy, and advised to monitor closely for possibly permanent skin changes like thinning, whitening of the skin, and stop immediately if noted.     Nasal congestion:  Recommended return to ENT provider. I will submit referral. They will be familiar with his operative history and may be able to determine if further evaluation, imaging, treatments are an option.     follow up visit: As needed    Time of visit: 35 minutes    Madyson Gay PA-C  [facility]

## 2023-03-07 ENCOUNTER — TRANSFERRED RECORDS (OUTPATIENT)
Dept: FAMILY MEDICINE | Facility: CLINIC | Age: 33
End: 2023-03-07
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
## 2023-04-27 ENCOUNTER — OFFICE VISIT (OUTPATIENT)
Dept: PHYSICAL MEDICINE AND REHAB | Facility: CLINIC | Age: 33
End: 2023-04-27
Payer: COMMERCIAL

## 2023-04-27 VITALS — OXYGEN SATURATION: 99 % | DIASTOLIC BLOOD PRESSURE: 81 MMHG | HEART RATE: 76 BPM | SYSTOLIC BLOOD PRESSURE: 121 MMHG

## 2023-04-27 DIAGNOSIS — G24.3 CERVICAL DYSTONIA: ICD-10-CM

## 2023-04-27 DIAGNOSIS — G43.009 MIGRAINE WITHOUT AURA AND WITHOUT STATUS MIGRAINOSUS, NOT INTRACTABLE: Primary | ICD-10-CM

## 2023-04-27 DIAGNOSIS — S06.0X0D CONCUSSION WITHOUT LOSS OF CONSCIOUSNESS, SUBSEQUENT ENCOUNTER: ICD-10-CM

## 2023-04-27 PROCEDURE — 95874 GUIDE NERV DESTR NEEDLE EMG: CPT | Performed by: PHYSICAL MEDICINE & REHABILITATION

## 2023-04-27 PROCEDURE — 64615 CHEMODENERV MUSC MIGRAINE: CPT | Performed by: PHYSICAL MEDICINE & REHABILITATION

## 2023-04-27 ASSESSMENT — PAIN SCALES - GENERAL: PAINLEVEL: MILD PAIN (2)

## 2023-04-27 NOTE — NURSING NOTE
Chief Complaint   Patient presents with     RECHECK     Botox injections confirmed with patient     Kika Michaud CMA at 6:54 AM on 4/27/2023.
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
## 2023-06-01 ENCOUNTER — HEALTH MAINTENANCE LETTER (OUTPATIENT)
Age: 33
End: 2023-06-01

## 2023-06-25 ENCOUNTER — APPOINTMENT (OUTPATIENT)
Dept: GENERAL RADIOLOGY | Facility: CLINIC | Age: 33
End: 2023-06-25
Attending: PHYSICIAN ASSISTANT
Payer: COMMERCIAL

## 2023-06-25 ENCOUNTER — HOSPITAL ENCOUNTER (EMERGENCY)
Facility: CLINIC | Age: 33
Discharge: HOME | End: 2023-06-25
Attending: PHYSICIAN ASSISTANT | Admitting: PHYSICIAN ASSISTANT
Payer: COMMERCIAL

## 2023-06-25 VITALS
DIASTOLIC BLOOD PRESSURE: 85 MMHG | TEMPERATURE: 97.5 F | OXYGEN SATURATION: 99 % | SYSTOLIC BLOOD PRESSURE: 131 MMHG | RESPIRATION RATE: 16 BRPM | HEART RATE: 92 BPM | BODY MASS INDEX: 33.6 KG/M2 | HEIGHT: 71 IN | WEIGHT: 240 LBS

## 2023-06-25 DIAGNOSIS — J31.0 CHRONIC RHINITIS: ICD-10-CM

## 2023-06-25 DIAGNOSIS — R07.89 CHEST WALL PAIN: ICD-10-CM

## 2023-06-25 DIAGNOSIS — R09.82 POST-NASAL DRAINAGE: ICD-10-CM

## 2023-06-25 DIAGNOSIS — B37.0 THRUSH: ICD-10-CM

## 2023-06-25 PROCEDURE — 99283 EMERGENCY DEPT VISIT LOW MDM: CPT | Mod: 25

## 2023-06-25 PROCEDURE — 71046 X-RAY EXAM CHEST 2 VIEWS: CPT

## 2023-06-25 RX ORDER — NYSTATIN 100000/ML
500000 SUSPENSION, ORAL (FINAL DOSE FORM) ORAL 4 TIMES DAILY
Qty: 200 ML | Refills: 0 | Status: SHIPPED | OUTPATIENT
Start: 2023-06-25 | End: 2023-07-05

## 2023-06-25 ASSESSMENT — ACTIVITIES OF DAILY LIVING (ADL): ADLS_ACUITY_SCORE: 37

## 2023-06-25 NOTE — ED PROVIDER NOTES
History     Chief Complaint:  Multiple Complaints       The history is provided by the patient and a parent.      Allan Leon is a 33 year old male with history of concussion and cerebral cyst who presents with right sided throat congestion and upper left sided myalgias for the past two years. Allan describes his congestion chronically in his throat and right nostril. Patient went to the ENT for deviated septum repair years ago which resulted in no relief. Patient also went to  a few days ago and was reported to have a sinus infection. They prescribed him amoxicillin/clavulonic acid, which provided incomplete relief though he is still taking this. Patient resorted to the use of Neti pot to relieve symptoms, but reports it makes him have a cough, chest tightness, shortness of breath, and soreness right as he is using it and it goes done his throat. He feels like he always needs to clear his throat. Additionally, the patient states that his left upper side myalgia that started suddenly after coughing. Alongside this, Allan states that when he leans over, he experiences nasal drip on occasion. Patient denies fevers, recent head injuries, falls, or trauma or surgeries. Outside of this, the patient has been using Flonase for his symptoms which has also provided incomplete relief.    Independent Historian:   Parent - They report Allan had 3 concusions on the right side of his head. All of these occurred over a year ago.  Review of External Notes: I reviewed Dr. Medley otolaryngology note from 11/29/2021 where patient was seen for nasal issues and diagnosed with nasal turbinate hypertrophy right TMJ deviated septum and some of his symptoms were felt to be somatoform in nature.      Medications:    Adderall  Wellbutrin  Zyrtec  Depakote  Cleocin  Vibra-tabs  Relpax  Viagra  Kenalog   Amoxicillin  Azithromycin  Burpropion  Cetirizine  Butalbital acetaminophen caffeine   Cefdinir  Cyclobenzaprine  Doxycycline  "hyclate  Dextroamphetamine-amphetamine  Guanfacine  Levofloxacin  Meloxicam  Methylprednisolone   Montelukast  Naproxen   Omeprazole   Oxycodone-acetaminophen   Acetaminophen   Maxalt  Fexofenadine   Valacyclovir   Trazodone   Topirmate  Rizatiptan  Relpax   Propranolol  Prednisone  Sildenafil  Sulfamethoxazole   Stimulant Laxative   Augmentin  Botox  Senokot S  Miralax    Past Medical History:  Asthma    Erectile Dysfunction  Alcohol intoxication  Constipation  Volar dislocation of metacarpal joint  Smoker  Eczema  ADHD  Hallucination  Cerebral cyst  Depression   Suicidal ideation  Traumatic brain injury   Anabolic steroid use   Acne vulgaris   Orthostatic hypotension  Post-concussion headache   Chronic rhinitis   Appendicitis  Dog bite  Migraine  Concussion x 3    Past Surgical History:    Appendectomy  Cosmetic surgery   Pylorotomy  Hernia Repair  EGD  Abdominal surgery as an infant    Physical Exam     Patient Vitals for the past 24 hrs:   BP Temp Temp src Pulse Resp SpO2 Height Weight   06/25/23 1002 131/85 -- -- 92 -- -- 1.803 m (5' 11\") 108.9 kg (240 lb)   06/25/23 0954 126/85 -- -- 97 -- -- -- --   06/25/23 0944 (!) 154/89 97.5  F (36.4  C) Temporal 90 16 99 % -- --        Physical Exam  General: Awake, alert, non-toxic.  Head:  Scalp is NC/AT.  No facial or periorbital swelling or redness.  Eyes:  Conjunctiva normal, PERRL, EOMI.  ENT:  Nares appear grossly patent.  There is no fluid leaking from the patient's nares when he stands with his head hung over for a period of time.  TM's normal BL.  Mastoids and external ear structures w/out redness, swelling or ttp BL.    Oropharynx with some white patches on the tongue and cheek easily scraped off. No tonsillar swelling or exudates.  Uvula midline.  No trismus,sublingual, or submandibular swelling.  Handling secretions no muffled voice.  Neck:  Normal ROM in all directions without rigidity, no masses.  CV:  Regular rate and rhythm    No pathologic murmur, rubs, " or gallops.  Resp:  Breath sounds are clear bilaterally    Non-labored, no retractions or accessory muscle use  Abdomen: Abdomen is soft, no distension, no tenderness, no masses.  MS:  No lower extremity edema or swelling. No midline cervical, thoracic, or lumbar tenderness  Lymph: No cervical or palpable axillary lymphadenopathy  Skin:  Warm and dry, No rash or lesions noted.  Neuro:  Alert and oriented.  GCS 15 Moves all extremities normal.  No facial asymmetry. Gait normal.  Psych:  Awake. Alert. Normal affect. Appropriate interactions.      Emergency Department Course     Imaging:  Chest XR,  PA & LAT   Final Result   IMPRESSION: Negative chest.            Report per radiology    Emergency Department Course & Assessments:       Assessments:  1020 I obtained history and examined the patient as noted above.  1100 I rechecked and updated the patient.    Independent interpretation (X-rays, CTs, Rhythm strips):  I independently reviewed the patient's chest x-ray note no evidence of pneumothorax infiltrate cardiomegaly or consolidation.    Social Determinants of Health affecting care:   None    Disposition:  The patient was discharged to home.     Impression & Plan      Medical Decision Making:  This is a 33-year-old male with history of chronic sinus issues who presents for several different concerns.  Most notably he reports chronic congestion in his nose and needing to clear his throat which has been going on for about 2 years.  He was just seen in urgent care a few days ago and started on Augmentin for possible sinus infection.  He is also using Flonase.  He saw an ENT in the past but has not followed up with them recently.  He likely has more of a chronic rhinitis issues.  Reasonable for him to continue the antibiotics but do not feel there is any indication for emergent sinus imaging or changing antibiotic as low suspicion for acute bacterial sinusitis and certainly nothing to suggest complication such as  meningitis, orbital/preseptal cellulitis fungal sinusitis or cavernous sinus thrombosis.  I suspect most of his throat clearing issues are due to postnasal drip.  He does have some evidence of thrush in the oropharynx and I will treat him with nystatin this may be due to his recent antibiotic use as well as chronic nasal steroids.  No evidence of RPA PTA tonsillitis epiglottitis Lemierre's syndrome or Ludewig's angina.  No neck masses or signs of obstruction.  He reports cough shoulder and chest tightness and shortness of breath but this only occurs right after he does the [place] pot and this is likely due to [place] pot fluid draining down the back of his throat and irritating his airways.  His x-ray shows no evidence of aspiration pneumonia pneumothorax fluid fracture or other acute abnormality.  I have very low suspicion for more serious cardiopulmonary causes such as ACS myocarditis PE dissection etc.  He also endorsed some concern about clear fluid coming out of his nose which I suspect is either due to his [place] pot use or possibly due to rhinorrhea.  There is nothing draining out of the patient's nose at this time no recent trauma no recent surgeries and certainly nothing to suggest CSF leak or skull fracture.  I urged the patient to follow back up with his ENT doctor on his ongoing sinus and congestion issues as well as his primary care provider on his other concerns.  Return precautions for fever neck stiffness vision changes severe headache chest pain or shortness of breath that does not just occur when coughing or using his [place] pot or any other new worsening or changing concerns.    Diagnosis:    ICD-10-CM    1. Thrush  B37.0       2. Chronic rhinitis  J31.0       3. Post-nasal drainage  R09.82       4. Chest wall pain  R07.89            Discharge Medications:  Discharge Medication List as of 6/25/2023 11:22 AM      START taking these medications    Details   nystatin (MYCOSTATIN) 236361 UNIT/ML  suspension Take 5 mLs (500,000 Units) by mouth 4 times daily for 10 days Swish and swallow.Disp-200 mL, [id number]            Scribe Disclosure:  I, Jeff Peterson, am serving as a scribe at 10:03 AM on 6/25/2023    I, Artem Nerivicente Kim, am serving as a scribe at 10:03 AM on 6/25/2023    to document services personally performed by Satish Hui PA-C, based on my observations and the provider's statements to me.   6/25/2023   Satish Hui PA-C Etten, Clark Ellsworth, PA-C  06/25/23 5471

## 2023-06-25 NOTE — ED TRIAGE NOTES
"A&O x4.  ABC's intact.      Pt arrives with c/o having \"clear throat\" multiple times a day for years.  Was seen at  in [place] recommended to use a [place] Pot and possible bacterial infection.  When using the [place] Pot becomes SOB and pain in chest.  Also reports clear nasal drainage when he bends over.     Triage Assessment       Row Name 06/25/23 0945       Triage Assessment (Adult)    Airway WDL WDL       Respiratory WDL    Respiratory WDL WDL       Skin Circulation/Temperature WDL    Skin Circulation/Temperature WDL WDL       Cardiac WDL    Cardiac WDL chest pain       Peripheral/Neurovascular WDL    Peripheral Neurovascular WDL WDL       Cognitive/Neuro/Behavioral WDL    Cognitive/Neuro/Behavioral WDL WDL       Jacqueline Coma Scale    Best Eye Response 4-->(E4) spontaneous    Best Motor Response 6-->(M6) obeys commands    Best Verbal Response 5-->(V5) oriented    Jacqueline Coma Scale Score 15                  "

## 2023-07-27 ENCOUNTER — OFFICE VISIT (OUTPATIENT)
Dept: PHYSICAL MEDICINE AND REHAB | Facility: CLINIC | Age: 33
End: 2023-07-27
Payer: COMMERCIAL

## 2023-07-27 VITALS — SYSTOLIC BLOOD PRESSURE: 122 MMHG | HEART RATE: 81 BPM | DIASTOLIC BLOOD PRESSURE: 75 MMHG | OXYGEN SATURATION: 100 %

## 2023-07-27 DIAGNOSIS — R41.840 ATTENTION AND CONCENTRATION DEFICIT: ICD-10-CM

## 2023-07-27 DIAGNOSIS — S06.0X0D CONCUSSION WITHOUT LOSS OF CONSCIOUSNESS, SUBSEQUENT ENCOUNTER: ICD-10-CM

## 2023-07-27 DIAGNOSIS — G43.009 MIGRAINE WITHOUT AURA AND WITHOUT STATUS MIGRAINOSUS, NOT INTRACTABLE: ICD-10-CM

## 2023-07-27 DIAGNOSIS — G24.3 CERVICAL DYSTONIA: Primary | ICD-10-CM

## 2023-07-27 PROCEDURE — 99212 OFFICE O/P EST SF 10 MIN: CPT | Mod: 25 | Performed by: PHYSICAL MEDICINE & REHABILITATION

## 2023-07-27 PROCEDURE — 95874 GUIDE NERV DESTR NEEDLE EMG: CPT | Performed by: PHYSICAL MEDICINE & REHABILITATION

## 2023-07-27 PROCEDURE — 64615 CHEMODENERV MUSC MIGRAINE: CPT | Performed by: PHYSICAL MEDICINE & REHABILITATION

## 2023-07-27 ASSESSMENT — PAIN SCALES - GENERAL: PAINLEVEL: NO PAIN (0)

## 2023-07-27 NOTE — NURSING NOTE
Chief Complaint   Patient presents with    RECHECK     Botox injections confirmed with patient     Kika Michaud CMA at 11:56 AM on 7/27/2023.
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
## 2023-08-04 ENCOUNTER — TELEPHONE (OUTPATIENT)
Dept: PHYSICAL MEDICINE AND REHAB | Facility: CLINIC | Age: 33
End: 2023-08-04
Payer: COMMERCIAL

## 2023-08-04 NOTE — TELEPHONE ENCOUNTER
Returned call to pt, and confirmed that we faxed over the order to the information he provided us.  [facility], [phone] pt acct#: [id number]

## 2023-08-04 NOTE — TELEPHONE ENCOUNTER
[facility] Call Center    Phone Message    May a detailed message be left on voicemail: yes     Reason for Call: Other: Dr. Amin sent diagnosis orders to [place], but was [phone] acct[facility] [id number]      Action Taken: Message routed to:  [facility] [place]    Travel Screening: Not Applicable

## 2023-08-07 ENCOUNTER — TELEPHONE (OUTPATIENT)
Dept: PHARMACY | Facility: CLINIC | Age: 33
End: 2023-08-07
Payer: COMMERCIAL

## 2023-08-07 NOTE — TELEPHONE ENCOUNTER
Sent Sundeep (1st Attempt) for the patient to call back and schedule the following:    Appointment type: Return MTM - Virtual  Provider: Tung  Return date: First Avail  Specialty phone number: [phone]  Additonal Notes: Recommended follow-up with Dr. Ruby per Dr Amin    Voicemail box full. Sent Bo Epps on 8/7/2023 at 6:18 PM

## 2023-08-08 NOTE — TELEPHONE ENCOUNTER
Spoke with pt. Patient denied 8/9 and 8/11 virtual openings with Tung due to insurance reasons. Pt stated he will call back as soon as possible after his insurance goes through. Denied placeholder appointment. Provided number for call back.    Maribell Epps on 8/8/2023 at 9:48 AM

## 2023-09-05 ENCOUNTER — PATIENT OUTREACH (OUTPATIENT)
Dept: CARE COORDINATION | Facility: CLINIC | Age: 33
End: 2023-09-05
Payer: COMMERCIAL

## 2023-09-05 NOTE — PROGRESS NOTES
Clinic Care Coordination Contact  Program:  [facility]: [place]  Renewal: [employer]   Date Applied:     LILIAN Outreach:   9/5/23: CTA called to see if patient needed assistance with their [employer] Renewal. Patient declined needing assistance and no follow up needed   Saba Osorio  Care   [facility]  Clinic Care Coordination  [phone]      Health Insurance:      Referral/Screening:

## 2024-03-20 ENCOUNTER — TELEPHONE (OUTPATIENT)
Dept: BEHAVIORAL HEALTH | Facility: CLINIC | Age: 34
End: 2024-03-20

## 2024-03-20 ENCOUNTER — HOSPITAL ENCOUNTER (EMERGENCY)
Facility: CLINIC | Age: 34
Discharge: HOME | End: 2024-03-21
Attending: EMERGENCY MEDICINE | Admitting: EMERGENCY MEDICINE

## 2024-03-20 DIAGNOSIS — R45.851 SUICIDAL IDEATION: ICD-10-CM

## 2024-03-20 LAB
ALBUMIN SERPL BCG-MCNC: 4.1 G/DL (ref 3.5–5.2)
ALP SERPL-CCNC: 39 U/L (ref 40–150)
ALT SERPL W P-5'-P-CCNC: 33 U/L (ref 0–70)
ANION GAP SERPL CALCULATED.3IONS-SCNC: 9 MMOL/L (ref 7–15)
AST SERPL W P-5'-P-CCNC: 24 U/L (ref 0–45)
BASOPHILS # BLD AUTO: 0.1 10E3/UL (ref 0–0.2)
BASOPHILS NFR BLD AUTO: 1 %
BILIRUB SERPL-MCNC: 0.3 MG/DL
BUN SERPL-MCNC: 10.9 MG/DL (ref 6–20)
CALCIUM SERPL-MCNC: 8.7 MG/DL (ref 8.6–10)
CHLORIDE SERPL-SCNC: 103 MMOL/L (ref 98–107)
CREAT SERPL-MCNC: 0.84 MG/DL (ref 0.67–1.17)
DEPRECATED HCO3 PLAS-SCNC: 25 MMOL/L (ref 22–29)
EGFRCR SERPLBLD CKD-EPI 2021: >90 ML/MIN/1.73M2
EOSINOPHIL # BLD AUTO: 0.2 10E3/UL (ref 0–0.7)
EOSINOPHIL NFR BLD AUTO: 2 %
ERYTHROCYTE [DISTWIDTH] IN BLOOD BY AUTOMATED COUNT: 12.3 % (ref 10–15)
GLUCOSE SERPL-MCNC: 96 MG/DL (ref 70–99)
HCT VFR BLD AUTO: 42.6 % (ref 40–53)
HGB BLD-MCNC: 14.7 G/DL (ref 13.3–17.7)
IMM GRANULOCYTES # BLD: 0 10E3/UL
IMM GRANULOCYTES NFR BLD: 0 %
LYMPHOCYTES # BLD AUTO: 3.4 10E3/UL (ref 0.8–5.3)
LYMPHOCYTES NFR BLD AUTO: 49 %
MCH RBC QN AUTO: 30.7 PG (ref 26.5–33)
MCHC RBC AUTO-ENTMCNC: 34.5 G/DL (ref 31.5–36.5)
MCV RBC AUTO: 89 FL (ref 78–100)
MONOCYTES # BLD AUTO: 0.7 10E3/UL (ref 0–1.3)
MONOCYTES NFR BLD AUTO: 9 %
NEUTROPHILS # BLD AUTO: 2.8 10E3/UL (ref 1.6–8.3)
NEUTROPHILS NFR BLD AUTO: 39 %
NRBC # BLD AUTO: 0 10E3/UL
NRBC BLD AUTO-RTO: 0 /100
PLATELET # BLD AUTO: 211 10E3/UL (ref 150–450)
POTASSIUM SERPL-SCNC: 3.9 MMOL/L (ref 3.4–5.3)
PROT SERPL-MCNC: 6.1 G/DL (ref 6.4–8.3)
RBC # BLD AUTO: 4.79 10E6/UL (ref 4.4–5.9)
SODIUM SERPL-SCNC: 137 MMOL/L (ref 135–145)
WBC # BLD AUTO: 7.1 10E3/UL (ref 4–11)

## 2024-03-20 PROCEDURE — 36415 COLL VENOUS BLD VENIPUNCTURE: CPT | Performed by: EMERGENCY MEDICINE

## 2024-03-20 PROCEDURE — 85004 AUTOMATED DIFF WBC COUNT: CPT | Performed by: EMERGENCY MEDICINE

## 2024-03-20 PROCEDURE — 80053 COMPREHEN METABOLIC PANEL: CPT | Performed by: EMERGENCY MEDICINE

## 2024-03-20 PROCEDURE — 99285 EMERGENCY DEPT VISIT HI MDM: CPT | Mod: 25

## 2024-03-20 RX ORDER — IBUPROFEN 600 MG/1
600 TABLET, FILM COATED ORAL EVERY 6 HOURS PRN
Status: DISCONTINUED | OUTPATIENT
Start: 2024-03-20 | End: 2024-03-21 | Stop reason: HOSPADM

## 2024-03-20 RX ORDER — TRAZODONE HYDROCHLORIDE 50 MG/1
50 TABLET, FILM COATED ORAL DAILY
COMMUNITY

## 2024-03-20 RX ORDER — LORAZEPAM 1 MG/1
1 TABLET ORAL EVERY 8 HOURS PRN
Status: DISCONTINUED | OUTPATIENT
Start: 2024-03-20 | End: 2024-03-21 | Stop reason: HOSPADM

## 2024-03-20 RX ORDER — ACETAMINOPHEN 325 MG/1
650 TABLET ORAL EVERY 4 HOURS PRN
Status: DISCONTINUED | OUTPATIENT
Start: 2024-03-20 | End: 2024-03-21 | Stop reason: HOSPADM

## 2024-03-20 RX ORDER — OLANZAPINE 5 MG/1
10 TABLET, ORALLY DISINTEGRATING ORAL 2 TIMES DAILY PRN
Status: DISCONTINUED | OUTPATIENT
Start: 2024-03-20 | End: 2024-03-21 | Stop reason: HOSPADM

## 2024-03-20 ASSESSMENT — ACTIVITIES OF DAILY LIVING (ADL)
ADLS_ACUITY_SCORE: 37

## 2024-03-20 ASSESSMENT — COLUMBIA-SUICIDE SEVERITY RATING SCALE - C-SSRS
3. HAVE YOU BEEN THINKING ABOUT HOW YOU MIGHT KILL YOURSELF?: YES
5. HAVE YOU STARTED TO WORK OUT OR WORKED OUT THE DETAILS OF HOW TO KILL YOURSELF? DO YOU INTEND TO CARRY OUT THIS PLAN?: NO
1. IN THE PAST MONTH, HAVE YOU WISHED YOU WERE DEAD OR WISHED YOU COULD GO TO SLEEP AND NOT WAKE UP?: NO
6. HAVE YOU EVER DONE ANYTHING, STARTED TO DO ANYTHING, OR PREPARED TO DO ANYTHING TO END YOUR LIFE?: NO
2. HAVE YOU ACTUALLY HAD ANY THOUGHTS OF KILLING YOURSELF IN THE PAST MONTH?: YES
4. HAVE YOU HAD THESE THOUGHTS AND HAD SOME INTENTION OF ACTING ON THEM?: NO
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
## 2024-03-21 ENCOUNTER — TELEPHONE (OUTPATIENT)
Dept: BEHAVIORAL HEALTH | Facility: CLINIC | Age: 34
End: 2024-03-21

## 2024-03-21 VITALS
OXYGEN SATURATION: 99 % | BODY MASS INDEX: 33.47 KG/M2 | HEIGHT: 71 IN | RESPIRATION RATE: 16 BRPM | TEMPERATURE: 98.3 F | DIASTOLIC BLOOD PRESSURE: 79 MMHG | SYSTOLIC BLOOD PRESSURE: 112 MMHG | HEART RATE: 85 BPM

## 2024-03-21 PROBLEM — F33.1 MAJOR DEPRESSIVE DISORDER, RECURRENT EPISODE, MODERATE (H): Status: ACTIVE | Noted: 2024-03-21

## 2024-03-21 PROBLEM — F90.2 ATTENTION DEFICIT HYPERACTIVITY DISORDER, COMBINED TYPE: Status: ACTIVE | Noted: 2024-03-21

## 2024-03-21 PROBLEM — F63.0: Status: ACTIVE | Noted: 2024-03-21

## 2024-03-21 LAB
AMPHETAMINES UR QL SCN: NORMAL
BARBITURATES UR QL SCN: NORMAL
BENZODIAZ UR QL SCN: NORMAL
BZE UR QL SCN: NORMAL
CANNABINOIDS UR QL SCN: NORMAL
FENTANYL UR QL: NORMAL
OPIATES UR QL SCN: NORMAL
PCP QUAL URINE (ROCHE): NORMAL
SARS-COV-2 RNA RESP QL NAA+PROBE: NEGATIVE

## 2024-03-21 PROCEDURE — 87635 SARS-COV-2 COVID-19 AMP PRB: CPT | Performed by: EMERGENCY MEDICINE

## 2024-03-21 PROCEDURE — 250N000013 HC RX MED GY IP 250 OP 250 PS 637: Performed by: EMERGENCY MEDICINE

## 2024-03-21 PROCEDURE — 80307 DRUG TEST PRSMV CHEM ANLYZR: CPT | Performed by: EMERGENCY MEDICINE

## 2024-03-21 PROCEDURE — 99244 OFF/OP CNSLTJ NEW/EST MOD 40: CPT

## 2024-03-21 RX ORDER — DEXTROAMPHETAMINE SACCHARATE, AMPHETAMINE ASPARTATE, DEXTROAMPHETAMINE SULFATE AND AMPHETAMINE SULFATE 5; 5; 5; 5 MG/1; MG/1; MG/1; MG/1
40 TABLET ORAL DAILY
Status: DISCONTINUED | OUTPATIENT
Start: 2024-03-21 | End: 2024-03-21 | Stop reason: HOSPADM

## 2024-03-21 RX ORDER — DEXTROAMPHETAMINE SACCHARATE, AMPHETAMINE ASPARTATE MONOHYDRATE, DEXTROAMPHETAMINE SULFATE AND AMPHETAMINE SULFATE 3.75; 3.75; 3.75; 3.75 MG/1; MG/1; MG/1; MG/1
30 CAPSULE, EXTENDED RELEASE ORAL 2 TIMES DAILY
Status: DISCONTINUED | OUTPATIENT
Start: 2024-03-21 | End: 2024-03-21

## 2024-03-21 RX ORDER — DEXTROAMPHETAMINE SACCHARATE, AMPHETAMINE ASPARTATE MONOHYDRATE, DEXTROAMPHETAMINE SULFATE AND AMPHETAMINE SULFATE 3.75; 3.75; 3.75; 3.75 MG/1; MG/1; MG/1; MG/1
30 CAPSULE, EXTENDED RELEASE ORAL ONCE
Status: COMPLETED | OUTPATIENT
Start: 2024-03-21 | End: 2024-03-21

## 2024-03-21 RX ORDER — DEXTROAMPHETAMINE SACCHARATE, AMPHETAMINE ASPARTATE, DEXTROAMPHETAMINE SULFATE AND AMPHETAMINE SULFATE 5; 5; 5; 5 MG/1; MG/1; MG/1; MG/1
40 TABLET ORAL DAILY
Status: DISCONTINUED | OUTPATIENT
Start: 2024-03-21 | End: 2024-03-21

## 2024-03-21 RX ORDER — DEXTROAMPHETAMINE SACCHARATE, AMPHETAMINE ASPARTATE MONOHYDRATE, DEXTROAMPHETAMINE SULFATE AND AMPHETAMINE SULFATE 5; 5; 5; 5 MG/1; MG/1; MG/1; MG/1
60 CAPSULE, EXTENDED RELEASE ORAL EVERY MORNING
Status: DISCONTINUED | OUTPATIENT
Start: 2024-03-22 | End: 2024-03-21 | Stop reason: HOSPADM

## 2024-03-21 RX ORDER — BUPROPION HYDROCHLORIDE 150 MG/1
450 TABLET ORAL EVERY MORNING
Status: DISCONTINUED | OUTPATIENT
Start: 2024-03-21 | End: 2024-03-21 | Stop reason: HOSPADM

## 2024-03-21 RX ADMIN — BUPROPION 450 MG: 150 TABLET, EXTENDED RELEASE ORAL at 08:19

## 2024-03-21 RX ADMIN — DEXTROAMPHETAMINE SACCHARATE, AMPHETAMINE ASPARTATE MONOHYDRATE, DEXTROAMPHETAMINE SULFATE, AND AMPHETAMINE SULFATE 30 MG: 3.75; 3.75; 3.75; 3.75 CAPSULE, EXTENDED RELEASE ORAL at 10:00

## 2024-03-21 RX ADMIN — DEXTROAMPHETAMINE SACCHARATE, AMPHETAMINE ASPARTATE MONOHYDRATE, DEXTROAMPHETAMINE SULFATE AND AMPHETAMINE SULFATE 30 MG: 3.75; 3.75; 3.75; 3.75 CAPSULE, EXTENDED RELEASE ORAL at 10:00

## 2024-03-21 ASSESSMENT — COLUMBIA-SUICIDE SEVERITY RATING SCALE - C-SSRS
2. HAVE YOU ACTUALLY HAD ANY THOUGHTS OF KILLING YOURSELF?: NO
TOTAL  NUMBER OF INTERRUPTED ATTEMPTS SINCE LAST CONTACT: NO
ATTEMPT SINCE LAST CONTACT: NO
1. SINCE LAST CONTACT, HAVE YOU WISHED YOU WERE DEAD OR WISHED YOU COULD GO TO SLEEP AND NOT WAKE UP?: NO
SUICIDE, SINCE LAST CONTACT: NO
TOTAL  NUMBER OF ABORTED OR SELF INTERRUPTED ATTEMPTS SINCE LAST CONTACT: NO

## 2024-03-21 ASSESSMENT — ACTIVITIES OF DAILY LIVING (ADL)
ADLS_ACUITY_SCORE: 37

## 2024-03-21 NOTE — PHARMACY-ADMISSION MEDICATION HISTORY
Pharmacist Admission Medication History    Admission medication history is complete. The information provided in this note is only as accurate as the sources available at the time of the update.    Information Source(s): Patient via in-person    Pertinent Information: completed Med Rec per ED MD request. Uses Both XR and IR formulations of Adderall.    Changes made to PTA medication list:  Added: trazodone  Deleted: montelukast (patient reported NOT taking)  Changed: adderall IR from 10mg bid to 20mg bid, bupropion XL from 300mg daily to 450mg daily, clinda 1% lotion - takes prn, doxycycline 100mg bid - takes prn acne, triamcinolone cream -  takes prn.    Medication History Completed By: Quynh Gandhi RPH 3/20/2024 11:07 PM    PTA Med List   Medication Sig Note Last Dose    amphetamine-dextroamphetamine (ADDERALL XR) 30 MG 24 hr capsule Take 1 capsule (30 mg) by mouth 2 times daily  3/19/2024    amphetamine-dextroamphetamine (ADDERALL) 10 MG tablet Take 20 mg by mouth 2 times daily  3/19/2024    BUPROPION HCL ER, XL, PO Take 450 mg by mouth every morning Dose = 300mg tab + 150mg tab ----> 450mg/day*  3/19/2024    cetirizine (ZYRTEC) 10 MG tablet Take 10 mg by mouth daily as needed for allergies) Uses PRN prn    clindamycin (CLEOCIN T) 1 % external solution Apply topically 2 times daily 3/20/2024: Uses prn Past Month at uses prn, last time used ~1month ago    doxycycline hyclate (VIBRA-TABS) 100 MG tablet Take 1 tablet (100 mg) by mouth 2 times daily 3/20/2024: Uses PRN acne Past Month at uses prn acne, last time used ~1month ago    eletriptan (RELPAX) 40 MG tablet Take 1 tablet (40 mg) by mouth at onset of headache May repeat in 2 hours. Max 2 tablets/24 hours. Uses PRN PRN - last dose taken about 1 wk ago    sildenafil (VIAGRA) 100 MG tablet TAKE ONE TABLET BY MOUTH EVERY DAY AS NEEDED PRN prn    traZODone (DESYREL) 50 MG tablet Take 50 mg by mouth daily  3/19/2024    triamcinolone (KENALOG) 0.1 % external  cream Apply topically 2 times daily - do not use on face 3/20/2024: Uses prn Past Month at uses prn

## 2024-03-21 NOTE — DISCHARGE INSTRUCTIONS
"   Aftercare Plan    > Call [facility] to access mental health services (therapy, medication, insurance)     [facility]  [address] [place] [address]  [phone]  [email or web address]    > Follow up with your primary care provider       SEE ADDITIONAL PAGE FOR YOUR PERSONALIZED SAFETY PLAN        If I am feeling unsafe or I am in a crisis, I will:   Contact my established care providers   Call the [employer] Lifeline 988   Go to the nearest emergency room   Call 911   Your [facility] has a mental health crisis team you can call 24/7: [facility], [phone]        Crisis Text Line  Text 479227  You will be connected with a trained live crisis counselor to provide support.     Por nelly, texto  MARYLIN a 310776 o texto a 442-AYUDAME en [facility] Mental [facility] Warm Line  Peer to peer support  Monday thru Saturday, 12 pm to 10 pm  [phone] or [phone]  Text \"Support\" to 67389     [employer] [place] [employer] Mental Illness (EDY)  591.378.9307 or 1.888.EDY.HELPS        Mental Health Apps  My3  https://Teez.bypp.org/     VirtualHopeBox  https://mEgo.org/apps/virtual-hope-box/    Additional Information  Today you were seen by a licensed mental health professional through Triage and Transition services, [facility] (P)  for a crisis assessment in the Emergency Department at [facility].  It is recommended that you follow up with your established providers (psychiatrist, mental health therapist, and/or primary care doctor - as relevant) as soon as possible. Coordinators from [facility] will be calling you in the next 24-48 hours to ensure that you have the resources you need.  You can also contact [facility] coordinators directly at [phone]. You may have been scheduled for or offered an appointment with a mental health provider. [facility] maintains an extensive network of licensed behavioral health providers to connect patients with the " services they need.  We do not charge providers a fee to participate in our referral network.  We match patients with providers based on a patient's specific needs, insurance coverage, and location.  Our first effort will be to refer you to a provider within your care system, and will utilize providers outside your care system as needed.

## 2024-03-21 NOTE — PROGRESS NOTES
"Triage and Transition Services Extended Care Reassessment     Patient: Allan goes by \"Allan,\" uses he/him pronouns  Date of Service: March 21, 2024  Site of Service: [facility] EMERGENCY DEPT                               Patient was seen yes  Mode of Assessment: In person     Reason for Reassessment: suicidal ideation, worsening psychosocial stress    History of Patient's Original Emergency Room Encounter: From initial DEC assessment yesterday: \"Patient has prior admission at [facility] in 2020 for 2 days for similar presenting concerns with gambling and suicidal ideations with thought of harming self with a firearm.  Patient has a historical diagnosis of ADHD, major depressive disorder, and alcohol use disorder.  Patient expresses that he has not engaged in therapy in over 6 months.  Patient reports he has not been on medication for the past couple months due to lack of insurance.  Patient noted that his insurance has lapsed due to a large amount owed to the state and stated he is not able to restart state insurance until his excessive dues are paid.  Patient reported he is not eligible for insurance through his work at this time. Chart review shows history of traumatic brain injury.\"    Current Patient Presentation: Pt sitting in chair at bedside, dressed in own clothes.    Presentation Summary: Writer introduced self, explained role, and asked for permission to visit. Pt denied active SI/HI, plan, and intent. Pt denied A/V hallucinations and did not appear to be responding to internal stimuli during interview. Pt participated in a conversation about lethal means restriction. Pt denied immediate access to firearms. Pt reported that pta statements about SI were entirely related to financial stressors, and that he has not had any plans or intentions to actually kill himself. Pt reported that he lives with his parents at this time. Pt calm, cooperative, respectful, and patient with reassessment and " discharge process. Pt responded to questions when prompted with coherent answers. Pt was able to adequately engage in safety and aftercare planning, including signing ASIM for referral to [facility] Crisis Stabilization Services for follow up in the community and assistance accessing supports without health insurance.       Changes Observed Since Initial Assessment: decrease in presenting symptoms, patient/family request    Therapeutic Interventions Provided: Engaged in safety planning, Engaged in guided discovery, explored patient's perspectives and helped expand them through socratic dialogue., Explored motivation for treatment engagement, Explored motivation for behavioral change, Explored barriers to accessing outpatient services.    Current Symptoms: anxious sadness (feeling overwhelmed by current financial situation)    (no evidence of psychosis)      Mental Status Exam   Affect: Labile (would go from appearing sad, to anxious to then irritable)  Appearance: Appropriate  Attention Span/Concentration: Attentive  Eye Contact: Variable    Fund of Knowledge: Appropriate   Language /Speech Content: Fluent  Language /Speech Volume: Normal  Language /Speech Rate/Productions: Normal  Recent Memory: Variable  Remote Memory: Intact  Mood: Anxious, Irritable, Sad, Depressed  Orientation to Person: Yes   Orientation to Place: Yes  Orientation to Time of Day: Yes  Orientation to Date: Yes     Situation (Do they understand why they are here?): Yes  Psychomotor Behavior: Agitated, Normal  Thought Content: Clear  Thought Form: Intact, Goal Directed    Treatment Objective(s) Addressed: safety planning, identifying an appropriate aftercare plan, assessing safety, identifying additional supports    Patient Response to Interventions: acceptance expressed, verbalizes understanding    Progress Towards Goals:  Patient Reports Symptoms Are: improving  Patient Progress Toward Goals: is making progress  Comment: Pt denies SI, plan,  and intent and participated in discussion of lethal means restriction.    Case Management:  Obtained signed ASIM from pt for [facility] Crisis [facility] Services and coordinator to fax DEC assessment and psych consult note    C-SSRS Since Last Contact:   1. Wish to be Dead (Since Last Contact): No  2. Non-Specific Active Suicidal Thoughts (Since Last Contact): No     Actual Attempt (Since Last Contact): No  Has subject engaged in non-suicidal self-injurious behavior? (Since Last Contact): No  Interrupted Attempts (Since Last Contact): No  Aborted or Self-Interrupted Attempt (Since Last Contact): No  Suicide (Since Last Contact): No     Calculated C-SSRS Risk Score (Since Last Contact): No Risk Indicated    Plan: Final Disposition / Recommended Care Path: discharge  Plan for Care reviewed with assigned Medical Provider: yes  Plan for Care Team Review: provider, RN  Comments: Jevon Lebron MD, Dayanna GONZALEZ RN, and Rula HAYNES RN  Patient and/or validated legal guardian concurs: yes  Clinical Substantiation: After therapeutic assessment, intervention and aftercare planning by ED care team and LM and in consultation with consulting psychiatry provider and attending provider, the patient's circumstances and mental state were appropriate for outpatient management. It is the recommendation of this clinician that pt discharge with outpatient mental health supports. At this time, the pt is not presenting as an acute risk to self or others due to the following factors: Pt denies SI/HI, plan, intent; participated in discussion of lethal means restriction; stable housing; supportive family; verbalizes willingness to engage with outpatient supports as soon as can access; signed ASIM for referral to [facility] crisis stabilization services.     Legal Status: Legal Status at Admission: Voluntary/Patient has signed consent for treatment    Session Status: Time session started: 1632  Time session ended: 1638  Session Duration  (minutes): 6 minutes  Session Number: 0  Anticipated number of sessions or this episode of care: 0  Date of most recent diagnostic assessment:  (none found)    Session Start Time: 1628  Session Stop Time: 1634  CPT codes: Non-Billable  Time Spent: 6 minutes      CPT code(s) utilized: Non-Billable    Diagnosis:   Patient Active Problem List   Diagnosis Code    History anabolic steroid use Z92.241    Health Care Home Z76.89    ACP (advance care planning) Z71.89    Acne vulgaris L70.0    Other eczema L30.8    Other chronic rhinitis J31.0    History of asthma Z87.09    Erectile dysfunction, unspecified erectile dysfunction type N52.9    ADHD (attention deficit hyperactivity disorder), inattentive type F90.0    Post-concussion headache G44.309    Orthostatic hypotension I95.1    Hallucinations R44.3    Cerebral cysts G93.0    Suicidal ideation R45.851    Moderate major depression (H) F32.1    Traumatic brain injury with loss of consciousness, sequela (H24) S06.9X9S    Attention deficit hyperactivity disorder, combined type F90.2    Major depressive disorder, recurrent episode, moderate (H) F33.1    Gambling disorder, persistent F63.0       Primary Problem This Admission: Active Hospital Problems    Attention deficit hyperactivity disorder, combined type      *Major depressive disorder, recurrent episode, moderate (H)      Gambling disorder, persistent        SHANAE CLOUD, LADC   Licensed Mental Health Professional (LMHP), [facility]  [phone]

## 2024-03-21 NOTE — ED NOTES
intake nurse called and asked for patient's preference to facility; [facility] or [facility].     Writer spoke with patient and asked for preference, patient stated he does not want to go to any of them and stated he wanted to leave.     Writer informed provider, placed  on 72hr hold and DEC reevaluation. Patient informed and provider spoke with patient.

## 2024-03-21 NOTE — CONSULTS
"      Initial Psychiatric Consult   Consult date: March 21, 2024         Reason for Consult, requesting source:    SI  Requesting source: Carina Gutierrez    Labs and imaging reviewed. Patient seen and evaluated by LUIS MIGUEL Urias CNP          HPI:   Allan Leon is a 33 year old male presenting with mental health problem and SI on 3/20. Patient reports one of his parents called 911 yesterday after he made comments of \" wanting to kill myself\". Per collateral, pts mom has noted an increase in emotional dysregulation. Dad has noted increase in anxiety, agitation related to financial difficulties. Patient was placed on a 72HH 3/20 due to concerns of SI.     On psychiatric interview today, patient had bright affect with full range. He reports that he is remorseful he made suicidal comments, stating that he has and never would actually attempt to hurt himself. He states that his financial stressors have led him to be uninsured to the point where he cannot afford his medications nor his therapist whom he has a great relationship with. He appropriately acknowledges his parents concerns, and states he appreciates that they care. He continues to adamantly deny SI, HI, AVH and denies recent alcohol or drug use.         Past Psychiatric History:   Prior diagnoses: ADHD, alcohol abuse, gambling addiction      Hospitalizations:  One for ~24 hours in November 2020 for suicidal ideation related to financial stressors     Suicide attempts:  Patient reported no previous suicide attempts.     Self-injurious behavior: none     Violence: none     ECT/TMS: none     Past medications: Adderall, Trazodone, wellbutrin         Substance Use and History:   History of alcohol abuse --reports decreased etoh intake recently.     Prior CD treatments: CD treatment and gambling addiction at [facility]         Past Medical History:   PAST MEDICAL HISTORY:   Past Medical History:   Diagnosis Date    Asthma 1/1/2010       PAST SURGICAL " HISTORY:   Past Surgical History:   Procedure Laterality Date    APPENDECTOMY  11/01/2011    COSMETIC SURGERY  4/21/1995    Birth rin removal    PYLOROTOMY  1992             Family History:   FAMILY HISTORY:   Family History   Problem Relation Age of Onset    Diabetes No family hx of     Hypertension No family hx of     Hyperlipidemia No family hx of        Family Psychiatric History: unknown         Social History:   SOCIAL HISTORY:   Social History     Tobacco Use    Smoking status: Former    Smokeless tobacco: Former     Types: Chew     Quit date: 2/2/2021    Tobacco comments:     quit smoking 6/1/2022   Substance Use Topics    Alcohol use: Not Currently     Alcohol/week: 0.0 - 4.0 standard drinks of alcohol     Comment: 1-2 days week/4 drinks/day                Physical ROS:   The 10 point Review of Systems is negative other than noted in the HPI or here.           Medications:      [START ON 3/22/2024] amphetamine-dextroamphetamine  60 mg Oral QAM    amphetamine-dextroamphetamine  40 mg Oral Daily    buPROPion  450 mg Oral QAM              Allergies:     Allergies   Allergen Reactions    Nickel Rash    Tape [Adhesive Tape] Rash          Labs:     Recent Results (from the past 48 hour(s))   Comprehensive metabolic panel    Collection Time: 03/20/24 10:46 PM   Result Value Ref Range    Sodium 137 135 - 145 mmol/L    Potassium 3.9 3.4 - 5.3 mmol/L    Carbon Dioxide (CO2) 25 22 - 29 mmol/L    Anion Gap 9 7 - 15 mmol/L    Urea Nitrogen 10.9 6.0 - 20.0 mg/dL    Creatinine 0.84 0.67 - 1.17 mg/dL    GFR Estimate >90 >60 mL/min/1.73m2    Calcium 8.7 8.6 - 10.0 mg/dL    Chloride 103 98 - 107 mmol/L    Glucose 96 70 - 99 mg/dL    Alkaline Phosphatase 39 (L) 40 - 150 U/L    AST 24 0 - 45 U/L    ALT 33 0 - 70 U/L    Protein Total 6.1 (L) 6.4 - 8.3 g/dL    Albumin 4.1 3.5 - 5.2 g/dL    Bilirubin Total 0.3 <=1.2 mg/dL   CBC with platelets and differential    Collection Time: 03/20/24 10:46 PM   Result Value Ref Range     "WBC Count 7.1 4.0 - 11.0 10e3/uL    RBC Count 4.79 4.40 - 5.90 10e6/uL    Hemoglobin 14.7 13.3 - 17.7 g/dL    Hematocrit 42.6 40.0 - 53.0 %    MCV 89 78 - 100 fL    MCH 30.7 26.5 - 33.0 pg    MCHC 34.5 31.5 - 36.5 g/dL    RDW 12.3 10.0 - 15.0 %    Platelet Count 211 150 - 450 10e3/uL    % Neutrophils 39 %    % Lymphocytes 49 %    % Monocytes 9 %    % Eosinophils 2 %    % Basophils 1 %    % Immature Granulocytes 0 %    NRBCs per 100 WBC 0 <1 /100    Absolute Neutrophils 2.8 1.6 - 8.3 10e3/uL    Absolute Lymphocytes 3.4 0.8 - 5.3 10e3/uL    Absolute Monocytes 0.7 0.0 - 1.3 10e3/uL    Absolute Eosinophils 0.2 0.0 - 0.7 10e3/uL    Absolute Basophils 0.1 0.0 - 0.2 10e3/uL    Absolute Immature Granulocytes 0.0 <=0.4 10e3/uL    Absolute NRBCs 0.0 10e3/uL   Urine Drug Screen Panel    Collection Time: 03/21/24  5:14 AM   Result Value Ref Range    Amphetamines Urine Screen Negative Screen Negative    Barbituates Urine Screen Negative Screen Negative    Benzodiazepine Urine Screen Negative Screen Negative    Cannabinoids Urine Screen Negative Screen Negative    Cocaine Urine Screen Negative Screen Negative    Fentanyl Qual Urine Screen Negative Screen Negative    Opiates Urine Screen Negative Screen Negative    PCP Urine Screen Negative Screen Negative   Asymptomatic COVID-19 Virus (Coronavirus) by PCR Nasopharyngeal    Collection Time: 03/21/24  9:59 AM    Specimen: Nasopharyngeal; Swab   Result Value Ref Range    SARS CoV2 PCR Negative Negative          Physical and Psychiatric Examination:     /77   Pulse 84   Temp 98.4  F (36.9  C) (Oral)   Resp 16   Ht 1.803 m (5' 11\")   SpO2 97%   BMI 33.47 kg/m    Weight is 0 lbs 0 oz  Body mass index is 33.47 kg/m .    Physical Exam:  I have reviewed the physical exam as documented by by the medical team and agree with findings and assessment and have no additional findings to add at this time.    Mental Status Exam:    Appearance: awake, alert and adequately " groomed  Attitude:  cooperative  Eye Contact:  good  Mood:  good  Affect:  appropriate and in normal range and mood congruent  Speech:  clear, coherent  Language: Fluent in english   Psychomotor Behavior:  no evidence of tardive dyskinesia, dystonia, or tics  Thought Process:  logical, linear, and goal oriented  Associations:  no loose associations  Thought Content:  no evidence of suicidal ideation or homicidal ideation and no evidence of psychotic thought  Insight:  fair  Judgement:  fair  Oriented to:  time, person, and place  Attention Span and Concentration:  intact  Recent and Remote Memory:  intact  Fund of Knowledge: Appropriate   Gait and Station: baseline                DSM-5 Diagnosis:   ADHD  Gambling disorder, persistent   Unspecified mood disorder (R/o adjustment disorder with mixed features vs. MDD episode)           Assessment:   Allan is a 33 year old male with a history of the above diagnoses woh presents to the ED for mental health assessment given statements of suicidal ideation in the context of medication non-adherence due to financial stressors.  Upon assessment, he reported his SI was contingent upon financial stressors, he has had no attempts and adamantly denies SI at this time, states those statements were out of frustration. His biggest concern is lack of insurance and inability to see his therapist and get his meds -- all of which he is very agreeable to. Does not meet criteria for civil commitment given wanting to engage appropriately in treatment, lack of active suicidal ideation with plan or intent and ability to appropriately participate in safety planning. His overall risk is mitigated by absence of past attempts and lack of firearms in his residence. Based on all available evidence including the factors cited above, Allan Leon does not appear to be an imminent danger to self or others and is appropriate for outpatient level of care.     Discussed my findings and  recommendations with the listed attending provider and extended care therapist.           Summary of Recommendations:     Continue Adderall and Trazodone outpatient if financially feasible   Return to therapist outpatient as soon as possible   Will discontinue 72HH -- patient is appropriate for outpatient level of care         Evonne Mckee, YVONNEP-BC  Consult/Liaison Psychiatry   [facility]

## 2024-03-21 NOTE — TELEPHONE ENCOUNTER
8:00 AM: Luisana Quevedo informed that they are reviewing for availability and said to check back around 9am.    8:19 AM: Intake called [facility] ED to request Covid and to see if Pt is willing to go [facility]. Pt expressed to RN that he just wants to go home.    8:35 AM: ALIVIA Gottlieb called to inform that Provider confirmed that Pt has been placed on a 72HH.    9:52 AM: Intake called to remind RN that Covid lab is needed for outside locations to review.    10:00 AM: Intake called [facility] and spoke with Tracey. Clif can review.    10:46 AM: Intake faxed Pt face sheet, labs, 72HH and ED notes.    11:01 AM: Intake faxed neg Covid.    1:10 PM: Intake called [facility] to follow-up on review. Intake unable to get a hold of person reviewing.    1:18 PM: Intake called [facility] and was informed that they don't have any  beds available.    1:20 PM: Intake called [facility] and was informed that both [facility] and [facility] are on delay, with no beds available.    1:22 PM: [facility] does not have beds available.    1:23 PM: Intake called [facility] and they are able to review.    1:39 PM: Intake faxed Pt info to [place] review.        R: [place] [facility] Bed Call Log 3/21/24  @7:22 am:    Intake has called facilities that have not updated the bed status within the last 12 hours.                                 Adults:  [facility] is at capacity            [facility] is posting 0 beds. [phone]; per call at 7:23 am to Lara, they are at cap.    [facility] is posting 2 beds. Negative covid required              [facility] is posting 0 beds. Neg covid. No high school/Susan-psych. [phone]; per call at 7:24 am to Sarthak, they are at cap.    [facility] is posting 0 beds. [phone]   [facility] is posting 0 beds. [phone]    [facility] is posting 0 beds.    [facility] ([facility]) is posting 1 bed. Low acuity. [phone]       [facility] is posting 1  bed. LOW acuity ONLY. Mixed unit 12+. Negative covid- [phone]   [facility] has 2 beds posted. No aggression. Low acuity. Negative Covid. Low acuity    [facility] is posting 0 beds. Negative covid. [phone]    [facility] ([facility]) is posting 1 bed. Low acuity only. Neg covid.  [phone]    [facility] is posting 1 bed. Low acuity. No current aggression.     [facility] ([employer]) is posting 0 beds available. Negative covid.  [phone].       [facility] is posting 1 bed. Low acuity. 72 HH hold preferred. Negative covid required. [phone]; per call at 7:36 am to Olivia, she does not know how many beds they have and said to call back later.    [facility] ([facility]) is posting 1 bed. Low acuity only. Neg covid.  [phone]; per call at 7:40 am to Jorden, they have 2 beds avail.    [facility] [place] is posting 1 bed.  Negative covid required.   Vol only, No history of aggression, violence, or assault. No sexual offenders. No 72 HH holds. [phone]        [facility] is posting 2 beds. Negative covid required.  (Must have the cognitive ability to do programming. No aggressive or violent behavior or recent HX in the last 2 yrs. MH must be primary.) Always low acuity. [phone]    [facility] has 0 beds posted. Negative covid required.  Low acuity only. Violence and aggression capped.  [phone]    [facility] is posting 0 beds. Low acuity, Negative covid required. [phone]; per call at 7:43 am, on hold for over 4 minutes. Will call back later.    [facility] posting 0 beds Negative covid required.  [phone]; per Adilene's call to [facility] around 6 am, 1 stepdown/low acuity bed avail.    [facility] [place] is posting 0 beds. Negative covid. LOW acuity. (No lines, drains, or tubes, oxygen, CPAP, IV, etc.) Must Have a Ride Home. [phone]    [facility] is posting 1 bed. Negative covid. (No. lines, drains, or tubes, oxygen, CPAP, IV, etc.). [phone]; per call at 7:48 am to Jessica, they have 1 bed avail, low acuity only.        Pt remains on the work list pending appropriate bed availability

## 2024-03-21 NOTE — PLAN OF CARE
Allanstephon Pulidoson  March 20, 2024  Plan of Care Hand-off Note     Patient Care Path: inpatient mental health    Plan for Care:   Patient is a 33-year-old male who was brought into the emergency department by EMS after parents called 911 due to patient having ongoing emotional dysregulation and making comments about wanting to harm self with a firearm.  Patient has historical diagnosis of attention deficit disorder, gambling disorder, and depression.  At time of encounter patient appears guarded and at times evasive with information at first noting he was having passive suicidal ideations to then denying any ideations.  Patient does admit to making comments about wanting to end his life though denies these as ideations stating he says this due to his severe depression.  Patient denies that he would harm himself with a firearm and reports he does not own a firearm.  Extensive collateral was obtained from family, there is ongoing concerns for physical outbursts, severe depression and ongoing suicidal ideation comments noting plans of obtaining a firearm and ending his life or getting into a vehicle accident.  Patient appears to be at high risk taking into consideration his history and ongoing gambling concerns, his dire financial concerns, his lack of insurance,  inability to access outpatient services,  his limited insight and evasiveness.  Extensive clinical consultation was done in regards to patient's risk with firearms.  At this time it was determined that there no is further follow up needed in this regard, as patient is denying a plan to harm himself with a firearm, is reporting he does not own a firearm, and family noting ability to mitigate risk within the household.  After extensive discussion between emergency department provider, patient and this licensed mental professional, patient was open to voluntary admission for mental health however required numerous reinforcement and education to encourage  voluntary admission.  Patient was educated on 72-hour hold though noted that he would be willing to stay voluntarily at this time should he be able to restart his medications while boarding for placement. MD notifed, and will resume medication regiment as appropriate.    Identified Goals and Safety Issues: Goals: start medications, Admission for MH, secure financially stable out-patient mental health services. Safety: Past ideations, recent ideations with comments around wanting to harm self with firearm or vehicle. History of gambling, history of substance use concerns with alcohol. On going dysregulation resulting in throwing objects and hitting walls. Can become verbally abrasive. Can be guarded and evasive, requires rediretion and encouragement.    Overview:  Zane Leon (Father)  [phone] (Mobile)   Verito Leon (Mother) [phone] (Mobile)    Legal Status: Legal Status at Admission: Voluntary/Patient has signed consent for treatment    Psychiatry Consult: N/A       Updated  RN/MD regarding plan of care.           Rachele Martin [facility]

## 2024-03-21 NOTE — TELEPHONE ENCOUNTER
S: [facility] ED , DEC  Rachele  calling at 10:53 PM   about a 33 year old/Male presenting with SI      B: Pt arrived via EMS. Presenting problem, stressors: Pt has a gambling concern and financial strain. Pt cannot access OP MH services d/t not having current insurance. Parents called 911 d/t patient making suicidal threats    Pt affect in ED: Labile  Pt Dx: Major Depressive Disorder and ADHD  Previous IPMH hx? Yes: 2021  Pt endorses SI with a plan to Harm himself with firearm     Hx of suicide attempt? No  Pt denies SIB  Pt denies HI   Pt denies hallucinations .   Pt RARS Score: 4    Hx of aggression/violence, sexual offenses, legal concerns, Epic care plan? describe: Punching holes in walls, throwing things at home.   Current concerns for aggression this visit? Yes: Irritable   Does pt have a history of Civil Commitment? No  Is Pt their own guardian? Yes    Pt is prescribed medication. Is patient medication compliant? Yes, but due to MH concerns patient is missing doses   Pt denies OP services   CD concerns: None  Acute or chronic medical concerns: NO  Does Pt present with specific needs, assistive devices, or exclusionary criteria? None      Pt is ambulatory  Pt is able to perform ADLs independently      A: Pt to be reviewed for [facility] admission. Pt is Voluntary  Preferred placement: [facility]    COVID Symptoms: No  If yes, COVID test required   Utox: Ordered, not yet collected   CMP: In process  CBC: In process  HCG: N/A    R: Patient cleared and ready for behavioral bed placement: Yes  Pt placed on IP worklist? Yes    Does Patient need a Transfer Center request created? Yes, writer completed Transfer Center request at: 10:59 PM

## 2024-03-21 NOTE — ED NOTES
Patient wondering if he should call his parents for a ride yet, did let him know I would reach out to Samara to find out how soon he would be going. Waiting for reply from Samara.

## 2024-03-21 NOTE — ED NOTES
IP MH Referral Acuity Rating Score (RARS)    LMHP complete at referral to IP MH, with DEC; and, daily while awaiting IP MH placement. Call score to PPS.  CRITERIA SCORING   New 72 HH and Involuntary for IP MH (not adolescent) 0/1   Boarding over 24 hours 0/1   Vulnerable adult at least 55+ with multiple co morbidities; or, Patient age 11 or under 0/1   Suicide ideation without relief of precipitating factors 1/1   Current plan for suicide 0/1   Current plan for homicide 0/1   Imminent risk or actual attempt to seriously harm another without relief of factors precipitating the attempt 0/1   Severe dysfunction in daily living (ex: complete neglect for self care, extreme disruption in vegetative function, extreme deterioration in social interactions) 1/1   Recent (last 2 weeks) or current physical aggression in the ED 1/1   Restraints or seclusion episode in ED 0/1   Verbal aggression, agitation, yelling, etc., while in the ED 1/1   Active psychosis with psychomotor agitation or catatonia 0/1   Need for constant or near constant redirection (from leaving, from others, etc).  0/1   Intrusive or disruptive behaviors 0/1   TOTAL Acuity Total Score: 4

## 2024-03-21 NOTE — CONSULTS
"Diagnostic Evaluation Consultation  Crisis Assessment    Patient Name: Allan Leon  Age:  33 year old  Legal Sex: male  Gender Identity: male  Pronouns:   Race: White  Ethnicity: Not  or   Language: English      Patient was assessed: Virtual: [facility] Crisis Assessment Start Time: 2120 (9:20PM-9:44PM) Crisis Assessment Stop Time: 2247 (10:34PM-10:42PM and again from10:45PM-10:47PM)  Patient location: [facility] EMERGENCY DEPT                             ED05    Referral Data and Chief Complaint  Allan Leon presents to the ED via EMS. Patient is presenting to the ED for the following concerns:  .   Factors that make the mental health crisis life threatening or complex are:  Patient reports one of his parents called 911 today after he made comments of \" wanting to kill myself\".  During encounter patient stated that he does not remember specific comments he made while speaking with his parents.  Licensed mental health professional noted that there were concerns for patient making comments about wanting to harm self with a firearm, patient denied remembering making such comments.  Patient stated \" I do not want to kill myself but I am just depressed\".  Patient repeatedly would state \"I feel dead inside\" and reported this is due to an extensive amount of debt that he has accrued due to gambling.  When asked about suicidal ideations historically patient reported yes, when asked about suicidal ideations recently patient at first reported yes to passive suicidal ideations during such conversation around frequency and duration patient appeared to become irritable and upset and then stated  I should have just lied about the passive thing , and was no longer willing to engage in the suicide screening.  Throughout encounter patient often made comments such as \" I am the most depressed I have ever been\" and would frequently report that due to his lack of insurance and inability to " "obtain medications and therapy he believes this is exacerbating his symptoms. Pt noted history of gambling, which is playing a role in his current symptoms. Patient frequently reported \" I am in an unrealistic amount of debt\" expressing that he does not feel he can get out of it \" anytime soon\" which is very debilitating to him.Throughout encounter patient frequently denied making comments about wanting to harm himself through the method of a vehicle or firearm.  Patient would state that he does not remember making such comments to later reporting \"I have never though about actually kill myself, have I said it, yes but did I mean it no, I meant I felt dead inside .  During encounter patient would show bouts of sadness, anxiety and then irritability.  Patient often appeared minimally responsive and at times evasive providing conflicting information or retracting his statements in regards to suicidal ideation. Please review collateral contact information which notes recent concerns for emotional dysregulation and suicidal ideation..      Informed Consent and Assessment Methods  Explained the crisis assessment process, including applicable information disclosures and limits to confidentiality, assessed understanding of the process, and obtained consent to proceed with the assessment.  Assessment methods included conducting a formal interview with patient, review of medical records, collaboration with medical staff, and obtaining relevant collateral information from family and community providers when available.  : done     Patient response to interventions: needs reinforcement, unacceptance expressed, verbalizes understanding (At first reported he was not voluntary for care and would need a 72 hour hold, then after extensive encouragement and education, stated he would be willing to stay voluntary.)  Coping skills were attempted to reduce the crisis:  Unable to note     History of the Crisis   Patient has prior " "admission at [facility] in 2020 for 2 days for similar presenting concerns with gambling and suicidal ideations with thought of harming self with a firearm.  Patient has a historical diagnosis of ADHD, major depressive disorder, and alcohol use disorder.  Patient expresses that he has not engaged in therapy in over 6 months.  Patient reports he has not been on medication for the past couple months due to lack of insurance.  Patient noted that his insurance has lapsed due to a large amount owed to the state and stated he is not able to restart state insurance until his excessive dues are paid.  Patient reported he is not eligible for insurance through his work at this time. Chart review shows history of traumatic brain injury.    Brief Psychosocial History  Family:  Single, Children no  Support System:  Parent(s), Sibling(s) (brother and parents.)  Employment Status:  employed part-time  Source of Income:  salary/wages  Financial Environmental Concerns:  insurance, none, insurance inadequate  Current Hobbies:  other (see comments), exercise/fitness, outdoor activities (bowling, pickle ball, working out.)  Barriers in Personal Life:  mental health concerns, lack of motivation, behavioral concerns    Significant Clinical History  Current Anxiety Symptoms:   (\"I dont really get anxiety\")  Current Depression/Trauma:  hopelessness, helplessness, sadness, irritable, low self esteem, excessive guilt, thoughts of death/suicide  Current Somatic Symptoms:   (denied)  Current Psychosis/Thought Disturbance:  inattentive, agitation, forgetful  Current Eating Symptoms:  increased appetite  Chemical Use History:  Alcohol: Other (comments) (ocassional drinking. Twice in past month.)  Last Use:: 03/06/24 (two beers at last use)  Benzodiazepines: None  Opiates: None  Cocaine: None  Marijuana: None  Other Use: None  Withdrawal Symptoms:  (denied)  Addictions: Gambling (pt reports prior gambling concerns in the past stating \"that is what " "got me into a lot of finacial debt\")   Past diagnosis:  Depression, ADHD, Other, Substance Use Disorder (Gambling Disorder)  Family history:  ADHD, Depression, Anxiety Disorder  Past treatment:  Individual therapy, Psychiatric Medication Management, Inpatient Hospitalization, Primary Care  Details of most recent treatment:  Patient expresses that he was seeing a psychiatrist named Skylar through [facility] in [place]  Patient reported he has not seen the provider due to lack of insurance and have not spoken to the provider in the past several months.  Patient stated he is unable to afford appointments or medications at this time.  Patient reported he does have a primary care Dr. Madyson Rincon through [facility].  Other relevant history:  Pt reports he lives with his parents in [place]. Pt stated he is currently working at [employer], part-time, roughly 30 hours. Pt stated he owes 1400 to insurance and stated \"before I get a new one, I have to pay that off\". Pt denied legal concerns. No medical concerns.       Collateral Information  Is there collateral information: Yes     Collateral information name, relationship, phone number:  Zane Perez, Father, [phone]    What happened today: Per father, pt has been \"threatening to kill himself for the past few weeks here\". Per father, \"it is really affecting him mentally\". Per father, he \"gets pretty wild and will hit the walls and doors\", this occurs a few times a week, has left wholes in the wall in the past and cracked a few doors. Per father \"he is burning up inside\" and shared pt has a \"lot of financial difficulties\". Per father, today he called 911. Father stated tonight over dinner, who could tell pt was not feeling well. Per father, pt appeared to have anxiety and \"you could see it building\" though was unable to provide details on this outside of pt frequently stating \"every day is the same\".     What is different " "about patient's functioning: Pt called Crisis line two days ago, per parent support, though said it was not helpful. Father noted pt is frequently isolating in his room and sleeping. Father stated pt is not active like he usually is. Father noted when pt was driving, he was threatening to drive off of a bridge. Father stated pt has daily been speaking about how he would kill himself. Father noted pt's room is a complete disaster due to his unmedicated ADHD. Father stated pt will have \"Screaming fits\" and then 15 minutes later \"act like nothing happened\".     Concern about alcohol/drug use:  no    What do you think the patient needs:  n/a    Has patient made comments about wanting to kill themselves/others: yes    If d/c is recommended, can they take part in safety/aftercare planning:       Additional collateral information:  Per father, pt has a gambling problem, through \"playing the stock market\". Father noted pt does not have a car at this time, has not for the past few months, will drive fathers car. Father noted pt works at night. No drug or alcohol concerns. Father noted pt frequently bowls. No aggression concerns towards others. Per father pt does not have access to a firearm in the home. Father noted pt's friends have \" a lot of guns\" and \"if he wanted to borrow one, he could\". Father noted concerns for Chronic Traumatic Encephalopathy, stating pt has long history of concussions. [facility] spoke with patients motherVerito. Mother stated two days ago, pt \"went bazerk  saying \"I just want to be dead, I hate my life, I want it to be done\". Mother noted tonight, she told pt she was proud of patient of attending his 2AM work shift, and then pt became dysregulated and began crying. Mother noted pt then began to \"get mad\" was throwing objects and yelling. Mother stated \"he just lost it\" and was stating \"I just don't want to live anymore and I mean it and I am so serious\" and stated \"I am so done\". Pt's father then " "stated he felt as though he needed to call police and then pt then ran to his room. Mother noted pt calmed when police arrived though pt did not want to go to the hospital. Per mother, she thinks pt would benefit from therapy. Mother noted she has reached out to crisis lines in the past, though pt often states it is not helpful as he doesn't have insurance. Mother shared pt has taken the cat  the past and bang it against the floor. Mother stated pt frequently gets upset with limited or unknown triggers. Mother made it clear that pt has not been physically aggressive towards her or harmed her. mother noted pt has concerns with gambling. Mother noted  does have a firearm in the house though ammunition is . Mother relayed that she could remove her husbands firearm assess to assure a safety for pt upon discharge when stable. Mother noted she is fearful of pt s dsyregulation.     Risk Assessment  [place] Suicide Severity Rating Scale Full Clinical Version:  Suicidal Ideation  Q1 Wish to be Dead (Lifetime): Yes  Q2 Non-Specific Active Suicidal Thoughts (Lifetime): Yes  3. Active Suicidal Ideation with any Methods (Not Plan) Without Intent to Act (Lifetime): Yes  Q4 Active Suicidal Ideation with Some Intent to Act, Without Specific Plan (Lifetime): No  Q5 Active Suicidal Ideation with Specific Plan and Intent (Lifetime): No  Q6 Suicide Behavior (Lifetime): no     Suicidal Behavior (Lifetime)  Actual Attempt (Lifetime): No  Has subject engaged in non-suicidal self-injurious behavior? (Lifetime): No  Interrupted Attempts (Lifetime): No  Aborted or Self-Interrupted Attempt (Lifetime): No  Preparatory Acts or Behavior (Lifetime): No    [place] Suicide Severity Rating Scale Recent:   Suicidal Ideation (Recent)  Q1 Wished to be Dead (Past Month): yes (\"very passive\")  Q2 Suicidal Thoughts (Past Month): yes (Pt denies this though collateral from parents notes otherwise)  Q3 Suicidal Thought Method: yes " "(PT denies this, though mother and father note pt was making comments about wanting to harm self with firearm or vehical.)  Q4 Suicidal Intent without Specific Plan: yes (Pt denies though collateral reports that he has stated he will act on these plans.)  Q5 Suicide Intent with Specific Plan: no  Level of Risk per Screen: high risk  Intensity of Ideation (Recent)  Most Severe Ideation Rating (Past 1 Month): 5  Frequency (Past 1 Month):  (Pt refused to answer.)  Duration (Past 1 Month):  (Pt refused to answer.)  Controllability (Past 1 Month):  (Pt refused to answer.)  Deterrents (Past 1 Month): Deterrents definitely stopped you from attempting suicide (\"still moments that I am happy\" and stated \"I know I could be happy if I got insurance and got my medications I needed\")  Reasons for Ideation (Past 1 Month): Equally to get attention, revenge, or a reaction from others and to end/stop the pain  Suicidal Behavior (Recent)  Actual Attempt (Past 3 Months): No  Has subject engaged in non-suicidal self-injurious behavior? (Past 3 Months): No  Interrupted Attempts (Past 3 Months): No  Aborted or Self-Interrupted Attempt (Past 3 Months): No  Preparatory Acts or Behavior (Past 3 Months): No    Environmental or Psychosocial Events: history of TBI, challenging interpersonal relationships, barriers to accessing healthcare, helplessness/hopelessness, impulsivity/recklessness, excessive debt, poor finances  Protective Factors: Protective Factors: lives in a responsibly safe and stable environment, reality testing ability    Does the patient have thoughts of harming others? Feels Like Hurting Others: no  Previous Attempt to Hurt Others: no  Current presentation: Irritable  Violence Threats in Past 6 Months: Per collateral patient has not threatened to harm other people.  Patient has not engaged in physical aggression towards other people per collateral report.  Patient does not report any thoughts or urges to harm others.  Current " Violence Plan or Thoughts: Per collateral patient has not threatened to harm other people.  Patient has not engaged in physical aggression towards other people per collateral report.  Patient does not report any thoughts or urges to harm others.  Is the patient engaging in sexually inappropriate behavior?: no  Duty to warn initiated: no  Duty to warn details: Patient denies any active plan or intent to harm someone else.    Is the patient engaging in sexually inappropriate behavior?  no        Mental Status Exam   Affect: Labile (would go from appearing sad, to anxious to then irritable)  Appearance: Appropriate  Attention Span/Concentration: Attentive  Eye Contact: Variable    Fund of Knowledge: Appropriate   Language /Speech Content: Fluent  Language /Speech Volume: Normal  Language /Speech Rate/Productions: Normal  Recent Memory: Variable  Remote Memory: Intact  Mood: Anxious, Irritable, Sad, Depressed  Orientation to Person: Yes   Orientation to Place: Yes  Orientation to Time of Day: Yes  Orientation to Date: Yes     Situation (Do they understand why they are here?): Yes  Psychomotor Behavior: Agitated, Normal  Thought Content: Clear  Thought Form: Intact, Goal Directed          Medication  Psychotropic medications:   Medication Orders - Psychiatric (From admission, onward)      Start     Dose/Rate Route Frequency Ordered Stop    03/20/24 2238  OLANZapine zydis (zyPREXA) ODT tab 10 mg         10 mg Oral 2 TIMES DAILY PRN 03/20/24 2238 03/20/24 2238  LORazepam (ATIVAN) tablet 1 mg         1 mg Oral EVERY 8 HOURS PRN 03/20/24 2238               Current Care Team  Patient Care Team:  Madyson Gay PA-C as PCP - General (Physician Assistant)  [facility], MD (Pediatrics)  Madyson Gay PA-C as Assigned PCP  April Amin MD as Assigned Neuroscience Provider  Kandy Ruby [facility] as Assigned [facility] Pharmacist  Skylar Galeana, APRN, CNP, PMHNP-BC? as  Psychiatrist    Diagnosis  Patient Active Problem List   Diagnosis Code    History anabolic steroid use Z92.241    Health Care Home Z76.89    ACP (advance care planning) Z71.89    Acne vulgaris L70.0    Other eczema L30.8    Other chronic rhinitis J31.0    History of asthma Z87.09    Erectile dysfunction, unspecified erectile dysfunction type N52.9    ADHD (attention deficit hyperactivity disorder), inattentive type F90.0    Post-concussion headache G44.309    Orthostatic hypotension I95.1    Hallucinations R44.3    Cerebral cysts G93.0    Suicidal ideation R45.851    Moderate major depression (H) F32.1    Traumatic brain injury with loss of consciousness, sequela (H24) S06.9X9S    Attention deficit hyperactivity disorder, combined type F90.2    Major depressive disorder, recurrent episode, moderate (H) F33.1    Gambling disorder, persistent F63.0       Primary Problem This Admission  Active Hospital Problems    Attention deficit hyperactivity disorder, combined type      *Major depressive disorder, recurrent episode, moderate (H)      Gambling disorder, persistent        Clinical Summary and Substantiation of Recommendations   Patient is a 33-year-old male who was brought into the emergency department by EMS after parents called 911 due to patient having ongoing emotional dysregulation and making comments about wanting to harm self with a firearm.  Patient has historical diagnosis of attention deficit disorder, gambling disorder, and depression.  At time of encounter patient appears guarded and at times evasive with information at first noting he was having passive suicidal ideations to then denying any ideations.  Patient does admit to making comments about wanting to end his life though denies these as ideations stating he says this due to his severe depression.  Patient denies that he would harm himself with a firearm and reports he does not own a firearm.  Extensive collateral was obtained from family, there is  ongoing concerns for physical outbursts, severe depression and ongoing suicidal ideation comments noting plans of obtaining a firearm and ending his life or getting into a vehicle accident.  Patient appears to be at high risk taking into consideration his history and ongoing gambling concerns, his dire financial concerns, his lack of insurance,  inability to access outpatient services,  his limited insight and evasiveness.  Extensive clinical consultation was done in regards to patient's risk with firearms.  At this time it was determined that there no is further follow up needed in this regard, as patient is denying a plan to harm himself with a firearm, is reporting he does not own a firearm, and family noting ability to mitigate risk within the household.  After extensive discussion between emergency department provider, patient and this licensed mental professional, patient was open to voluntary admission for mental health however required numerous reinforcement and education to encourage voluntary admission.  There is limited factors to mitigate risk as pt does not have stable insurance to secure out-patient services. Patient was educated on 72-hour hold though noted that he would be willing to stay voluntarily at this time should he be able to restart his medications while boarding for placement. MD notifed, and will resume medication regiment as appropriate.       Imminent risk of harm: Suicidal Behavior  Severe psychiatric, behavioral or other comorbid conditions are appropriate for management at inpatient mental health as indicated by at least one of the following: Psychiatric Symptoms, Impaired impulse control, judgement, or insight  Severe dysfunction in daily living is present as indicated by at least one of the following: Other evidence of severe dysfunction  Situation and expectations are appropriate for inpatient care: Voluntary treatment at lower level of care is not feasible, Around-the-clock  medical and nursing care to address symptoms and initiate intervention is required, Biopsychosocial stresses potentially contributing to clinical presentation (co morbidities) have been assessed and are absent or manageable at proposed level of care, Patient management/treatment at lower level of care is not feasible or is inappropriate  Inpatient mental health services are necessary to meet patient needs and at least one of the following: Specific condition related to admission diagnosis is present and judged likely to deteriorate in absence of treatment at proposed level of care, Specific condition related to admission diagnosis is present and judged likely to further improve at proposed level of care      Patient coping skills attempted to reduce the crisis:  Unable to note    Disposition  Recommended disposition: Inpatient Mental Health        Reviewed case and recommendations with attending provider. Attending Name: Demetrius Howell MD       Attending concurs with disposition: yes (Admission vs observation status was extensively discussed with the ED provider.  Provider noted preference for admission for MH noting that reassessment could occur when/if appropriate in hopes that admission would note delay pt's care at this time.)       Patient and/or validated legal guardian concurs with disposition:   yes       Final disposition:  inpatient mental health    Legal status on admission: Voluntary/Patient has signed consent for treatment    Assessment Details   Total duration spent with the patient: 34 min     CPT code(s) utilized: 18077 - Psychotherapy for Crisis - 60 (30-74*) min    SHANAE Phoenix, Psychotherapist  [facility] - Triage & Transition Services  Callback: [phone]

## 2024-03-21 NOTE — TELEPHONE ENCOUNTER
R: 3:38pm- Per Samara Coombs, Extended Care, [facility] discontinued.  Pt does not consent to IP MH so remove from  inpt worklist.      Pt removed from  inpt worklist.

## 2024-03-21 NOTE — TELEPHONE ENCOUNTER
No appropriate beds are currently available within the  system. Bed search update ([facility]) @ 12:45AM:         [facility]: @ cap per website. Per staff @ 00:48, they are full tonight   [facility]: @ cap per website   [facility]: @ cap per website   [facility]: @ cap per website   [facility]: @ cap per website   [facility]: Posting 1 bed (Young Adult unit: No recent aggressions, violence or sexual assault. Neg covid required). Pt not age appropriate   [facility]: @ cap per website   [facility]: @ cap per website   [facility]: @ cap per website     Pt remains on work list until appropriate placement is available

## 2024-03-21 NOTE — ED PROVIDER NOTES
9:15 AM 3/21/2024     Received signout this morning from Dr. Brito.  Patient was on an SKYLER for SI.  This morning patient denies any suicidal ideations refusing to stay in the ER.  A 72-hour hold was placed.  I discussed with the patient need for reassessment by DEC and psychiatry today.  He verbalized understanding.  He states that he will not go to the patient psych voluntarily.  Will continue 72-hour hold.  He states that he takes his Adderall extended release 60 mg in the morning and Adderall 10 mg at night.  Discussed with pharmacy they performed med rec and to make changes accordingly.     Carina Gutierrez DO  03/21/24 0916

## 2024-03-21 NOTE — ED NOTES
Patient's After Visit Summary was reviewed with patient. Patient verbalized understanding of instructions, was recommended follow up and was given an opportunity to ask questions. Patient appears stable with independent ambulation and no signs of respiratory distress.

## 2024-03-21 NOTE — ED PROVIDER NOTES
"  History     Chief Complaint:  Mental Health Problem and Suicidal (Pt arrived via EMS for SI. Mentioned to dad if he had a gun hed use it, repeated same thoughts today- Not on meds for a few months d/t inbetween jobs. VSS. Transport hold only)     The history is provided by the patient.      Allan Leon is a 33 year old male presenting with mental health problem and SI. Patient was taken to the ED today after telling someone he wanted to kill himself. Patient has been experiencing an increased in SI and depression recently. Patient does not have insurance and takes his medications inconsistently. He has not taken any medication in the past month. Patient denies HI or history of suicide attempts. Patient claims he has no plan for suicide. Of note, Allan was hospitalized for mental health about 5 years ago.     Independent Historian:   None - Patient Only    Review of External Notes:   I reviewed law enforcement note which revealed patient told father he would have killed himself if he had a gun or had access to a gun.    Medications:    Adderall   Bupropion   Eletriptan   Montelukast  Sildenafil   Trazodone   Rizatriptan     Past Medical History:    Asthma   Appendicitis   Alcohol intoxication   Volar dislocation   ADHD  SI  Migraine     Past Surgical History:    Appendectomy  Cosmetic surgery   Pylorotomy EGD  Inguinal hernia repair      Physical Exam   Patient Vitals for the past 24 hrs:   BP Temp Temp src Pulse Resp SpO2 Height   03/20/24 2050 -- -- -- -- 18 100 % --   03/20/24 1840 107/70 97.8  F (36.6  C) Oral 77 -- -- 1.803 m (5' 11\")      Physical Exam    General:   Resting comfortably in the bed.  HEENT:    Oropharynx is moist  Eyes:    Conjunctiva normal  Neck:     Supple, no meningismus.     CV:     Regular rate and rhythm.      No murmurs, rubs or gallops.    PULM:    Clear to auscultation bilateral.       No respiratory distress.      Good air exchange.  ABD:    Soft, non-tender, non-distended.  " "     No rebound, guarding or rigidity.  MSK:     No gross deformity to all four extremities.   LYMPH:   No cervical lymphadenopathy.  NEURO:   Alert and oriented x 3.      Speech is clear with no aphasia.     Normal muscular tone, no tremor.  Skin:    Warm, dry and intact.    Psych:    Mood is depressed, affect is appropriate and congruent.     + suicidal ideation.     No delusions, hallucinations.     Memory intact.      Emergency Department Course     Imaging:  No orders to display      Laboratory:  Labs Ordered and Resulted from Time of ED Arrival to Time of ED Departure - No data to display     Emergency Department Course & Assessments:    Interventions:  Medications - No data to display   Independent Interpretation (X-rays, CTs, rhythm strip):  None    Assessments/Consultations/Discussion of Management or Tests:  ED Course as of 03/20/24 2224   Wed Mar 20, 2024   1913 I obtained the history and examined the patient as noted above.        Social Determinants of Health affecting care:   Healthcare Access/Compliance    Disposition:  Care of the patient was transferred to my colleague Dr. Brito.     Impression & Plan    CMS Diagnoses: None    MIPS (If applicable):  N/A    Medical Decision Makin-year-old male presents with suicidal ideation.  Patient reports intermittent suicidal thoughts but no active plan or intent.  Despite these reports, he did tell his father today \"if I had a gun or access to a gun, I would have already killed myself.\"  Patient appears to be minimizing his symptoms.  Patient placed on SKYLER hold.  DEC ordered and pending.  Patient changed for Dr. Brito to follow-up on DEC recommendations.    Diagnosis:    ICD-10-CM    1. Suicidal ideation  R45.851          Discharge Medications:  New Prescriptions    No medications on file      Scribe Disclosure:  I, Debbie Arias, am serving as a scribe at 7:22 PM on 3/20/2024 to document services personally performed by Demetrius Howell, " MD based on my observations and the provider's statements to me.  3/20/2024   Demetrius Howell MD Matthews, Jeremiah R, MD  03/20/24 2626

## 2024-03-21 NOTE — ED NOTES
IP MH Referral Acuity Rating Score (RARS)    LMHP complete at referral to IP MH, with DEC; and, daily while awaiting IP MH placement. Call score to PPS.  CRITERIA SCORING   New 74 HH and Involuntary for IP MH (not adolescent) 1/1   Boarding over 24 hours 1/1   Vulnerable adult at least 55+ with multiple co morbidities; or, Patient age 11 or under 0/1   Suicide ideation without relief of precipitating factors 1/1   Current plan for suicide 0/1   Current plan for homicide 0/1   Imminent risk or actual attempt to seriously harm another without relief of factors precipitating the attempt 0/1   Severe dysfunction in daily living (ex: complete neglect for self care, extreme disruption in vegetative function, extreme deterioration in social interactions) 1/1   Recent (last 2 weeks) or current physical aggression in the ED 1/1   Restraints or seclusion episode in ED 0/1   Verbal aggression, agitation, yelling, etc., while in the ED 1/1   Active psychosis with psychomotor agitation or catatonia 0/1   Need for constant or near constant redirection (from leaving, from others, etc).  0/1   Intrusive or disruptive behaviors 0/1   TOTAL Acuity Total Score: 6

## 2024-07-08 ENCOUNTER — OFFICE VISIT (OUTPATIENT)
Dept: FAMILY MEDICINE | Facility: CLINIC | Age: 34
End: 2024-07-08

## 2024-07-08 VITALS
HEART RATE: 85 BPM | TEMPERATURE: 98 F | OXYGEN SATURATION: 97 % | SYSTOLIC BLOOD PRESSURE: 112 MMHG | DIASTOLIC BLOOD PRESSURE: 72 MMHG

## 2024-07-08 DIAGNOSIS — J02.9 SORE THROAT: Primary | ICD-10-CM

## 2024-07-08 DIAGNOSIS — R09.89 THROAT CLEARING: ICD-10-CM

## 2024-07-08 DIAGNOSIS — R22.1 NECK MASS: ICD-10-CM

## 2024-07-08 LAB
% GRANULOCYTES: 43.7 %
COVID-19: NEGATIVE
HCT VFR BLD AUTO: 47.6 % (ref 40–53)
HEMOGLOBIN: 16 G/DL (ref 13.3–17.7)
LYMPHOCYTES NFR BLD AUTO: 44.5 %
MCH RBC QN AUTO: 32.1 PG (ref 26–33)
MCHC RBC AUTO-ENTMCNC: 33.6 G/DL (ref 31–36)
MCV RBC AUTO: 95.4 FL (ref 78–100)
MONOCYTES NFR BLD AUTO: 11.8 %
PLATELET COUNT - QUEST: 233 10^9/L (ref 150–375)
RBC # BLD AUTO: 4.99 10*12/L (ref 4.4–5.9)
STREP A: NEGATIVE
WBC # BLD AUTO: 8.8 10*9/L (ref 4–11)

## 2024-07-08 PROCEDURE — 85025 COMPLETE CBC W/AUTO DIFF WBC: CPT | Performed by: FAMILY MEDICINE

## 2024-07-08 PROCEDURE — 36415 COLL VENOUS BLD VENIPUNCTURE: CPT | Performed by: FAMILY MEDICINE

## 2024-07-08 PROCEDURE — 87635 SARS-COV-2 COVID-19 AMP PRB: CPT | Performed by: FAMILY MEDICINE

## 2024-07-08 PROCEDURE — 87651 STREP A DNA AMP PROBE: CPT | Performed by: FAMILY MEDICINE

## 2024-07-08 PROCEDURE — 99214 OFFICE O/P EST MOD 30 MIN: CPT | Performed by: FAMILY MEDICINE

## 2024-07-08 NOTE — NURSING NOTE
Chief Complaint   Patient presents with    Throat Problem     Constant throat clearing for the last year, he has seen multiple ENT and they advised this is post nasal drainage, this last week he has developed sharp pains in his throat, neck feels stiff and swollen, he can feel a small lump in his neck      Pre-visit Screening:  Immunizations:  up to date  Colonoscopy:  NA  Mammogram: NA  Asthma Action Test/Plan:  NA  PHQ9:  NA  GAD7:  NA  Questioned patient about current smoking habits Pt. quit smoking some time ago.  Ok to leave detailed message on voice mail for today's visit only Yes, phone # [phone]

## 2024-07-08 NOTE — PROGRESS NOTES
Assessment & Plan   Problem List Items Addressed This Visit    None  Visit Diagnoses       Sore throat    -  Primary    Relevant Orders    Radiology Referral (Affiliate Use Only)    US Head Neck Soft Tissue    COVID-19 ([facility]) (Completed)    Strep A ([facility]) (Completed)    HEMOGRAM PLATELET DIFF ([facility]) (Completed)    VENOUS COLLECTION (Completed)    Adult ENT  Referral - To a [facility] Location (Use POS/Location)    Throat clearing        Relevant Orders    Adult ENT  Referral - To a [facility] Location (Use POS/Location)    Neck mass        Relevant Orders    Radiology Referral (Affiliate Use Only)    US Head Neck Soft Tissue    Adult ENT  Referral - To a [facility] Location (Use POS/Location)           1. Sore throat  This is relatively recent. Dad says he is concerned about throat cancer. Negative testing, most likely viral uri cause. Continue to treat symptoms, but also due to the concern and the longer time period of the throat clearing, I advised followup with ENT.  - Radiology Referral (Affiliate Use Only)  - US Head Neck Soft Tissue  - COVID-19 ([facility])  - Strep A ([facility])  - HEMOGRAM PLATELET DIFF ([facility])  - VENOUS COLLECTION  - Adult ENT  Referral - To a [facility] Location (Use POS/Location)    2. Throat clearing  Followup with ENT.  - Adult ENT  Referral - To a [facility] Location (Use POS/Location)    3. Neck mass  Tiny mass, seems superficial, I will do an ultrasound to look closer at this lesion.  - Radiology Referral (Affiliate Use Only)  - US Head Neck Soft Tissue  - Adult ENT  Referral - To a [facility] Location (Use POS/Location)              FUTURE APPOINTMENTS:       - Follow-up visit with ENT.    No follow-ups on file.    Polina Lau MD  [facility]    Subjective     Nursing Notes:   Catherine Castle CMA  7/8/2024 10:42 AM  Signed  Chief Complaint   Patient presents  with    Throat Problem     Constant throat clearing for the last year, he has seen multiple ENT and they advised this is post nasal drainage, this last week he has developed sharp pains in his throat, neck feels stiff and swollen, he can feel a small lump in his neck      Pre-visit Screening:  Immunizations:  up to date  Colonoscopy:  NA  Mammogram: NA  Asthma Action Test/Plan:  NA  PHQ9:  NA  GAD7:  NA  Questioned patient about current smoking habits Pt. quit smoking some time ago.  Ok to leave detailed message on voice mail for today's visit only Yes, phone # [phone]       Allan Leon is a 34 year old male who presents to clinic today for the following health issues   HPI     Here with dad. Constant throat clearing. Has seen ENT in the past and has been told he has postnasal drainage.  He has noticed a lump on on the anterior neck area.     Constant  throat clearing for over a year, had camera down the throat, had a ct scan of the neck. Had a reflux test.     Now over the past week shart pain on the right side of the throat and it feels swollen. The pain is on the inside, but now when he swallows.     Review of Systems   Constitutional, HEENT, cardiovascular, pulmonary, gi and gu systems are negative, except as otherwise noted.      Objective    /72 (BP Location: Right arm, Patient Position: Sitting, Cuff Size: Adult Large)   Pulse 85   Temp 98  F (36.7  C) (Temporal)   SpO2 97%   There is no height or weight on file to calculate BMI.  Physical Exam   GENERAL: alert and no distress  HENT: nose and mouth without ulcers or lesions  MS: no gross musculoskeletal defects noted, no edema  NEURO: Normal strength and tone, mentation intact and speech normal  PSYCH: mentation appears normal, affect normal/bright  No cervical lymphadenopathy, small mass superficial in right lower anterior neck    Results for orders placed or performed in visit on 07/08/24   COVID-19 ([facility])     Status: None   Result  Value Ref Range    COVID-19 Negative    Strep A ([facility])     Status: None   Result Value Ref Range    STREP A Negative Negative   HEMOGRAM PLATELET DIFF ([facility])     Status: None   Result Value Ref Range    WBC 8.8 4.0 - 11 10*9/L    RBC Count 4.99 4.4 - 5.9 10*12/L    Hemoglobin 16.0 13.3 - 17.7 g/dL    Hematocrit 47.6 40.0 - 53.0 %    MCV 95.4 78 - 100 fL    MCH 32.1 26 - 33 pg    MCHC 33.6 31 - 36 g/dL    Platelet Count 233 150 - 375 10^9/L    % Granulocytes 43.7 %    % Lymphocytes 44.5 %    % Monocytes 11.8 %

## 2024-08-04 ENCOUNTER — HEALTH MAINTENANCE LETTER (OUTPATIENT)
Age: 34
End: 2024-08-04

## 2025-08-16 ENCOUNTER — HEALTH MAINTENANCE LETTER (OUTPATIENT)
Age: 35
End: 2025-08-16

## 2025-08-21 ENCOUNTER — TRANSCRIBE ORDERS (OUTPATIENT)
Dept: OTHER | Age: 35
End: 2025-08-21

## 2025-08-21 DIAGNOSIS — R13.12 DYSPHAGIA, OROPHARYNGEAL PHASE: Primary | ICD-10-CM
